# Patient Record
Sex: MALE | Race: BLACK OR AFRICAN AMERICAN | NOT HISPANIC OR LATINO | Employment: OTHER | URBAN - METROPOLITAN AREA
[De-identification: names, ages, dates, MRNs, and addresses within clinical notes are randomized per-mention and may not be internally consistent; named-entity substitution may affect disease eponyms.]

---

## 2021-12-08 ENCOUNTER — OFFICE VISIT (OUTPATIENT)
Dept: OPHTHALMOLOGY | Facility: CLINIC | Age: 51
End: 2021-12-08
Payer: MEDICARE

## 2021-12-08 DIAGNOSIS — H54.3 LOW VISION, BOTH EYES: ICD-10-CM

## 2021-12-08 DIAGNOSIS — E11.3553 STABLE PROLIFERATIVE DIABETIC RETINOPATHY OF BOTH EYES ASSOCIATED WITH TYPE 2 DIABETES MELLITUS: ICD-10-CM

## 2021-12-08 DIAGNOSIS — H35.371 EPIRETINAL MEMBRANE (ERM) OF RIGHT EYE: ICD-10-CM

## 2021-12-08 DIAGNOSIS — Z86.73 HISTORY OF STROKE IN ADULTHOOD: Primary | ICD-10-CM

## 2021-12-08 DIAGNOSIS — M35.01 KERATOCONJUNCTIVITIS SICCA: ICD-10-CM

## 2021-12-08 DIAGNOSIS — Z96.1 PSEUDOPHAKIA OF BOTH EYES: ICD-10-CM

## 2021-12-08 PROBLEM — E87.5 HYPERKALEMIA: Status: ACTIVE | Noted: 2019-07-27

## 2021-12-08 PROBLEM — L97.419 DIABETIC ULCER OF RIGHT MIDFOOT ASSOCIATED WITH TYPE 2 DIABETES MELLITUS: Status: ACTIVE | Noted: 2019-07-27

## 2021-12-08 PROBLEM — D75.839 THROMBOCYTOSIS: Status: ACTIVE | Noted: 2019-07-31

## 2021-12-08 PROBLEM — E11.621 DIABETIC ULCER OF RIGHT MIDFOOT ASSOCIATED WITH TYPE 2 DIABETES MELLITUS: Status: ACTIVE | Noted: 2019-07-27

## 2021-12-08 PROBLEM — Z89.429 HISTORY OF AMPUTATION OF LESSER TOE: Status: ACTIVE | Noted: 2019-07-30

## 2021-12-08 PROBLEM — E11.9 TYPE 2 DIABETES MELLITUS: Status: ACTIVE | Noted: 2019-07-27

## 2021-12-08 PROBLEM — N18.30 CKD (CHRONIC KIDNEY DISEASE) STAGE 3, GFR 30-59 ML/MIN: Status: ACTIVE | Noted: 2019-07-27

## 2021-12-08 PROBLEM — I10 ESSENTIAL HYPERTENSION: Status: ACTIVE | Noted: 2019-07-27

## 2021-12-08 PROBLEM — D64.9 NORMOCYTIC ANEMIA: Status: ACTIVE | Noted: 2019-07-27

## 2021-12-08 PROCEDURE — 92004 PR EYE EXAM, NEW PATIENT,COMPREHESV: ICD-10-PCS | Mod: S$PBB,,, | Performed by: STUDENT IN AN ORGANIZED HEALTH CARE EDUCATION/TRAINING PROGRAM

## 2021-12-08 PROCEDURE — 92134 POSTERIOR SEGMENT OCT RETINA (OCULAR COHERENCE TOMOGRAPHY)-BOTH EYES: ICD-10-PCS | Mod: 26,S$PBB,, | Performed by: STUDENT IN AN ORGANIZED HEALTH CARE EDUCATION/TRAINING PROGRAM

## 2021-12-08 PROCEDURE — 92004 COMPRE OPH EXAM NEW PT 1/>: CPT | Mod: S$PBB,,, | Performed by: STUDENT IN AN ORGANIZED HEALTH CARE EDUCATION/TRAINING PROGRAM

## 2021-12-08 PROCEDURE — 99203 OFFICE O/P NEW LOW 30 MIN: CPT | Mod: PBBFAC | Performed by: STUDENT IN AN ORGANIZED HEALTH CARE EDUCATION/TRAINING PROGRAM

## 2021-12-08 PROCEDURE — 92134 CPTRZ OPH DX IMG PST SGM RTA: CPT | Mod: PBBFAC | Performed by: STUDENT IN AN ORGANIZED HEALTH CARE EDUCATION/TRAINING PROGRAM

## 2021-12-08 PROCEDURE — 99999 PR PBB SHADOW E&M-NEW PATIENT-LVL III: CPT | Mod: PBBFAC,,, | Performed by: STUDENT IN AN ORGANIZED HEALTH CARE EDUCATION/TRAINING PROGRAM

## 2021-12-08 PROCEDURE — 99999 PR PBB SHADOW E&M-NEW PATIENT-LVL III: ICD-10-PCS | Mod: PBBFAC,,, | Performed by: STUDENT IN AN ORGANIZED HEALTH CARE EDUCATION/TRAINING PROGRAM

## 2021-12-08 RX ORDER — DULAGLUTIDE 1.5 MG/.5ML
INJECTION, SOLUTION SUBCUTANEOUS
COMMUNITY
Start: 2021-11-01

## 2021-12-08 RX ORDER — INSULIN DEGLUDEC 200 U/ML
25 INJECTION, SOLUTION SUBCUTANEOUS DAILY
COMMUNITY
Start: 2021-11-03

## 2021-12-08 RX ORDER — GABAPENTIN 300 MG/1
300 CAPSULE ORAL 3 TIMES DAILY
COMMUNITY
Start: 2021-08-25

## 2021-12-08 RX ORDER — TAMSULOSIN HYDROCHLORIDE 0.4 MG/1
1 CAPSULE ORAL DAILY
COMMUNITY
Start: 2021-11-22

## 2021-12-08 RX ORDER — CEPHALEXIN 500 MG/1
500 CAPSULE ORAL 4 TIMES DAILY
Status: ON HOLD | COMMUNITY
Start: 2021-10-07 | End: 2024-01-01

## 2021-12-08 RX ORDER — NALOXEGOL OXALATE 25 MG/1
TABLET, FILM COATED ORAL
COMMUNITY
Start: 2021-11-22

## 2021-12-08 RX ORDER — PROMETHAZINE HYDROCHLORIDE 12.5 MG/1
12.5 TABLET ORAL 3 TIMES DAILY PRN
COMMUNITY
Start: 2021-10-06

## 2021-12-08 RX ORDER — MORPHINE SULFATE 30 MG/1
TABLET ORAL
Status: ON HOLD | COMMUNITY
Start: 2021-11-30 | End: 2024-01-01 | Stop reason: CLARIF

## 2021-12-08 RX ORDER — DULOXETIN HYDROCHLORIDE 60 MG/1
60 CAPSULE, DELAYED RELEASE ORAL DAILY
Status: ON HOLD | COMMUNITY
Start: 2021-11-22 | End: 2024-01-01 | Stop reason: CLARIF

## 2021-12-08 RX ORDER — TRAZODONE HYDROCHLORIDE 150 MG/1
150-300 TABLET ORAL NIGHTLY PRN
COMMUNITY
Start: 2021-12-07

## 2021-12-08 RX ORDER — POTASSIUM CHLORIDE 750 MG/1
10 TABLET, EXTENDED RELEASE ORAL DAILY
COMMUNITY
Start: 2021-11-22

## 2021-12-08 RX ORDER — CLOPIDOGREL BISULFATE 75 MG/1
75 TABLET ORAL DAILY
COMMUNITY
Start: 2021-11-22

## 2021-12-08 RX ORDER — ASPIRIN 325 MG
50000 TABLET, DELAYED RELEASE (ENTERIC COATED) ORAL
COMMUNITY
Start: 2021-11-08

## 2021-12-08 RX ORDER — HYDRALAZINE HYDROCHLORIDE 50 MG/1
100 TABLET, FILM COATED ORAL 3 TIMES DAILY
COMMUNITY
Start: 2021-12-07

## 2021-12-08 RX ORDER — ATORVASTATIN CALCIUM 40 MG/1
40 TABLET, FILM COATED ORAL NIGHTLY
COMMUNITY
Start: 2021-11-22

## 2021-12-08 RX ORDER — GLIPIZIDE 5 MG/1
5 TABLET ORAL
COMMUNITY

## 2021-12-08 RX ORDER — CARVEDILOL 25 MG/1
25 TABLET ORAL 2 TIMES DAILY
COMMUNITY
Start: 2021-11-22

## 2021-12-08 RX ORDER — AMLODIPINE BESYLATE 10 MG/1
10 TABLET ORAL DAILY
COMMUNITY
Start: 2021-11-30

## 2021-12-08 RX ORDER — FOLIC ACID 1 MG/1
1000 TABLET ORAL DAILY
COMMUNITY
Start: 2021-10-25

## 2021-12-08 RX ORDER — FUROSEMIDE 80 MG/1
80 TABLET ORAL 2 TIMES DAILY
COMMUNITY
Start: 2021-11-22

## 2021-12-08 RX ORDER — PEN NEEDLE, DIABETIC 29 G X1/2"
NEEDLE, DISPOSABLE MISCELLANEOUS
COMMUNITY
Start: 2021-11-08

## 2022-02-15 ENCOUNTER — TELEPHONE (OUTPATIENT)
Dept: OPHTHALMOLOGY | Facility: CLINIC | Age: 52
End: 2022-02-15
Payer: MEDICARE

## 2022-02-15 NOTE — TELEPHONE ENCOUNTER
----- Message from Ernesto Boyd sent at 2/15/2022 11:30 AM CST -----  Contact: self  Moody Silverio would like a call back at [phone], in regards to why his appointment was scheduled. He states that he was told nothing can be done for his eye sight.

## 2024-01-01 ENCOUNTER — HOSPITAL ENCOUNTER (INPATIENT)
Facility: HOSPITAL | Age: 54
LOS: 10 days | DRG: 291 | End: 2024-01-20
Attending: HOSPITALIST | Admitting: HOSPITALIST
Payer: COMMERCIAL

## 2024-01-01 VITALS
BODY MASS INDEX: 34.83 KG/M2 | OXYGEN SATURATION: 94 % | HEART RATE: 93 BPM | HEIGHT: 73 IN | RESPIRATION RATE: 28 BRPM | WEIGHT: 262.81 LBS | TEMPERATURE: 102 F | DIASTOLIC BLOOD PRESSURE: 84 MMHG | SYSTOLIC BLOOD PRESSURE: 190 MMHG

## 2024-01-01 DIAGNOSIS — N18.9 ANEMIA IN CHRONIC KIDNEY DISEASE, UNSPECIFIED CKD STAGE: Primary | ICD-10-CM

## 2024-01-01 DIAGNOSIS — N39.0 URINARY TRACT INFECTION WITHOUT HEMATURIA, SITE UNSPECIFIED: ICD-10-CM

## 2024-01-01 DIAGNOSIS — N17.9 ACUTE KIDNEY INJURY SUPERIMPOSED ON CKD: ICD-10-CM

## 2024-01-01 DIAGNOSIS — N18.6 ESRD (END STAGE RENAL DISEASE): ICD-10-CM

## 2024-01-01 DIAGNOSIS — N17.9 ACUTE ON CHRONIC RENAL FAILURE: ICD-10-CM

## 2024-01-01 DIAGNOSIS — D63.1 ANEMIA IN CHRONIC KIDNEY DISEASE, UNSPECIFIED CKD STAGE: Primary | ICD-10-CM

## 2024-01-01 DIAGNOSIS — I50.31 ACUTE DIASTOLIC CONGESTIVE HEART FAILURE: ICD-10-CM

## 2024-01-01 DIAGNOSIS — J81.1 PULMONARY EDEMA: ICD-10-CM

## 2024-01-01 DIAGNOSIS — N18.9 ACUTE KIDNEY INJURY SUPERIMPOSED ON CKD: ICD-10-CM

## 2024-01-01 DIAGNOSIS — N18.9 ACUTE ON CHRONIC RENAL FAILURE: ICD-10-CM

## 2024-01-01 DIAGNOSIS — R07.9 CHEST PAIN: ICD-10-CM

## 2024-01-01 LAB
ABO + RH BLD: NORMAL
ABO + RH BLD: NORMAL
ADENOVIRUS: NOT DETECTED
ALBUMIN SERPL BCP-MCNC: 1.7 G/DL (ref 3.5–5.2)
ALBUMIN SERPL BCP-MCNC: 1.8 G/DL (ref 3.5–5.2)
ALBUMIN SERPL BCP-MCNC: 1.8 G/DL (ref 3.5–5.2)
ALBUMIN SERPL BCP-MCNC: 1.9 G/DL (ref 3.5–5.2)
ALBUMIN SERPL BCP-MCNC: 2.1 G/DL (ref 3.5–5.2)
ALBUMIN SERPL BCP-MCNC: 2.1 G/DL (ref 3.5–5.2)
ALBUMIN SERPL BCP-MCNC: 2.3 G/DL (ref 3.5–5.2)
ALBUMIN SERPL BCP-MCNC: 2.5 G/DL (ref 3.5–5.2)
ALLENS TEST: ABNORMAL
ALLENS TEST: ABNORMAL
ALP SERPL-CCNC: 66 U/L (ref 55–135)
ALT SERPL W/O P-5'-P-CCNC: 14 U/L (ref 10–44)
ANION GAP SERPL CALC-SCNC: 10 MMOL/L (ref 8–16)
ANION GAP SERPL CALC-SCNC: 10 MMOL/L (ref 8–16)
ANION GAP SERPL CALC-SCNC: 14 MMOL/L (ref 8–16)
ANION GAP SERPL CALC-SCNC: 14 MMOL/L (ref 8–16)
ANION GAP SERPL CALC-SCNC: 16 MMOL/L (ref 8–16)
ANION GAP SERPL CALC-SCNC: 17 MMOL/L (ref 8–16)
ANION GAP SERPL CALC-SCNC: 17 MMOL/L (ref 8–16)
ANION GAP SERPL CALC-SCNC: 21 MMOL/L (ref 8–16)
AORTIC ROOT ANNULUS: 3.69 CM
ASCENDING AORTA: 3.15 CM
AST SERPL-CCNC: 13 U/L (ref 10–40)
AV INDEX (PROSTH): 0.78
AV MEAN GRADIENT: 3 MMHG
AV PEAK GRADIENT: 4 MMHG
AV VALVE AREA BY VELOCITY RATIO: 3.77 CM²
AV VALVE AREA: 3.43 CM²
AV VELOCITY RATIO: 0.86
BACTERIA #/AREA URNS HPF: ABNORMAL /HPF
BACTERIA BLD CULT: NORMAL
BACTERIA SPEC AEROBE CULT: NORMAL
BACTERIA UR CULT: NO GROWTH
BASOPHILS # BLD AUTO: 0.01 K/UL (ref 0–0.2)
BASOPHILS # BLD AUTO: 0.02 K/UL (ref 0–0.2)
BASOPHILS # BLD AUTO: 0.03 K/UL (ref 0–0.2)
BASOPHILS # BLD AUTO: 0.04 K/UL (ref 0–0.2)
BASOPHILS # BLD AUTO: 0.04 K/UL (ref 0–0.2)
BASOPHILS NFR BLD: 0.1 % (ref 0–1.9)
BASOPHILS NFR BLD: 0.2 % (ref 0–1.9)
BASOPHILS NFR BLD: 0.3 % (ref 0–1.9)
BASOPHILS NFR BLD: 0.3 % (ref 0–1.9)
BILIRUB SERPL-MCNC: 0.5 MG/DL (ref 0.1–1)
BILIRUB UR QL STRIP: NEGATIVE
BLD GP AB SCN CELLS X3 SERPL QL: NORMAL
BLD GP AB SCN CELLS X3 SERPL QL: NORMAL
BLD PROD TYP BPU: NORMAL
BLD PROD TYP BPU: NORMAL
BLOOD UNIT EXPIRATION DATE: NORMAL
BLOOD UNIT EXPIRATION DATE: NORMAL
BLOOD UNIT TYPE CODE: 7300
BLOOD UNIT TYPE CODE: 7300
BLOOD UNIT TYPE: NORMAL
BLOOD UNIT TYPE: NORMAL
BNP SERPL-MCNC: 166 PG/ML (ref 0–99)
BNP SERPL-MCNC: 240 PG/ML (ref 0–99)
BORDETELLA PARAPERTUSSIS (IS1001): NOT DETECTED
BORDETELLA PERTUSSIS (PTXP): NOT DETECTED
BSA FOR ECHO PROCEDURE: 2.55 M2
BUN SERPL-MCNC: 107 MG/DL (ref 6–20)
BUN SERPL-MCNC: 110 MG/DL (ref 6–20)
BUN SERPL-MCNC: 114 MG/DL (ref 6–20)
BUN SERPL-MCNC: 131 MG/DL (ref 6–20)
BUN SERPL-MCNC: 153 MG/DL (ref 6–20)
BUN SERPL-MCNC: 80 MG/DL (ref 6–20)
BUN SERPL-MCNC: 89 MG/DL (ref 6–20)
BUN SERPL-MCNC: 92 MG/DL (ref 6–20)
BUN SERPL-MCNC: 96 MG/DL (ref 6–20)
BUN SERPL-MCNC: 97 MG/DL (ref 6–20)
BUN SERPL-MCNC: 97 MG/DL (ref 6–20)
CALCIUM SERPL-MCNC: 6.7 MG/DL (ref 8.7–10.5)
CALCIUM SERPL-MCNC: 6.9 MG/DL (ref 8.7–10.5)
CALCIUM SERPL-MCNC: 7.1 MG/DL (ref 8.7–10.5)
CALCIUM SERPL-MCNC: 7.1 MG/DL (ref 8.7–10.5)
CALCIUM SERPL-MCNC: 7.2 MG/DL (ref 8.7–10.5)
CALCIUM SERPL-MCNC: 7.3 MG/DL (ref 8.7–10.5)
CALCIUM SERPL-MCNC: 7.6 MG/DL (ref 8.7–10.5)
CALCIUM SERPL-MCNC: 7.6 MG/DL (ref 8.7–10.5)
CALCIUM SERPL-MCNC: 7.7 MG/DL (ref 8.7–10.5)
CALCIUM SERPL-MCNC: 7.8 MG/DL (ref 8.7–10.5)
CALCIUM SERPL-MCNC: 8 MG/DL (ref 8.7–10.5)
CHLAMYDIA PNEUMONIAE: NOT DETECTED
CHLORIDE SERPL-SCNC: 100 MMOL/L (ref 95–110)
CHLORIDE SERPL-SCNC: 104 MMOL/L (ref 95–110)
CHLORIDE SERPL-SCNC: 105 MMOL/L (ref 95–110)
CHLORIDE SERPL-SCNC: 106 MMOL/L (ref 95–110)
CHLORIDE SERPL-SCNC: 95 MMOL/L (ref 95–110)
CHLORIDE SERPL-SCNC: 96 MMOL/L (ref 95–110)
CHLORIDE SERPL-SCNC: 98 MMOL/L (ref 95–110)
CLARITY UR: ABNORMAL
CO2 SERPL-SCNC: 14 MMOL/L (ref 23–29)
CO2 SERPL-SCNC: 14 MMOL/L (ref 23–29)
CO2 SERPL-SCNC: 15 MMOL/L (ref 23–29)
CO2 SERPL-SCNC: 15 MMOL/L (ref 23–29)
CO2 SERPL-SCNC: 16 MMOL/L (ref 23–29)
CO2 SERPL-SCNC: 17 MMOL/L (ref 23–29)
CO2 SERPL-SCNC: 17 MMOL/L (ref 23–29)
CO2 SERPL-SCNC: 18 MMOL/L (ref 23–29)
CO2 SERPL-SCNC: 21 MMOL/L (ref 23–29)
CO2 SERPL-SCNC: 22 MMOL/L (ref 23–29)
CO2 SERPL-SCNC: 28 MMOL/L (ref 23–29)
CODING SYSTEM: NORMAL
CODING SYSTEM: NORMAL
COLOR UR: YELLOW
CORONAVIRUS 229E, COMMON COLD VIRUS: NOT DETECTED
CORONAVIRUS HKU1, COMMON COLD VIRUS: NOT DETECTED
CORONAVIRUS NL63, COMMON COLD VIRUS: NOT DETECTED
CORONAVIRUS OC43, COMMON COLD VIRUS: NOT DETECTED
CREAT SERPL-MCNC: 10.6 MG/DL (ref 0.5–1.4)
CREAT SERPL-MCNC: 4.7 MG/DL (ref 0.5–1.4)
CREAT SERPL-MCNC: 4.8 MG/DL (ref 0.5–1.4)
CREAT SERPL-MCNC: 5.6 MG/DL (ref 0.5–1.4)
CREAT SERPL-MCNC: 6 MG/DL (ref 0.5–1.4)
CREAT SERPL-MCNC: 6.7 MG/DL (ref 0.5–1.4)
CREAT SERPL-MCNC: 8 MG/DL (ref 0.5–1.4)
CREAT SERPL-MCNC: 8.6 MG/DL (ref 0.5–1.4)
CROSSMATCH INTERPRETATION: NORMAL
CROSSMATCH INTERPRETATION: NORMAL
CV ECHO LV RWT: 0.44 CM
DELSYS: ABNORMAL
DELSYS: ABNORMAL
DIFFERENTIAL METHOD BLD: ABNORMAL
DISPENSE STATUS: NORMAL
DISPENSE STATUS: NORMAL
DOP CALC AO PEAK VEL: 1.02 M/S
DOP CALC AO VTI: 25.4 CM
DOP CALC LVOT AREA: 4.4 CM2
DOP CALC LVOT DIAMETER: 2.36 CM
DOP CALC LVOT PEAK VEL: 0.88 M/S
DOP CALC LVOT STROKE VOLUME: 87.01 CM3
DOP CALC RVOT PEAK VEL: 0.73 M/S
DOP CALC RVOT VTI: 15.5 CM
DOP CALCLVOT PEAK VEL VTI: 19.9 CM
E WAVE DECELERATION TIME: 175.34 MSEC
E/A RATIO: 1.26
E/E' RATIO: 13 M/S
ECHO LV POSTERIOR WALL: 1.09 CM (ref 0.6–1.1)
EOSINOPHIL # BLD AUTO: 0 K/UL (ref 0–0.5)
EOSINOPHIL # BLD AUTO: 0 K/UL (ref 0–0.5)
EOSINOPHIL # BLD AUTO: 0.1 K/UL (ref 0–0.5)
EOSINOPHIL # BLD AUTO: 0.1 K/UL (ref 0–0.5)
EOSINOPHIL # BLD AUTO: 0.2 K/UL (ref 0–0.5)
EOSINOPHIL # BLD AUTO: 0.3 K/UL (ref 0–0.5)
EOSINOPHIL # BLD AUTO: 0.4 K/UL (ref 0–0.5)
EOSINOPHIL NFR BLD: 0 % (ref 0–8)
EOSINOPHIL NFR BLD: 0 % (ref 0–8)
EOSINOPHIL NFR BLD: 0.4 % (ref 0–8)
EOSINOPHIL NFR BLD: 0.5 % (ref 0–8)
EOSINOPHIL NFR BLD: 1.4 % (ref 0–8)
EOSINOPHIL NFR BLD: 1.7 % (ref 0–8)
EOSINOPHIL NFR BLD: 1.8 % (ref 0–8)
EOSINOPHIL NFR BLD: 1.9 % (ref 0–8)
EOSINOPHIL NFR BLD: 2 % (ref 0–8)
EOSINOPHIL NFR BLD: 2.6 % (ref 0–8)
EOSINOPHIL NFR BLD: 3.1 % (ref 0–8)
EP: 6
ERYTHROCYTE [DISTWIDTH] IN BLOOD BY AUTOMATED COUNT: 13.8 % (ref 11.5–14.5)
ERYTHROCYTE [DISTWIDTH] IN BLOOD BY AUTOMATED COUNT: 13.9 % (ref 11.5–14.5)
ERYTHROCYTE [DISTWIDTH] IN BLOOD BY AUTOMATED COUNT: 14 % (ref 11.5–14.5)
ERYTHROCYTE [DISTWIDTH] IN BLOOD BY AUTOMATED COUNT: 14.1 % (ref 11.5–14.5)
ERYTHROCYTE [DISTWIDTH] IN BLOOD BY AUTOMATED COUNT: 14.1 % (ref 11.5–14.5)
ERYTHROCYTE [DISTWIDTH] IN BLOOD BY AUTOMATED COUNT: 14.2 % (ref 11.5–14.5)
ERYTHROCYTE [DISTWIDTH] IN BLOOD BY AUTOMATED COUNT: 14.2 % (ref 11.5–14.5)
ERYTHROCYTE [DISTWIDTH] IN BLOOD BY AUTOMATED COUNT: 14.4 % (ref 11.5–14.5)
ERYTHROCYTE [DISTWIDTH] IN BLOOD BY AUTOMATED COUNT: 14.6 % (ref 11.5–14.5)
ERYTHROCYTE [SEDIMENTATION RATE] IN BLOOD BY WESTERGREN METHOD: 18 MM/H
EST. GFR  (NO RACE VARIABLE): 10 ML/MIN/1.73 M^2
EST. GFR  (NO RACE VARIABLE): 11 ML/MIN/1.73 M^2
EST. GFR  (NO RACE VARIABLE): 14 ML/MIN/1.73 M^2
EST. GFR  (NO RACE VARIABLE): 5 ML/MIN/1.73 M^2
EST. GFR  (NO RACE VARIABLE): 7 ML/MIN/1.73 M^2
EST. GFR  (NO RACE VARIABLE): 7 ML/MIN/1.73 M^2
EST. GFR  (NO RACE VARIABLE): 9 ML/MIN/1.73 M^2
ESTIMATED AVG GLUCOSE: 131 MG/DL (ref 68–131)
FERRITIN SERPL-MCNC: 256 NG/ML (ref 20–300)
FIO2: 100
FIO2: 60
FLOW: 30
FLUBV RNA NPH QL NAA+NON-PROBE: NOT DETECTED
FRACTIONAL SHORTENING: 33 % (ref 28–44)
GLUCOSE SERPL-MCNC: 131 MG/DL (ref 70–110)
GLUCOSE SERPL-MCNC: 141 MG/DL (ref 70–110)
GLUCOSE SERPL-MCNC: 155 MG/DL (ref 70–110)
GLUCOSE SERPL-MCNC: 158 MG/DL (ref 70–110)
GLUCOSE SERPL-MCNC: 159 MG/DL (ref 70–110)
GLUCOSE SERPL-MCNC: 162 MG/DL (ref 70–110)
GLUCOSE SERPL-MCNC: 163 MG/DL (ref 70–110)
GLUCOSE SERPL-MCNC: 189 MG/DL (ref 70–110)
GLUCOSE SERPL-MCNC: 213 MG/DL (ref 70–110)
GLUCOSE SERPL-MCNC: 222 MG/DL (ref 70–110)
GLUCOSE SERPL-MCNC: 389 MG/DL (ref 70–110)
GLUCOSE UR QL STRIP: ABNORMAL
GRAM STN SPEC: NORMAL
GRAM STN SPEC: NORMAL
HAV IGM SERPL QL IA: NORMAL
HBA1C MFR BLD: 6.2 % (ref 4–5.6)
HBV CORE AB SERPL QL IA: NORMAL
HBV CORE IGM SERPL QL IA: NORMAL
HBV SURFACE AB SER-ACNC: 5.05 MIU/ML
HBV SURFACE AB SER-ACNC: NORMAL M[IU]/ML
HBV SURFACE AG SERPL QL IA: NORMAL
HBV SURFACE AG SERPL QL IA: NORMAL
HCO3 UR-SCNC: 20 MMOL/L (ref 24–28)
HCO3 UR-SCNC: 22.7 MMOL/L (ref 24–28)
HCT VFR BLD AUTO: 19.7 % (ref 40–54)
HCT VFR BLD AUTO: 21.5 % (ref 40–54)
HCT VFR BLD AUTO: 22 % (ref 40–54)
HCT VFR BLD AUTO: 23.3 % (ref 40–54)
HCT VFR BLD AUTO: 23.4 % (ref 40–54)
HCT VFR BLD AUTO: 23.7 % (ref 40–54)
HCT VFR BLD AUTO: 24.2 % (ref 40–54)
HCT VFR BLD AUTO: 24.5 % (ref 40–54)
HCT VFR BLD AUTO: 24.6 % (ref 40–54)
HCT VFR BLD AUTO: 25.4 % (ref 40–54)
HCT VFR BLD AUTO: 27.2 % (ref 40–54)
HCV AB SERPL QL IA: NORMAL
HGB BLD-MCNC: 6.5 G/DL (ref 14–18)
HGB BLD-MCNC: 7.2 G/DL (ref 14–18)
HGB BLD-MCNC: 7.3 G/DL (ref 14–18)
HGB BLD-MCNC: 7.4 G/DL (ref 14–18)
HGB BLD-MCNC: 7.5 G/DL (ref 14–18)
HGB BLD-MCNC: 7.6 G/DL (ref 14–18)
HGB BLD-MCNC: 7.7 G/DL (ref 14–18)
HGB BLD-MCNC: 8.1 G/DL (ref 14–18)
HGB BLD-MCNC: 8.2 G/DL (ref 14–18)
HGB BLD-MCNC: 8.2 G/DL (ref 14–18)
HGB BLD-MCNC: 8.8 G/DL (ref 14–18)
HGB UR QL STRIP: ABNORMAL
HPIV1 RNA NPH QL NAA+NON-PROBE: NOT DETECTED
HPIV2 RNA NPH QL NAA+NON-PROBE: NOT DETECTED
HPIV3 RNA NPH QL NAA+NON-PROBE: NOT DETECTED
HPIV4 RNA NPH QL NAA+NON-PROBE: NOT DETECTED
HUMAN METAPNEUMOVIRUS: NOT DETECTED
HYALINE CASTS #/AREA URNS LPF: 57 /LPF
IMM GRANULOCYTES # BLD AUTO: 0.05 K/UL (ref 0–0.04)
IMM GRANULOCYTES # BLD AUTO: 0.07 K/UL (ref 0–0.04)
IMM GRANULOCYTES # BLD AUTO: 0.09 K/UL (ref 0–0.04)
IMM GRANULOCYTES # BLD AUTO: 0.11 K/UL (ref 0–0.04)
IMM GRANULOCYTES # BLD AUTO: 0.15 K/UL (ref 0–0.04)
IMM GRANULOCYTES # BLD AUTO: 0.15 K/UL (ref 0–0.04)
IMM GRANULOCYTES # BLD AUTO: 0.16 K/UL (ref 0–0.04)
IMM GRANULOCYTES # BLD AUTO: 0.21 K/UL (ref 0–0.04)
IMM GRANULOCYTES # BLD AUTO: 0.21 K/UL (ref 0–0.04)
IMM GRANULOCYTES # BLD AUTO: 0.22 K/UL (ref 0–0.04)
IMM GRANULOCYTES # BLD AUTO: 0.29 K/UL (ref 0–0.04)
IMM GRANULOCYTES NFR BLD AUTO: 0.6 % (ref 0–0.5)
IMM GRANULOCYTES NFR BLD AUTO: 0.7 % (ref 0–0.5)
IMM GRANULOCYTES NFR BLD AUTO: 0.9 % (ref 0–0.5)
IMM GRANULOCYTES NFR BLD AUTO: 1 % (ref 0–0.5)
IMM GRANULOCYTES NFR BLD AUTO: 1.1 % (ref 0–0.5)
IMM GRANULOCYTES NFR BLD AUTO: 1.1 % (ref 0–0.5)
IMM GRANULOCYTES NFR BLD AUTO: 1.3 % (ref 0–0.5)
IMM GRANULOCYTES NFR BLD AUTO: 1.4 % (ref 0–0.5)
IMM GRANULOCYTES NFR BLD AUTO: 1.5 % (ref 0–0.5)
IMM GRANULOCYTES NFR BLD AUTO: 1.5 % (ref 0–0.5)
IMM GRANULOCYTES NFR BLD AUTO: 1.6 % (ref 0–0.5)
INFLUENZA A (SUBTYPES H1,H1-2009,H3): NOT DETECTED
INTERVENTRICULAR SEPTUM: 1.24 CM (ref 0.6–1.1)
IP: 12
IRON SERPL-MCNC: 13 UG/DL (ref 45–160)
IVRT: 91.34 MSEC
KETONES UR QL STRIP: NEGATIVE
LA MAJOR: 4.98 CM
LA MINOR: 5.04 CM
LA WIDTH: 3.9 CM
LACTATE SERPL-SCNC: 0.6 MMOL/L (ref 0.5–2.2)
LACTATE SERPL-SCNC: 0.6 MMOL/L (ref 0.5–2.2)
LEFT ATRIUM SIZE: 3.1 CM
LEFT ATRIUM VOLUME INDEX: 20.8 ML/M2
LEFT ATRIUM VOLUME: 51.48 CM3
LEFT INTERNAL DIMENSION IN SYSTOLE: 3.32 CM (ref 2.1–4)
LEFT VENTRICLE DIASTOLIC VOLUME INDEX: 47 ML/M2
LEFT VENTRICLE DIASTOLIC VOLUME: 116.57 ML
LEFT VENTRICLE MASS INDEX: 90 G/M2
LEFT VENTRICLE SYSTOLIC VOLUME INDEX: 18.1 ML/M2
LEFT VENTRICLE SYSTOLIC VOLUME: 44.85 ML
LEFT VENTRICULAR INTERNAL DIMENSION IN DIASTOLE: 4.97 CM (ref 3.5–6)
LEFT VENTRICULAR MASS: 222.12 G
LEUKOCYTE ESTERASE UR QL STRIP: ABNORMAL
LV LATERAL E/E' RATIO: 13 M/S
LV SEPTAL E/E' RATIO: 13 M/S
LVOT MG: 1.95 MMHG
LVOT MV: 0.67 CM/S
LYMPHOCYTES # BLD AUTO: 0.5 K/UL (ref 1–4.8)
LYMPHOCYTES # BLD AUTO: 0.7 K/UL (ref 1–4.8)
LYMPHOCYTES # BLD AUTO: 0.7 K/UL (ref 1–4.8)
LYMPHOCYTES # BLD AUTO: 0.8 K/UL (ref 1–4.8)
LYMPHOCYTES # BLD AUTO: 0.8 K/UL (ref 1–4.8)
LYMPHOCYTES # BLD AUTO: 0.9 K/UL (ref 1–4.8)
LYMPHOCYTES # BLD AUTO: 1 K/UL (ref 1–4.8)
LYMPHOCYTES NFR BLD: 10 % (ref 18–48)
LYMPHOCYTES NFR BLD: 11.7 % (ref 18–48)
LYMPHOCYTES NFR BLD: 2.5 % (ref 18–48)
LYMPHOCYTES NFR BLD: 4.7 % (ref 18–48)
LYMPHOCYTES NFR BLD: 5 % (ref 18–48)
LYMPHOCYTES NFR BLD: 5.8 % (ref 18–48)
LYMPHOCYTES NFR BLD: 7.4 % (ref 18–48)
LYMPHOCYTES NFR BLD: 7.5 % (ref 18–48)
LYMPHOCYTES NFR BLD: 7.9 % (ref 18–48)
LYMPHOCYTES NFR BLD: 8.1 % (ref 18–48)
LYMPHOCYTES NFR BLD: 8.1 % (ref 18–48)
MAGNESIUM SERPL-MCNC: 2.5 MG/DL (ref 1.6–2.6)
MCH RBC QN AUTO: 27.8 PG (ref 27–31)
MCH RBC QN AUTO: 28 PG (ref 27–31)
MCH RBC QN AUTO: 28.3 PG (ref 27–31)
MCH RBC QN AUTO: 28.4 PG (ref 27–31)
MCH RBC QN AUTO: 28.4 PG (ref 27–31)
MCH RBC QN AUTO: 28.8 PG (ref 27–31)
MCH RBC QN AUTO: 28.8 PG (ref 27–31)
MCH RBC QN AUTO: 28.9 PG (ref 27–31)
MCH RBC QN AUTO: 28.9 PG (ref 27–31)
MCH RBC QN AUTO: 29.2 PG (ref 27–31)
MCH RBC QN AUTO: 29.4 PG (ref 27–31)
MCHC RBC AUTO-ENTMCNC: 31.4 G/DL (ref 32–36)
MCHC RBC AUTO-ENTMCNC: 31.6 G/DL (ref 32–36)
MCHC RBC AUTO-ENTMCNC: 32.1 G/DL (ref 32–36)
MCHC RBC AUTO-ENTMCNC: 32.2 G/DL (ref 32–36)
MCHC RBC AUTO-ENTMCNC: 32.3 G/DL (ref 32–36)
MCHC RBC AUTO-ENTMCNC: 32.4 G/DL (ref 32–36)
MCHC RBC AUTO-ENTMCNC: 33 G/DL (ref 32–36)
MCHC RBC AUTO-ENTMCNC: 33.2 G/DL (ref 32–36)
MCHC RBC AUTO-ENTMCNC: 33.3 G/DL (ref 32–36)
MCHC RBC AUTO-ENTMCNC: 33.5 G/DL (ref 32–36)
MCHC RBC AUTO-ENTMCNC: 33.5 G/DL (ref 32–36)
MCV RBC AUTO: 86 FL (ref 82–98)
MCV RBC AUTO: 87 FL (ref 82–98)
MCV RBC AUTO: 87 FL (ref 82–98)
MCV RBC AUTO: 88 FL (ref 82–98)
MCV RBC AUTO: 89 FL (ref 82–98)
MCV RBC AUTO: 90 FL (ref 82–98)
MICROSCOPIC COMMENT: ABNORMAL
MODE: ABNORMAL
MODE: ABNORMAL
MONOCYTES # BLD AUTO: 0.7 K/UL (ref 0.3–1)
MONOCYTES # BLD AUTO: 0.8 K/UL (ref 0.3–1)
MONOCYTES # BLD AUTO: 0.9 K/UL (ref 0.3–1)
MONOCYTES # BLD AUTO: 1 K/UL (ref 0.3–1)
MONOCYTES # BLD AUTO: 1.1 K/UL (ref 0.3–1)
MONOCYTES # BLD AUTO: 1.1 K/UL (ref 0.3–1)
MONOCYTES NFR BLD: 4.7 % (ref 4–15)
MONOCYTES NFR BLD: 5.3 % (ref 4–15)
MONOCYTES NFR BLD: 6.9 % (ref 4–15)
MONOCYTES NFR BLD: 6.9 % (ref 4–15)
MONOCYTES NFR BLD: 7.2 % (ref 4–15)
MONOCYTES NFR BLD: 7.4 % (ref 4–15)
MONOCYTES NFR BLD: 7.6 % (ref 4–15)
MONOCYTES NFR BLD: 7.8 % (ref 4–15)
MONOCYTES NFR BLD: 8 % (ref 4–15)
MONOCYTES NFR BLD: 8.3 % (ref 4–15)
MONOCYTES NFR BLD: 9.5 % (ref 4–15)
MV PEAK A VEL: 0.93 M/S
MV PEAK E VEL: 1.17 M/S
MYCOPLASMA PNEUMONIAE: NOT DETECTED
NEUTROPHILS # BLD AUTO: 10.2 K/UL (ref 1.8–7.7)
NEUTROPHILS # BLD AUTO: 10.5 K/UL (ref 1.8–7.7)
NEUTROPHILS # BLD AUTO: 10.6 K/UL (ref 1.8–7.7)
NEUTROPHILS # BLD AUTO: 12 K/UL (ref 1.8–7.7)
NEUTROPHILS # BLD AUTO: 12.1 K/UL (ref 1.8–7.7)
NEUTROPHILS # BLD AUTO: 13.5 K/UL (ref 1.8–7.7)
NEUTROPHILS # BLD AUTO: 17.1 K/UL (ref 1.8–7.7)
NEUTROPHILS # BLD AUTO: 6.6 K/UL (ref 1.8–7.7)
NEUTROPHILS # BLD AUTO: 6.7 K/UL (ref 1.8–7.7)
NEUTROPHILS # BLD AUTO: 7.9 K/UL (ref 1.8–7.7)
NEUTROPHILS # BLD AUTO: 9.4 K/UL (ref 1.8–7.7)
NEUTROPHILS NFR BLD: 75.9 % (ref 38–73)
NEUTROPHILS NFR BLD: 80.8 % (ref 38–73)
NEUTROPHILS NFR BLD: 81.2 % (ref 38–73)
NEUTROPHILS NFR BLD: 81.2 % (ref 38–73)
NEUTROPHILS NFR BLD: 81.4 % (ref 38–73)
NEUTROPHILS NFR BLD: 82.1 % (ref 38–73)
NEUTROPHILS NFR BLD: 82.7 % (ref 38–73)
NEUTROPHILS NFR BLD: 82.9 % (ref 38–73)
NEUTROPHILS NFR BLD: 85.1 % (ref 38–73)
NEUTROPHILS NFR BLD: 87.8 % (ref 38–73)
NEUTROPHILS NFR BLD: 90.6 % (ref 38–73)
NITRITE UR QL STRIP: NEGATIVE
NRBC BLD-RTO: 0 /100 WBC
NUM UNITS TRANS PACKED RBC: NORMAL
NUM UNITS TRANS PACKED RBC: NORMAL
PCO2 BLDA: 33.6 MMHG (ref 35–45)
PCO2 BLDA: 41.5 MMHG (ref 35–45)
PH SMN: 7.35 [PH] (ref 7.35–7.45)
PH SMN: 7.38 [PH] (ref 7.35–7.45)
PH UR STRIP: 5 [PH] (ref 5–8)
PHOSPHATE SERPL-MCNC: 4.6 MG/DL (ref 2.7–4.5)
PHOSPHATE SERPL-MCNC: 4.7 MG/DL (ref 2.7–4.5)
PHOSPHATE SERPL-MCNC: 5 MG/DL (ref 2.7–4.5)
PHOSPHATE SERPL-MCNC: 5.4 MG/DL (ref 2.7–4.5)
PHOSPHATE SERPL-MCNC: 6 MG/DL (ref 2.7–4.5)
PHOSPHATE SERPL-MCNC: 6.1 MG/DL (ref 2.7–4.5)
PHOSPHATE SERPL-MCNC: 6.7 MG/DL (ref 2.7–4.5)
PHOSPHATE SERPL-MCNC: 6.8 MG/DL (ref 2.7–4.5)
PHOSPHATE SERPL-MCNC: 8.4 MG/DL (ref 2.7–4.5)
PISA TR MAX VEL: 2.76 M/S
PLATELET # BLD AUTO: 339 K/UL (ref 150–450)
PLATELET # BLD AUTO: 343 K/UL (ref 150–450)
PLATELET # BLD AUTO: 350 K/UL (ref 150–450)
PLATELET # BLD AUTO: 356 K/UL (ref 150–450)
PLATELET # BLD AUTO: 358 K/UL (ref 150–450)
PLATELET # BLD AUTO: 360 K/UL (ref 150–450)
PLATELET # BLD AUTO: 360 K/UL (ref 150–450)
PLATELET # BLD AUTO: 363 K/UL (ref 150–450)
PLATELET # BLD AUTO: 364 K/UL (ref 150–450)
PLATELET # BLD AUTO: 375 K/UL (ref 150–450)
PLATELET # BLD AUTO: 381 K/UL (ref 150–450)
PMV BLD AUTO: 10 FL (ref 9.2–12.9)
PMV BLD AUTO: 9.6 FL (ref 9.2–12.9)
PMV BLD AUTO: 9.7 FL (ref 9.2–12.9)
PMV BLD AUTO: 9.8 FL (ref 9.2–12.9)
PMV BLD AUTO: 9.8 FL (ref 9.2–12.9)
PMV BLD AUTO: 9.9 FL (ref 9.2–12.9)
PO2 BLDA: 154 MMHG (ref 80–100)
PO2 BLDA: 60 MMHG (ref 80–100)
POC BE: -3 MMOL/L
POC BE: -5 MMOL/L
POC SATURATED O2: 91 % (ref 95–100)
POC SATURATED O2: 99 % (ref 95–100)
POCT GLUCOSE: 111 MG/DL (ref 70–110)
POCT GLUCOSE: 135 MG/DL (ref 70–110)
POCT GLUCOSE: 140 MG/DL (ref 70–110)
POCT GLUCOSE: 141 MG/DL (ref 70–110)
POCT GLUCOSE: 144 MG/DL (ref 70–110)
POCT GLUCOSE: 146 MG/DL (ref 70–110)
POCT GLUCOSE: 156 MG/DL (ref 70–110)
POCT GLUCOSE: 157 MG/DL (ref 70–110)
POCT GLUCOSE: 161 MG/DL (ref 70–110)
POCT GLUCOSE: 169 MG/DL (ref 70–110)
POCT GLUCOSE: 170 MG/DL (ref 70–110)
POCT GLUCOSE: 172 MG/DL (ref 70–110)
POCT GLUCOSE: 175 MG/DL (ref 70–110)
POCT GLUCOSE: 175 MG/DL (ref 70–110)
POCT GLUCOSE: 176 MG/DL (ref 70–110)
POCT GLUCOSE: 177 MG/DL (ref 70–110)
POCT GLUCOSE: 178 MG/DL (ref 70–110)
POCT GLUCOSE: 181 MG/DL (ref 70–110)
POCT GLUCOSE: 183 MG/DL (ref 70–110)
POCT GLUCOSE: 184 MG/DL (ref 70–110)
POCT GLUCOSE: 188 MG/DL (ref 70–110)
POCT GLUCOSE: 198 MG/DL (ref 70–110)
POCT GLUCOSE: 199 MG/DL (ref 70–110)
POCT GLUCOSE: 202 MG/DL (ref 70–110)
POCT GLUCOSE: 205 MG/DL (ref 70–110)
POCT GLUCOSE: 210 MG/DL (ref 70–110)
POCT GLUCOSE: 211 MG/DL (ref 70–110)
POCT GLUCOSE: 212 MG/DL (ref 70–110)
POCT GLUCOSE: 213 MG/DL (ref 70–110)
POCT GLUCOSE: 213 MG/DL (ref 70–110)
POCT GLUCOSE: 230 MG/DL (ref 70–110)
POCT GLUCOSE: 255 MG/DL (ref 70–110)
POCT GLUCOSE: 274 MG/DL (ref 70–110)
POCT GLUCOSE: 371 MG/DL (ref 70–110)
POCT GLUCOSE: 405 MG/DL (ref 70–110)
POTASSIUM SERPL-SCNC: 3.6 MMOL/L (ref 3.5–5.1)
POTASSIUM SERPL-SCNC: 3.6 MMOL/L (ref 3.5–5.1)
POTASSIUM SERPL-SCNC: 3.7 MMOL/L (ref 3.5–5.1)
POTASSIUM SERPL-SCNC: 3.8 MMOL/L (ref 3.5–5.1)
POTASSIUM SERPL-SCNC: 3.9 MMOL/L (ref 3.5–5.1)
POTASSIUM SERPL-SCNC: 4.1 MMOL/L (ref 3.5–5.1)
POTASSIUM SERPL-SCNC: 4.1 MMOL/L (ref 3.5–5.1)
POTASSIUM SERPL-SCNC: 4.2 MMOL/L (ref 3.5–5.1)
POTASSIUM SERPL-SCNC: 4.5 MMOL/L (ref 3.5–5.1)
PROCALCITONIN SERPL IA-MCNC: 0.87 NG/ML
PROCALCITONIN SERPL IA-MCNC: 1.44 NG/ML
PROT SERPL-MCNC: 7.5 G/DL (ref 6–8.4)
PROT UR QL STRIP: ABNORMAL
PV MEAN GRADIENT: 1 MMHG
PV PEAK GRADIENT: 3 MMHG
PV PEAK VELOCITY: 0.89 M/S
RA PRESSURE ESTIMATED: 3 MMHG
RBC # BLD AUTO: 2.26 M/UL (ref 4.6–6.2)
RBC # BLD AUTO: 2.45 M/UL (ref 4.6–6.2)
RBC # BLD AUTO: 2.53 M/UL (ref 4.6–6.2)
RBC # BLD AUTO: 2.64 M/UL (ref 4.6–6.2)
RBC # BLD AUTO: 2.66 M/UL (ref 4.6–6.2)
RBC # BLD AUTO: 2.68 M/UL (ref 4.6–6.2)
RBC # BLD AUTO: 2.75 M/UL (ref 4.6–6.2)
RBC # BLD AUTO: 2.81 M/UL (ref 4.6–6.2)
RBC # BLD AUTO: 2.81 M/UL (ref 4.6–6.2)
RBC # BLD AUTO: 2.9 M/UL (ref 4.6–6.2)
RBC # BLD AUTO: 3.04 M/UL (ref 4.6–6.2)
RBC #/AREA URNS HPF: 36 /HPF (ref 0–4)
RESPIRATORY INFECTION PANEL SOURCE: NORMAL
RSV RNA NPH QL NAA+NON-PROBE: NOT DETECTED
RV TB RVSP: 6 MMHG
RV+EV RNA NPH QL NAA+NON-PROBE: NOT DETECTED
SAMPLE: ABNORMAL
SAMPLE: ABNORMAL
SARS-COV-2 RNA RESP QL NAA+PROBE: NOT DETECTED
SATURATED IRON: 7 % (ref 20–50)
SITE: ABNORMAL
SITE: ABNORMAL
SODIUM SERPL-SCNC: 129 MMOL/L (ref 136–145)
SODIUM SERPL-SCNC: 131 MMOL/L (ref 136–145)
SODIUM SERPL-SCNC: 132 MMOL/L (ref 136–145)
SODIUM SERPL-SCNC: 132 MMOL/L (ref 136–145)
SODIUM SERPL-SCNC: 133 MMOL/L (ref 136–145)
SODIUM SERPL-SCNC: 134 MMOL/L (ref 136–145)
SODIUM SERPL-SCNC: 134 MMOL/L (ref 136–145)
SODIUM SERPL-SCNC: 135 MMOL/L (ref 136–145)
SODIUM SERPL-SCNC: 135 MMOL/L (ref 136–145)
SODIUM SERPL-SCNC: 136 MMOL/L (ref 136–145)
SODIUM SERPL-SCNC: 137 MMOL/L (ref 136–145)
SP GR UR STRIP: 1.02 (ref 1–1.03)
SPECIMEN OUTDATE: NORMAL
SPECIMEN OUTDATE: NORMAL
SPONT RATE: 22
SQUAMOUS #/AREA URNS HPF: 1 /HPF
STJ: 2.97 CM
TDI LATERAL: 0.09 M/S
TDI SEPTAL: 0.09 M/S
TDI: 0.09 M/S
TOTAL IRON BINDING CAPACITY: 182 UG/DL (ref 250–450)
TR MAX PG: 30 MMHG
TRANSFERRIN SERPL-MCNC: 123 MG/DL (ref 200–375)
TV REST PULMONARY ARTERY PRESSURE: 33 MMHG
URN SPEC COLLECT METH UR: ABNORMAL
UROBILINOGEN UR STRIP-ACNC: NEGATIVE EU/DL
WBC # BLD AUTO: 11.42 K/UL (ref 3.9–12.7)
WBC # BLD AUTO: 12.54 K/UL (ref 3.9–12.7)
WBC # BLD AUTO: 12.93 K/UL (ref 3.9–12.7)
WBC # BLD AUTO: 13.07 K/UL (ref 3.9–12.7)
WBC # BLD AUTO: 14.14 K/UL (ref 3.9–12.7)
WBC # BLD AUTO: 14.59 K/UL (ref 3.9–12.7)
WBC # BLD AUTO: 15.41 K/UL (ref 3.9–12.7)
WBC # BLD AUTO: 18.84 K/UL (ref 3.9–12.7)
WBC # BLD AUTO: 8 K/UL (ref 3.9–12.7)
WBC # BLD AUTO: 8.81 K/UL (ref 3.9–12.7)
WBC # BLD AUTO: 9.68 K/UL (ref 3.9–12.7)
WBC #/AREA URNS HPF: 16 /HPF (ref 0–5)
WBC CLUMPS URNS QL MICRO: ABNORMAL
Z-SCORE OF LEFT VENTRICULAR DIMENSION IN END DIASTOLE: -9.79
Z-SCORE OF LEFT VENTRICULAR DIMENSION IN END SYSTOLE: -6.81

## 2024-01-01 PROCEDURE — 25000003 PHARM REV CODE 250: Performed by: INTERNAL MEDICINE

## 2024-01-01 PROCEDURE — 94761 N-INVAS EAR/PLS OXIMETRY MLT: CPT

## 2024-01-01 PROCEDURE — 94799 UNLISTED PULMONARY SVC/PX: CPT

## 2024-01-01 PROCEDURE — 97535 SELF CARE MNGMENT TRAINING: CPT

## 2024-01-01 PROCEDURE — 63600175 PHARM REV CODE 636 W HCPCS: Performed by: FAMILY MEDICINE

## 2024-01-01 PROCEDURE — 63600175 PHARM REV CODE 636 W HCPCS: Performed by: INTERNAL MEDICINE

## 2024-01-01 PROCEDURE — 25000003 PHARM REV CODE 250: Performed by: HOSPITALIST

## 2024-01-01 PROCEDURE — 25000242 PHARM REV CODE 250 ALT 637 W/ HCPCS: Performed by: FAMILY MEDICINE

## 2024-01-01 PROCEDURE — 83735 ASSAY OF MAGNESIUM: CPT | Performed by: INTERNAL MEDICINE

## 2024-01-01 PROCEDURE — 36415 COLL VENOUS BLD VENIPUNCTURE: CPT | Mod: XB | Performed by: FAMILY MEDICINE

## 2024-01-01 PROCEDURE — 02HV33Z INSERTION OF INFUSION DEVICE INTO SUPERIOR VENA CAVA, PERCUTANEOUS APPROACH: ICD-10-PCS | Performed by: RADIOLOGY

## 2024-01-01 PROCEDURE — 0JH63XZ INSERTION OF TUNNELED VASCULAR ACCESS DEVICE INTO CHEST SUBCUTANEOUS TISSUE AND FASCIA, PERCUTANEOUS APPROACH: ICD-10-PCS | Performed by: RADIOLOGY

## 2024-01-01 PROCEDURE — 94660 CPAP INITIATION&MGMT: CPT

## 2024-01-01 PROCEDURE — 99900035 HC TECH TIME PER 15 MIN (STAT)

## 2024-01-01 PROCEDURE — 99233 SBSQ HOSP IP/OBS HIGH 50: CPT | Mod: ,,, | Performed by: INTERNAL MEDICINE

## 2024-01-01 PROCEDURE — 85025 COMPLETE CBC W/AUTO DIFF WBC: CPT | Performed by: FAMILY MEDICINE

## 2024-01-01 PROCEDURE — 63600175 PHARM REV CODE 636 W HCPCS: Performed by: HOSPITALIST

## 2024-01-01 PROCEDURE — 27100171 HC OXYGEN HIGH FLOW UP TO 24 HOURS

## 2024-01-01 PROCEDURE — 84145 PROCALCITONIN (PCT): CPT | Performed by: NURSE PRACTITIONER

## 2024-01-01 PROCEDURE — 25500020 PHARM REV CODE 255: Performed by: HOSPITALIST

## 2024-01-01 PROCEDURE — 5A09357 ASSISTANCE WITH RESPIRATORY VENTILATION, LESS THAN 24 CONSECUTIVE HOURS, CONTINUOUS POSITIVE AIRWAY PRESSURE: ICD-10-PCS | Performed by: FAMILY MEDICINE

## 2024-01-01 PROCEDURE — 86920 COMPATIBILITY TEST SPIN: CPT | Performed by: FAMILY MEDICINE

## 2024-01-01 PROCEDURE — 27000923 HC TRIALYSIS CATHETER, ANY SIZE

## 2024-01-01 PROCEDURE — 86850 RBC ANTIBODY SCREEN: CPT | Performed by: FAMILY MEDICINE

## 2024-01-01 PROCEDURE — 25000003 PHARM REV CODE 250: Performed by: FAMILY MEDICINE

## 2024-01-01 PROCEDURE — 36415 COLL VENOUS BLD VENIPUNCTURE: CPT | Mod: XB | Performed by: HOSPITALIST

## 2024-01-01 PROCEDURE — 87086 URINE CULTURE/COLONY COUNT: CPT | Performed by: HOSPITALIST

## 2024-01-01 PROCEDURE — 80069 RENAL FUNCTION PANEL: CPT | Performed by: FAMILY MEDICINE

## 2024-01-01 PROCEDURE — 81000 URINALYSIS NONAUTO W/SCOPE: CPT | Performed by: HOSPITALIST

## 2024-01-01 PROCEDURE — 87340 HEPATITIS B SURFACE AG IA: CPT | Performed by: INTERNAL MEDICINE

## 2024-01-01 PROCEDURE — 94640 AIRWAY INHALATION TREATMENT: CPT

## 2024-01-01 PROCEDURE — 27201247 HC HEMODIALYSIS, SET-UP & CANCEL

## 2024-01-01 PROCEDURE — 84145 PROCALCITONIN (PCT): CPT | Performed by: HOSPITALIST

## 2024-01-01 PROCEDURE — P9016 RBC LEUKOCYTES REDUCED: HCPCS | Performed by: FAMILY MEDICINE

## 2024-01-01 PROCEDURE — 83880 ASSAY OF NATRIURETIC PEPTIDE: CPT | Performed by: HOSPITALIST

## 2024-01-01 PROCEDURE — 20000000 HC ICU ROOM

## 2024-01-01 PROCEDURE — 97530 THERAPEUTIC ACTIVITIES: CPT

## 2024-01-01 PROCEDURE — 36415 COLL VENOUS BLD VENIPUNCTURE: CPT | Performed by: FAMILY MEDICINE

## 2024-01-01 PROCEDURE — 83540 ASSAY OF IRON: CPT | Performed by: FAMILY MEDICINE

## 2024-01-01 PROCEDURE — 36415 COLL VENOUS BLD VENIPUNCTURE: CPT | Performed by: HOSPITALIST

## 2024-01-01 PROCEDURE — 21400001 HC TELEMETRY ROOM

## 2024-01-01 PROCEDURE — 27000249 HC VAPOTHERM CIRCUIT

## 2024-01-01 PROCEDURE — 27000190 HC CPAP FULL FACE MASK W/VALVE

## 2024-01-01 PROCEDURE — 36415 COLL VENOUS BLD VENIPUNCTURE: CPT | Mod: XB | Performed by: NURSE PRACTITIONER

## 2024-01-01 PROCEDURE — 30233N1 TRANSFUSION OF NONAUTOLOGOUS RED BLOOD CELLS INTO PERIPHERAL VEIN, PERCUTANEOUS APPROACH: ICD-10-PCS | Performed by: FAMILY MEDICINE

## 2024-01-01 PROCEDURE — 63600175 PHARM REV CODE 636 W HCPCS: Performed by: RADIOLOGY

## 2024-01-01 PROCEDURE — 86704 HEP B CORE ANTIBODY TOTAL: CPT | Performed by: INTERNAL MEDICINE

## 2024-01-01 PROCEDURE — 25000003 PHARM REV CODE 250: Performed by: RADIOLOGY

## 2024-01-01 PROCEDURE — 36415 COLL VENOUS BLD VENIPUNCTURE: CPT | Mod: XB | Performed by: INTERNAL MEDICINE

## 2024-01-01 PROCEDURE — 86850 RBC ANTIBODY SCREEN: CPT | Performed by: HOSPITALIST

## 2024-01-01 PROCEDURE — 80069 RENAL FUNCTION PANEL: CPT | Performed by: INTERNAL MEDICINE

## 2024-01-01 PROCEDURE — 97162 PT EVAL MOD COMPLEX 30 MIN: CPT

## 2024-01-01 PROCEDURE — 5A0935A ASSISTANCE WITH RESPIRATORY VENTILATION, LESS THAN 24 CONSECUTIVE HOURS, HIGH NASAL FLOW/VELOCITY: ICD-10-PCS | Performed by: HOSPITALIST

## 2024-01-01 PROCEDURE — A9698 NON-RAD CONTRAST MATERIALNOC: HCPCS | Performed by: HOSPITALIST

## 2024-01-01 PROCEDURE — 82728 ASSAY OF FERRITIN: CPT | Performed by: FAMILY MEDICINE

## 2024-01-01 PROCEDURE — 80048 BASIC METABOLIC PNL TOTAL CA: CPT | Performed by: HOSPITALIST

## 2024-01-01 PROCEDURE — 87798 DETECT AGENT NOS DNA AMP: CPT | Performed by: FAMILY MEDICINE

## 2024-01-01 PROCEDURE — 85025 COMPLETE CBC W/AUTO DIFF WBC: CPT | Performed by: HOSPITALIST

## 2024-01-01 PROCEDURE — 87205 SMEAR GRAM STAIN: CPT | Performed by: FAMILY MEDICINE

## 2024-01-01 PROCEDURE — 25000003 PHARM REV CODE 250: Performed by: NURSE PRACTITIONER

## 2024-01-01 PROCEDURE — 27000221 HC OXYGEN, UP TO 24 HOURS

## 2024-01-01 PROCEDURE — 36430 TRANSFUSION BLD/BLD COMPNT: CPT

## 2024-01-01 PROCEDURE — 36415 COLL VENOUS BLD VENIPUNCTURE: CPT | Performed by: NURSE PRACTITIONER

## 2024-01-01 PROCEDURE — 80074 ACUTE HEPATITIS PANEL: CPT | Performed by: FAMILY MEDICINE

## 2024-01-01 PROCEDURE — 5A1D70Z PERFORMANCE OF URINARY FILTRATION, INTERMITTENT, LESS THAN 6 HOURS PER DAY: ICD-10-PCS | Performed by: FAMILY MEDICINE

## 2024-01-01 PROCEDURE — 86706 HEP B SURFACE ANTIBODY: CPT | Performed by: FAMILY MEDICINE

## 2024-01-01 PROCEDURE — 99223 1ST HOSP IP/OBS HIGH 75: CPT | Mod: 25,,, | Performed by: INTERNAL MEDICINE

## 2024-01-01 PROCEDURE — 87040 BLOOD CULTURE FOR BACTERIA: CPT | Performed by: HOSPITALIST

## 2024-01-01 PROCEDURE — 63600175 PHARM REV CODE 636 W HCPCS: Mod: JZ,JG | Performed by: NURSE PRACTITIONER

## 2024-01-01 PROCEDURE — 63600175 PHARM REV CODE 636 W HCPCS: Mod: JZ,JG | Performed by: INTERNAL MEDICINE

## 2024-01-01 PROCEDURE — 86706 HEP B SURFACE ANTIBODY: CPT | Performed by: INTERNAL MEDICINE

## 2024-01-01 PROCEDURE — 80100014 HC HEMODIALYSIS 1:1

## 2024-01-01 PROCEDURE — 87040 BLOOD CULTURE FOR BACTERIA: CPT | Performed by: NURSE PRACTITIONER

## 2024-01-01 PROCEDURE — 99223 1ST HOSP IP/OBS HIGH 75: CPT | Mod: ,,, | Performed by: INTERNAL MEDICINE

## 2024-01-01 PROCEDURE — 80053 COMPREHEN METABOLIC PANEL: CPT | Performed by: HOSPITALIST

## 2024-01-01 PROCEDURE — 97166 OT EVAL MOD COMPLEX 45 MIN: CPT

## 2024-01-01 PROCEDURE — 90935 HEMODIALYSIS ONE EVALUATION: CPT

## 2024-01-01 PROCEDURE — 83880 ASSAY OF NATRIURETIC PEPTIDE: CPT | Performed by: INTERNAL MEDICINE

## 2024-01-01 PROCEDURE — 83036 HEMOGLOBIN GLYCOSYLATED A1C: CPT | Performed by: HOSPITALIST

## 2024-01-01 PROCEDURE — 83605 ASSAY OF LACTIC ACID: CPT | Performed by: HOSPITALIST

## 2024-01-01 PROCEDURE — 51702 INSERT TEMP BLADDER CATH: CPT

## 2024-01-01 RX ORDER — MORPHINE SULFATE 4 MG/ML
4 INJECTION, SOLUTION INTRAMUSCULAR; INTRAVENOUS
Status: DISCONTINUED | OUTPATIENT
Start: 2024-01-01 | End: 2024-01-01 | Stop reason: HOSPADM

## 2024-01-01 RX ORDER — INSULIN ASPART 100 [IU]/ML
0-10 INJECTION, SOLUTION INTRAVENOUS; SUBCUTANEOUS EVERY 6 HOURS PRN
Status: DISCONTINUED | OUTPATIENT
Start: 2024-01-01 | End: 2024-01-01 | Stop reason: HOSPADM

## 2024-01-01 RX ORDER — POLYETHYLENE GLYCOL 3350 17 G/17G
17 POWDER, FOR SOLUTION ORAL 2 TIMES DAILY PRN
Status: DISCONTINUED | OUTPATIENT
Start: 2024-01-01 | End: 2024-01-01 | Stop reason: HOSPADM

## 2024-01-01 RX ORDER — METHYLPREDNISOLONE SOD SUCC 125 MG
125 VIAL (EA) INJECTION ONCE
Status: DISCONTINUED | OUTPATIENT
Start: 2024-01-01 | End: 2024-01-01

## 2024-01-01 RX ORDER — FERROUS SULFATE 325(65) MG
325 TABLET, DELAYED RELEASE (ENTERIC COATED) ORAL DAILY
COMMUNITY

## 2024-01-01 RX ORDER — MUPIROCIN 20 MG/G
OINTMENT TOPICAL 2 TIMES DAILY
Status: COMPLETED | OUTPATIENT
Start: 2024-01-01 | End: 2024-01-01

## 2024-01-01 RX ORDER — AMLODIPINE BESYLATE 10 MG/1
10 TABLET ORAL DAILY
Status: DISCONTINUED | OUTPATIENT
Start: 2024-01-01 | End: 2024-01-01

## 2024-01-01 RX ORDER — FAMOTIDINE 10 MG/ML
40 INJECTION INTRAVENOUS ONCE
Status: DISCONTINUED | OUTPATIENT
Start: 2024-01-01 | End: 2024-01-01

## 2024-01-01 RX ORDER — HYDROCODONE BITARTRATE AND ACETAMINOPHEN 500; 5 MG/1; MG/1
TABLET ORAL
Status: DISCONTINUED | OUTPATIENT
Start: 2024-01-01 | End: 2024-01-01 | Stop reason: HOSPADM

## 2024-01-01 RX ORDER — DULOXETIN HYDROCHLORIDE 30 MG/1
60 CAPSULE, DELAYED RELEASE ORAL DAILY
Status: DISCONTINUED | OUTPATIENT
Start: 2024-01-01 | End: 2024-01-01

## 2024-01-01 RX ORDER — HYDRALAZINE HYDROCHLORIDE 50 MG/1
50 TABLET, FILM COATED ORAL 3 TIMES DAILY
Status: DISCONTINUED | OUTPATIENT
Start: 2024-01-01 | End: 2024-01-01

## 2024-01-01 RX ORDER — CARVEDILOL 12.5 MG/1
25 TABLET ORAL 2 TIMES DAILY WITH MEALS
Status: DISCONTINUED | OUTPATIENT
Start: 2024-01-01 | End: 2024-01-01

## 2024-01-01 RX ORDER — MIDAZOLAM HYDROCHLORIDE 1 MG/ML
2 INJECTION INTRAMUSCULAR; INTRAVENOUS
Status: DISCONTINUED | OUTPATIENT
Start: 2024-01-21 | End: 2024-01-01 | Stop reason: HOSPADM

## 2024-01-01 RX ORDER — TRAZODONE HYDROCHLORIDE 50 MG/1
100 TABLET ORAL NIGHTLY PRN
Status: DISCONTINUED | OUTPATIENT
Start: 2024-01-01 | End: 2024-01-01 | Stop reason: HOSPADM

## 2024-01-01 RX ORDER — BUMETANIDE 0.25 MG/ML
2 INJECTION INTRAMUSCULAR; INTRAVENOUS 2 TIMES DAILY
Status: DISCONTINUED | OUTPATIENT
Start: 2024-01-01 | End: 2024-01-01

## 2024-01-01 RX ORDER — MORPHINE SULFATE 2 MG/ML
2 INJECTION, SOLUTION INTRAMUSCULAR; INTRAVENOUS EVERY 6 HOURS PRN
Status: DISCONTINUED | OUTPATIENT
Start: 2024-01-01 | End: 2024-01-01

## 2024-01-01 RX ORDER — SODIUM BICARBONATE 650 MG/1
650 TABLET ORAL 3 TIMES DAILY
Status: DISCONTINUED | OUTPATIENT
Start: 2024-01-01 | End: 2024-01-01

## 2024-01-01 RX ORDER — HYDRALAZINE HYDROCHLORIDE 50 MG/1
100 TABLET, FILM COATED ORAL 3 TIMES DAILY
Status: DISCONTINUED | OUTPATIENT
Start: 2024-01-01 | End: 2024-01-01

## 2024-01-01 RX ORDER — ACETAMINOPHEN 325 MG/1
650 TABLET ORAL EVERY 6 HOURS PRN
Status: DISCONTINUED | OUTPATIENT
Start: 2024-01-01 | End: 2024-01-01 | Stop reason: HOSPADM

## 2024-01-01 RX ORDER — CLONIDINE HYDROCHLORIDE 0.1 MG/1
0.1 TABLET ORAL 2 TIMES DAILY
Status: DISCONTINUED | OUTPATIENT
Start: 2024-01-01 | End: 2024-01-01

## 2024-01-01 RX ORDER — MANNITOL 250 MG/ML
25 INJECTION, SOLUTION INTRAVENOUS ONCE
Status: DISCONTINUED | OUTPATIENT
Start: 2024-01-01 | End: 2024-01-01

## 2024-01-01 RX ORDER — HEPARIN SODIUM 1000 [USP'U]/ML
1000 INJECTION, SOLUTION INTRAVENOUS; SUBCUTANEOUS
Status: CANCELLED | OUTPATIENT
Start: 2024-01-01 | End: 2024-01-01

## 2024-01-01 RX ORDER — METOLAZONE 5 MG/1
5 TABLET ORAL DAILY
Status: DISCONTINUED | OUTPATIENT
Start: 2024-01-01 | End: 2024-01-01

## 2024-01-01 RX ORDER — PANTOPRAZOLE SODIUM 40 MG/1
40 TABLET, DELAYED RELEASE ORAL DAILY
COMMUNITY

## 2024-01-01 RX ORDER — LIDOCAINE HYDROCHLORIDE 10 MG/ML
INJECTION INFILTRATION; PERINEURAL CODE/TRAUMA/SEDATION MEDICATION
Status: COMPLETED | OUTPATIENT
Start: 2024-01-01 | End: 2024-01-01

## 2024-01-01 RX ORDER — FUROSEMIDE 10 MG/ML
100 INJECTION INTRAMUSCULAR; INTRAVENOUS ONCE
Status: COMPLETED | OUTPATIENT
Start: 2024-01-01 | End: 2024-01-01

## 2024-01-01 RX ORDER — SEVELAMER CARBONATE 800 MG/1
1600 TABLET, FILM COATED ORAL
Status: DISCONTINUED | OUTPATIENT
Start: 2024-01-01 | End: 2024-01-01

## 2024-01-01 RX ORDER — HYDRALAZINE HYDROCHLORIDE 20 MG/ML
10 INJECTION INTRAMUSCULAR; INTRAVENOUS EVERY 6 HOURS PRN
Status: DISCONTINUED | OUTPATIENT
Start: 2024-01-01 | End: 2024-01-01 | Stop reason: HOSPADM

## 2024-01-01 RX ORDER — IBUPROFEN 200 MG
16 TABLET ORAL
Status: DISCONTINUED | OUTPATIENT
Start: 2024-01-01 | End: 2024-01-01 | Stop reason: HOSPADM

## 2024-01-01 RX ORDER — LEVOFLOXACIN 5 MG/ML
500 INJECTION, SOLUTION INTRAVENOUS
Status: DISCONTINUED | OUTPATIENT
Start: 2024-01-01 | End: 2024-01-01 | Stop reason: DRUGHIGH

## 2024-01-01 RX ORDER — ATORVASTATIN CALCIUM 40 MG/1
40 TABLET, FILM COATED ORAL NIGHTLY
Status: DISCONTINUED | OUTPATIENT
Start: 2024-01-01 | End: 2024-01-01

## 2024-01-01 RX ORDER — LEVOFLOXACIN 5 MG/ML
500 INJECTION, SOLUTION INTRAVENOUS
Status: COMPLETED | OUTPATIENT
Start: 2024-01-01 | End: 2024-01-01

## 2024-01-01 RX ORDER — DOXYCYCLINE HYCLATE 100 MG
100 TABLET ORAL EVERY 12 HOURS
Status: DISCONTINUED | OUTPATIENT
Start: 2024-01-01 | End: 2024-01-01

## 2024-01-01 RX ORDER — SODIUM CHLORIDE 0.9 % (FLUSH) 0.9 %
10 SYRINGE (ML) INJECTION EVERY 12 HOURS PRN
Status: DISCONTINUED | OUTPATIENT
Start: 2024-01-01 | End: 2024-01-01 | Stop reason: HOSPADM

## 2024-01-01 RX ORDER — BUMETANIDE 1 MG/1
2 TABLET ORAL EVERY 12 HOURS
Status: DISCONTINUED | OUTPATIENT
Start: 2024-01-01 | End: 2024-01-01

## 2024-01-01 RX ORDER — MORPHINE SULFATE 2 MG/ML
2 INJECTION, SOLUTION INTRAMUSCULAR; INTRAVENOUS ONCE
Status: COMPLETED | OUTPATIENT
Start: 2024-01-01 | End: 2024-01-01

## 2024-01-01 RX ORDER — IBUPROFEN 200 MG
24 TABLET ORAL
Status: DISCONTINUED | OUTPATIENT
Start: 2024-01-01 | End: 2024-01-01

## 2024-01-01 RX ORDER — BUMETANIDE 2 MG/1
2 TABLET ORAL 2 TIMES DAILY
COMMUNITY

## 2024-01-01 RX ORDER — BUMETANIDE 0.25 MG/ML
2 INJECTION INTRAMUSCULAR; INTRAVENOUS EVERY 8 HOURS
Status: DISCONTINUED | OUTPATIENT
Start: 2024-01-01 | End: 2024-01-01

## 2024-01-01 RX ORDER — ENOXAPARIN SODIUM 100 MG/ML
30 INJECTION SUBCUTANEOUS EVERY 24 HOURS
Status: DISCONTINUED | OUTPATIENT
Start: 2024-01-01 | End: 2024-01-01

## 2024-01-01 RX ORDER — FLUTICASONE PROPIONATE 50 MCG
2 SPRAY, SUSPENSION (ML) NASAL DAILY
Status: DISCONTINUED | OUTPATIENT
Start: 2024-01-01 | End: 2024-01-01

## 2024-01-01 RX ORDER — METOLAZONE 5 MG/1
5 TABLET ORAL ONCE
Status: COMPLETED | OUTPATIENT
Start: 2024-01-01 | End: 2024-01-01

## 2024-01-01 RX ORDER — GLUCAGON 1 MG
1 KIT INJECTION
Status: DISCONTINUED | OUTPATIENT
Start: 2024-01-01 | End: 2024-01-01

## 2024-01-01 RX ORDER — HEPARIN SODIUM 1000 [USP'U]/ML
INJECTION, SOLUTION INTRAVENOUS; SUBCUTANEOUS CODE/TRAUMA/SEDATION MEDICATION
Status: COMPLETED | OUTPATIENT
Start: 2024-01-01 | End: 2024-01-01

## 2024-01-01 RX ORDER — DIPHENHYDRAMINE HYDROCHLORIDE 50 MG/ML
INJECTION INTRAMUSCULAR; INTRAVENOUS CODE/TRAUMA/SEDATION MEDICATION
Status: COMPLETED | OUTPATIENT
Start: 2024-01-01 | End: 2024-01-01

## 2024-01-01 RX ORDER — IPRATROPIUM BROMIDE AND ALBUTEROL SULFATE 2.5; .5 MG/3ML; MG/3ML
3 SOLUTION RESPIRATORY (INHALATION) EVERY 6 HOURS PRN
Status: DISCONTINUED | OUTPATIENT
Start: 2024-01-01 | End: 2024-01-01 | Stop reason: HOSPADM

## 2024-01-01 RX ORDER — ISOSORBIDE MONONITRATE 60 MG/1
60 TABLET, EXTENDED RELEASE ORAL DAILY
Status: DISCONTINUED | OUTPATIENT
Start: 2024-01-01 | End: 2024-01-01

## 2024-01-01 RX ORDER — TAMSULOSIN HYDROCHLORIDE 0.4 MG/1
1 CAPSULE ORAL DAILY
Status: DISCONTINUED | OUTPATIENT
Start: 2024-01-01 | End: 2024-01-01

## 2024-01-01 RX ORDER — NALOXONE HCL 0.4 MG/ML
0.02 VIAL (ML) INJECTION
Status: DISCONTINUED | OUTPATIENT
Start: 2024-01-01 | End: 2024-01-01 | Stop reason: HOSPADM

## 2024-01-01 RX ORDER — SERTRALINE HYDROCHLORIDE 100 MG/1
100 TABLET, FILM COATED ORAL DAILY
COMMUNITY

## 2024-01-01 RX ORDER — GABAPENTIN 300 MG/1
300 CAPSULE ORAL 2 TIMES DAILY
Status: DISCONTINUED | OUTPATIENT
Start: 2024-01-01 | End: 2024-01-01

## 2024-01-01 RX ORDER — IBUPROFEN 200 MG
16 TABLET ORAL
Status: DISCONTINUED | OUTPATIENT
Start: 2024-01-01 | End: 2024-01-01

## 2024-01-01 RX ORDER — GABAPENTIN 300 MG/1
300 CAPSULE ORAL 3 TIMES DAILY
Status: DISCONTINUED | OUTPATIENT
Start: 2024-01-01 | End: 2024-01-01

## 2024-01-01 RX ORDER — INSULIN ASPART 100 [IU]/ML
0-10 INJECTION, SOLUTION INTRAVENOUS; SUBCUTANEOUS EVERY 6 HOURS
Status: DISCONTINUED | OUTPATIENT
Start: 2024-01-01 | End: 2024-01-01

## 2024-01-01 RX ORDER — LEVOFLOXACIN 5 MG/ML
750 INJECTION, SOLUTION INTRAVENOUS
Status: DISCONTINUED | OUTPATIENT
Start: 2024-01-01 | End: 2024-01-01

## 2024-01-01 RX ORDER — LANOLIN ALCOHOL/MO/W.PET/CERES
1 CREAM (GRAM) TOPICAL DAILY
Status: DISCONTINUED | OUTPATIENT
Start: 2024-01-01 | End: 2024-01-01

## 2024-01-01 RX ORDER — IBUPROFEN 200 MG
24 TABLET ORAL
Status: DISCONTINUED | OUTPATIENT
Start: 2024-01-01 | End: 2024-01-01 | Stop reason: HOSPADM

## 2024-01-01 RX ORDER — DIPHENHYDRAMINE HYDROCHLORIDE 50 MG/ML
50 INJECTION INTRAMUSCULAR; INTRAVENOUS ONCE
Status: DISCONTINUED | OUTPATIENT
Start: 2024-01-01 | End: 2024-01-01

## 2024-01-01 RX ORDER — HEPARIN SODIUM 1000 [USP'U]/ML
3600 INJECTION, SOLUTION INTRAVENOUS; SUBCUTANEOUS
Status: DISCONTINUED | OUTPATIENT
Start: 2024-01-01 | End: 2024-01-01 | Stop reason: HOSPADM

## 2024-01-01 RX ORDER — INSULIN ASPART 100 [IU]/ML
0-10 INJECTION, SOLUTION INTRAVENOUS; SUBCUTANEOUS
Status: DISCONTINUED | OUTPATIENT
Start: 2024-01-01 | End: 2024-01-01

## 2024-01-01 RX ORDER — LANOLIN ALCOHOL/MO/W.PET/CERES
400 CREAM (GRAM) TOPICAL 2 TIMES DAILY
COMMUNITY

## 2024-01-01 RX ORDER — GLUCAGON 1 MG
1 KIT INJECTION
Status: DISCONTINUED | OUTPATIENT
Start: 2024-01-01 | End: 2024-01-01 | Stop reason: HOSPADM

## 2024-01-01 RX ORDER — GABAPENTIN 300 MG/1
300 CAPSULE ORAL NIGHTLY
Status: DISCONTINUED | OUTPATIENT
Start: 2024-01-01 | End: 2024-01-01

## 2024-01-01 RX ORDER — CLOPIDOGREL BISULFATE 75 MG/1
75 TABLET ORAL DAILY
Status: DISCONTINUED | OUTPATIENT
Start: 2024-01-01 | End: 2024-01-01

## 2024-01-01 RX ORDER — LACTULOSE 10 G/15ML
30 SOLUTION ORAL EVERY 4 HOURS
Status: DISPENSED | OUTPATIENT
Start: 2024-01-01 | End: 2024-01-01

## 2024-01-01 RX ADMIN — SODIUM BICARBONATE 650 MG: 650 TABLET ORAL at 09:01

## 2024-01-01 RX ADMIN — ATORVASTATIN CALCIUM 40 MG: 40 TABLET, FILM COATED ORAL at 09:01

## 2024-01-01 RX ADMIN — HYDRALAZINE HYDROCHLORIDE 100 MG: 50 TABLET ORAL at 09:01

## 2024-01-01 RX ADMIN — GABAPENTIN 300 MG: 300 CAPSULE ORAL at 09:01

## 2024-01-01 RX ADMIN — DULOXETINE HYDROCHLORIDE 60 MG: 30 CAPSULE, DELAYED RELEASE ORAL at 08:01

## 2024-01-01 RX ADMIN — AMLODIPINE BESYLATE 10 MG: 10 TABLET ORAL at 08:01

## 2024-01-01 RX ADMIN — INSULIN DETEMIR 14 UNITS: 100 INJECTION, SOLUTION SUBCUTANEOUS at 09:01

## 2024-01-01 RX ADMIN — GABAPENTIN 300 MG: 300 CAPSULE ORAL at 08:01

## 2024-01-01 RX ADMIN — TAMSULOSIN HYDROCHLORIDE 0.4 MG: 0.4 CAPSULE ORAL at 09:01

## 2024-01-01 RX ADMIN — SEVELAMER CARBONATE 1600 MG: 800 TABLET, FILM COATED ORAL at 09:01

## 2024-01-01 RX ADMIN — ISOSORBIDE MONONITRATE 60 MG: 60 TABLET, EXTENDED RELEASE ORAL at 09:01

## 2024-01-01 RX ADMIN — ENOXAPARIN SODIUM 30 MG: 30 INJECTION SUBCUTANEOUS at 05:01

## 2024-01-01 RX ADMIN — TRAZODONE HYDROCHLORIDE 100 MG: 100 TABLET ORAL at 08:01

## 2024-01-01 RX ADMIN — HYDRALAZINE HYDROCHLORIDE 50 MG: 50 TABLET ORAL at 03:01

## 2024-01-01 RX ADMIN — INSULIN ASPART 4 UNITS: 100 INJECTION, SOLUTION INTRAVENOUS; SUBCUTANEOUS at 04:01

## 2024-01-01 RX ADMIN — CARVEDILOL 25 MG: 12.5 TABLET, FILM COATED ORAL at 06:01

## 2024-01-01 RX ADMIN — HYDRALAZINE HYDROCHLORIDE 100 MG: 50 TABLET ORAL at 08:01

## 2024-01-01 RX ADMIN — LEVOFLOXACIN 500 MG: 500 INJECTION, SOLUTION INTRAVENOUS at 09:01

## 2024-01-01 RX ADMIN — AMLODIPINE BESYLATE 10 MG: 10 TABLET ORAL at 09:01

## 2024-01-01 RX ADMIN — BUMETANIDE 2 MG: 0.25 INJECTION INTRAMUSCULAR; INTRAVENOUS at 08:01

## 2024-01-01 RX ADMIN — CARVEDILOL 25 MG: 12.5 TABLET, FILM COATED ORAL at 05:01

## 2024-01-01 RX ADMIN — MUPIROCIN: 20 OINTMENT TOPICAL at 09:01

## 2024-01-01 RX ADMIN — HYDRALAZINE HYDROCHLORIDE 100 MG: 50 TABLET ORAL at 03:01

## 2024-01-01 RX ADMIN — TAMSULOSIN HYDROCHLORIDE 0.4 MG: 0.4 CAPSULE ORAL at 08:01

## 2024-01-01 RX ADMIN — INSULIN ASPART 1 UNITS: 100 INJECTION, SOLUTION INTRAVENOUS; SUBCUTANEOUS at 09:01

## 2024-01-01 RX ADMIN — MUPIROCIN: 20 OINTMENT TOPICAL at 08:01

## 2024-01-01 RX ADMIN — BUMETANIDE 2 MG: 0.25 INJECTION INTRAMUSCULAR; INTRAVENOUS at 04:01

## 2024-01-01 RX ADMIN — CARVEDILOL 25 MG: 12.5 TABLET, FILM COATED ORAL at 04:01

## 2024-01-01 RX ADMIN — HYDRALAZINE HYDROCHLORIDE 100 MG: 50 TABLET ORAL at 11:01

## 2024-01-01 RX ADMIN — ATORVASTATIN CALCIUM 40 MG: 40 TABLET, FILM COATED ORAL at 08:01

## 2024-01-01 RX ADMIN — BUMETANIDE 2 MG: 0.25 INJECTION INTRAMUSCULAR; INTRAVENOUS at 09:01

## 2024-01-01 RX ADMIN — SODIUM BICARBONATE 650 MG: 650 TABLET ORAL at 03:01

## 2024-01-01 RX ADMIN — CLOPIDOGREL BISULFATE 75 MG: 75 TABLET ORAL at 09:01

## 2024-01-01 RX ADMIN — BUMETANIDE 2 MG: 1 TABLET ORAL at 08:01

## 2024-01-01 RX ADMIN — HYDRALAZINE HYDROCHLORIDE 100 MG: 50 TABLET ORAL at 04:01

## 2024-01-01 RX ADMIN — FERROUS SULFATE TAB 325 MG (65 MG ELEMENTAL FE) 1 EACH: 325 (65 FE) TAB at 09:01

## 2024-01-01 RX ADMIN — SODIUM BICARBONATE 650 MG: 650 TABLET ORAL at 10:01

## 2024-01-01 RX ADMIN — FLUTICASONE PROPIONATE 100 MCG: 50 SPRAY, METERED NASAL at 09:01

## 2024-01-01 RX ADMIN — METOLAZONE 5 MG: 5 TABLET ORAL at 09:01

## 2024-01-01 RX ADMIN — ENOXAPARIN SODIUM 30 MG: 30 INJECTION SUBCUTANEOUS at 04:01

## 2024-01-01 RX ADMIN — BUMETANIDE 2 MG: 0.25 INJECTION INTRAMUSCULAR; INTRAVENOUS at 03:01

## 2024-01-01 RX ADMIN — SODIUM BICARBONATE 650 MG: 650 TABLET ORAL at 08:01

## 2024-01-01 RX ADMIN — BUMETANIDE 2 MG: 0.25 INJECTION INTRAMUSCULAR; INTRAVENOUS at 11:01

## 2024-01-01 RX ADMIN — GABAPENTIN 300 MG: 300 CAPSULE ORAL at 03:01

## 2024-01-01 RX ADMIN — INSULIN DETEMIR 14 UNITS: 100 INJECTION, SOLUTION SUBCUTANEOUS at 08:01

## 2024-01-01 RX ADMIN — HYDRALAZINE HYDROCHLORIDE 100 MG: 50 TABLET ORAL at 10:01

## 2024-01-01 RX ADMIN — IRON SUCROSE 200 MG: 20 INJECTION, SOLUTION INTRAVENOUS at 05:01

## 2024-01-01 RX ADMIN — LACTULOSE 30 G: 20 SOLUTION ORAL at 09:01

## 2024-01-01 RX ADMIN — ISOSORBIDE MONONITRATE 60 MG: 60 TABLET, EXTENDED RELEASE ORAL at 08:01

## 2024-01-01 RX ADMIN — HYDRALAZINE HYDROCHLORIDE 50 MG: 50 TABLET ORAL at 09:01

## 2024-01-01 RX ADMIN — CLOPIDOGREL BISULFATE 75 MG: 75 TABLET ORAL at 08:01

## 2024-01-01 RX ADMIN — DOXYCYCLINE HYCLATE 100 MG: 100 TABLET, COATED ORAL at 09:01

## 2024-01-01 RX ADMIN — BUMETANIDE 2 MG: 0.25 INJECTION INTRAMUSCULAR; INTRAVENOUS at 10:01

## 2024-01-01 RX ADMIN — DULOXETINE HYDROCHLORIDE 60 MG: 30 CAPSULE, DELAYED RELEASE ORAL at 09:01

## 2024-01-01 RX ADMIN — TRAZODONE HYDROCHLORIDE 100 MG: 100 TABLET ORAL at 09:01

## 2024-01-01 RX ADMIN — INSULIN ASPART 2 UNITS: 100 INJECTION, SOLUTION INTRAVENOUS; SUBCUTANEOUS at 12:01

## 2024-01-01 RX ADMIN — SODIUM BICARBONATE 650 MG: 650 TABLET ORAL at 04:01

## 2024-01-01 RX ADMIN — BUMETANIDE 2 MG: 1 TABLET ORAL at 09:01

## 2024-01-01 RX ADMIN — LEVOFLOXACIN 750 MG: 750 INJECTION, SOLUTION INTRAVENOUS at 08:01

## 2024-01-01 RX ADMIN — INSULIN ASPART 10 UNITS: 100 INJECTION, SOLUTION INTRAVENOUS; SUBCUTANEOUS at 05:01

## 2024-01-01 RX ADMIN — INSULIN ASPART 4 UNITS: 100 INJECTION, SOLUTION INTRAVENOUS; SUBCUTANEOUS at 03:01

## 2024-01-01 RX ADMIN — CARVEDILOL 25 MG: 12.5 TABLET, FILM COATED ORAL at 08:01

## 2024-01-01 RX ADMIN — EPOETIN ALFA 6300 UNITS: 10000 SOLUTION INTRAVENOUS; SUBCUTANEOUS at 09:01

## 2024-01-01 RX ADMIN — CARVEDILOL 25 MG: 12.5 TABLET, FILM COATED ORAL at 09:01

## 2024-01-01 RX ADMIN — CLONIDINE HYDROCHLORIDE 0.1 MG: 0.1 TABLET ORAL at 09:01

## 2024-01-01 RX ADMIN — HYDRALAZINE HYDROCHLORIDE 75 MG: 50 TABLET ORAL at 10:01

## 2024-01-01 RX ADMIN — IRON SUCROSE 200 MG: 20 INJECTION, SOLUTION INTRAVENOUS at 08:01

## 2024-01-01 RX ADMIN — TRAZODONE HYDROCHLORIDE 100 MG: 100 TABLET ORAL at 10:01

## 2024-01-01 RX ADMIN — IRON SUCROSE 200 MG: 20 INJECTION, SOLUTION INTRAVENOUS at 09:01

## 2024-01-01 RX ADMIN — GABAPENTIN 300 MG: 300 CAPSULE ORAL at 10:01

## 2024-01-01 RX ADMIN — SEVELAMER CARBONATE 1600 MG: 800 TABLET, FILM COATED ORAL at 05:01

## 2024-01-01 RX ADMIN — ACETAMINOPHEN 650 MG: 325 TABLET ORAL at 07:01

## 2024-01-01 RX ADMIN — MORPHINE SULFATE 2 MG: 2 INJECTION, SOLUTION INTRAMUSCULAR; INTRAVENOUS at 07:01

## 2024-01-01 RX ADMIN — HEPARIN SODIUM 2900 UNITS: 1000 INJECTION, SOLUTION INTRAVENOUS; SUBCUTANEOUS at 12:01

## 2024-01-01 RX ADMIN — FLUTICASONE PROPIONATE 100 MCG: 50 SPRAY, METERED NASAL at 08:01

## 2024-01-01 RX ADMIN — CARVEDILOL 25 MG: 12.5 TABLET, FILM COATED ORAL at 07:01

## 2024-01-01 RX ADMIN — SEVELAMER CARBONATE 1600 MG: 800 TABLET, FILM COATED ORAL at 07:01

## 2024-01-01 RX ADMIN — FUROSEMIDE 100 MG: 10 INJECTION, SOLUTION INTRAMUSCULAR; INTRAVENOUS at 11:01

## 2024-01-01 RX ADMIN — MORPHINE SULFATE 2 MG: 2 INJECTION, SOLUTION INTRAMUSCULAR; INTRAVENOUS at 10:01

## 2024-01-01 RX ADMIN — METOLAZONE 5 MG: 5 TABLET ORAL at 08:01

## 2024-01-01 RX ADMIN — INSULIN ASPART 3 UNITS: 100 INJECTION, SOLUTION INTRAVENOUS; SUBCUTANEOUS at 10:01

## 2024-01-01 RX ADMIN — DOXYCYCLINE HYCLATE 100 MG: 100 TABLET, COATED ORAL at 10:01

## 2024-01-01 RX ADMIN — METOLAZONE 5 MG: 5 TABLET ORAL at 06:01

## 2024-01-01 RX ADMIN — IRON SUCROSE 200 MG: 20 INJECTION, SOLUTION INTRAVENOUS at 03:01

## 2024-01-01 RX ADMIN — INSULIN ASPART 6 UNITS: 100 INJECTION, SOLUTION INTRAVENOUS; SUBCUTANEOUS at 12:01

## 2024-01-01 RX ADMIN — ATORVASTATIN CALCIUM 40 MG: 40 TABLET, FILM COATED ORAL at 10:01

## 2024-01-01 RX ADMIN — INSULIN DETEMIR 10 UNITS: 100 INJECTION, SOLUTION SUBCUTANEOUS at 05:01

## 2024-01-01 RX ADMIN — INSULIN ASPART 2 UNITS: 100 INJECTION, SOLUTION INTRAVENOUS; SUBCUTANEOUS at 09:01

## 2024-01-01 RX ADMIN — FLUTICASONE PROPIONATE 100 MCG: 50 SPRAY, METERED NASAL at 10:01

## 2024-01-01 RX ADMIN — INSULIN ASPART 5 UNITS: 100 INJECTION, SOLUTION INTRAVENOUS; SUBCUTANEOUS at 09:01

## 2024-01-01 RX ADMIN — INSULIN DETEMIR 10 UNITS: 100 INJECTION, SOLUTION SUBCUTANEOUS at 09:01

## 2024-01-01 RX ADMIN — LEVOFLOXACIN 750 MG: 750 INJECTION, SOLUTION INTRAVENOUS at 09:01

## 2024-01-01 RX ADMIN — CLONIDINE HYDROCHLORIDE 0.1 MG: 0.1 TABLET ORAL at 03:01

## 2024-01-01 RX ADMIN — INSULIN ASPART 0 UNITS: 100 INJECTION, SOLUTION INTRAVENOUS; SUBCUTANEOUS at 12:01

## 2024-01-01 RX ADMIN — INSULIN ASPART 2 UNITS: 100 INJECTION, SOLUTION INTRAVENOUS; SUBCUTANEOUS at 06:01

## 2024-01-01 RX ADMIN — IPRATROPIUM BROMIDE AND ALBUTEROL SULFATE 3 ML: .5; 3 SOLUTION RESPIRATORY (INHALATION) at 09:01

## 2024-01-01 RX ADMIN — INSULIN ASPART 4 UNITS: 100 INJECTION, SOLUTION INTRAVENOUS; SUBCUTANEOUS at 05:01

## 2024-01-01 RX ADMIN — CLONIDINE HYDROCHLORIDE 0.1 MG: 0.1 TABLET ORAL at 08:01

## 2024-01-01 RX ADMIN — HYDRALAZINE HYDROCHLORIDE 10 MG: 20 INJECTION, SOLUTION INTRAMUSCULAR; INTRAVENOUS at 04:01

## 2024-01-01 RX ADMIN — BUMETANIDE 2 MG: 0.25 INJECTION INTRAMUSCULAR; INTRAVENOUS at 05:01

## 2024-01-01 RX ADMIN — LIDOCAINE HYDROCHLORIDE 5 ML: 10 INJECTION, SOLUTION INFILTRATION; PERINEURAL at 12:01

## 2024-01-01 RX ADMIN — MUPIROCIN: 20 OINTMENT TOPICAL at 10:01

## 2024-01-01 RX ADMIN — INSULIN ASPART 2 UNITS: 100 INJECTION, SOLUTION INTRAVENOUS; SUBCUTANEOUS at 04:01

## 2024-01-01 RX ADMIN — DIPHENHYDRAMINE HYDROCHLORIDE 25 MG: 50 INJECTION INTRAMUSCULAR; INTRAVENOUS at 12:01

## 2024-01-01 RX ADMIN — HYDRALAZINE HYDROCHLORIDE 50 MG: 50 TABLET ORAL at 08:01

## 2024-01-01 RX ADMIN — ATORVASTATIN CALCIUM 40 MG: 40 TABLET, FILM COATED ORAL at 11:01

## 2024-01-01 RX ADMIN — EPOETIN ALFA 7000 UNITS: 10000 SOLUTION INTRAVENOUS; SUBCUTANEOUS at 05:01

## 2024-01-01 RX ADMIN — POLYETHYLENE GLYCOL 3350 17 G: 17 POWDER, FOR SOLUTION ORAL at 08:01

## 2024-01-01 RX ADMIN — METOLAZONE 5 MG: 5 TABLET ORAL at 03:01

## 2024-01-01 RX ADMIN — MORPHINE SULFATE 4 MG: 4 INJECTION INTRAVENOUS at 09:01

## 2024-01-01 RX ADMIN — POLYETHYLENE GLYCOL 3350 17 G: 17 POWDER, FOR SOLUTION ORAL at 11:01

## 2024-01-01 RX ADMIN — HYDRALAZINE HYDROCHLORIDE 100 MG: 50 TABLET ORAL at 05:01

## 2024-01-01 RX ADMIN — IOHEXOL 1000 ML: 9 SOLUTION ORAL at 07:01

## 2024-01-01 RX ADMIN — INSULIN DETEMIR 10 UNITS: 100 INJECTION, SOLUTION SUBCUTANEOUS at 08:01

## 2024-01-01 RX ADMIN — INSULIN ASPART 2 UNITS: 100 INJECTION, SOLUTION INTRAVENOUS; SUBCUTANEOUS at 01:01

## 2024-01-01 RX ADMIN — HEPARIN SODIUM 3600 UNITS: 1000 INJECTION, SOLUTION INTRAVENOUS; SUBCUTANEOUS at 06:01

## 2024-01-01 RX ADMIN — DOXYCYCLINE HYCLATE 100 MG: 100 TABLET, COATED ORAL at 08:01

## 2024-01-09 NOTE — PROVIDER TRANSFER
" (Physician in Lead of Transfers)   Outside Transfer Acceptance Note / Regional Referral Center    Referring facility: [facility]   Referring provider: CHERRI BHATTI  Accepting facility: [facility]  Accepting provider: KAYLYN BLANKENSHIP CLINTON J.  Reason for transfer: Higher Level of Care Higher level of care  Transfer diagnosis: acute renal failure  Transfer specialty requested: Nephrology  Transfer specialty notified: Yes  Transfer level: 2  Isolation status: No active isolations   Admission class or status: IP- Inpatient      Narrative     Patient is a 53 y.o. male who has a past medical history of Diabetes mellitus, Hypertension, and Stroke presented with shortness of breath secondary to volume overload and CHF. Chest xray with pulmonary edema. Labs show BUN/Cr 73/4.7. Facility seeking transfer for nephrology consult. Stable for transfer.     Objective     Vitals:      Recent Labs: see EPIC  CBC:  No results for input(s): "WBC", "HGB", "HCT", "PLT" in the last 24 hours.  CMP:  No results for input(s): "GLUCOSE", "CALCIUM", "ALBUMIN", "PROT", "NA", "K", "CO2", "CL", "BUN", "CREATININE", "ALKPHOS", "ALT", "AST", "BILITOT" in the last 24 hours.  No results for input(s): "LACTATE" in the last 72 hours.  No results for input(s): "CPK", "CPKMB", "TROPONINI", "MB" in the last 168 hours.  BNP  No results for input(s): "BNP", "BNPTRIAGEBLO" in the last 168 hours.  ABG  No results for input(s): "PH", "PO2", "PCO2", "HCO3", "BE" in the last 168 hours.   No results found for: "INR", "PROTIME"    Recent imaging:   Posterior Segment OCT Retina-Both eyes  Right Eye  Quality was good. Findings include epiretinal membrane.     Left Eye  Quality was good.     Notes  OU- Extensive scarring       Airway:     Vent settings:         IV access:    Allergies: Review of patient's allergies indicates:  No Known Allergies   NPO: No    Anticoagulation:   Anticoagulants       None         "     Instructions      [facility]  Admit to Hospital Medicine  Upon patient arrival to floor, please contact Hospital Medicine on call.

## 2024-01-10 PROBLEM — N13.8 BENIGN PROSTATIC HYPERPLASIA WITH URINARY OBSTRUCTION: Status: ACTIVE | Noted: 2024-01-01

## 2024-01-10 PROBLEM — I50.9 CHF (CONGESTIVE HEART FAILURE): Status: ACTIVE | Noted: 2024-01-01

## 2024-01-10 PROBLEM — N17.9 ACUTE KIDNEY INJURY SUPERIMPOSED ON CKD: Status: ACTIVE | Noted: 2024-01-01

## 2024-01-10 PROBLEM — N40.1 BENIGN PROSTATIC HYPERPLASIA WITH URINARY OBSTRUCTION: Status: ACTIVE | Noted: 2024-01-01

## 2024-01-10 PROBLEM — N18.9 ACUTE KIDNEY INJURY SUPERIMPOSED ON CKD: Status: ACTIVE | Noted: 2024-01-01

## 2024-01-10 NOTE — SUBJECTIVE & OBJECTIVE
Past Medical History:   Diagnosis Date    Diabetes mellitus     Hypertension     Stroke 2017/        Past Surgical History:   Procedure Laterality Date    LEG SURGERY      TOE AMPUTATION         Review of patient's allergies indicates:   Allergen Reactions    Ceftriaxone Itching       No current facility-administered medications on file prior to encounter.     Current Outpatient Medications on File Prior to Encounter   Medication Sig    amLODIPine (NORVASC) 10 MG tablet Take 10 mg by mouth once daily.    atorvastatin (LIPITOR) 40 MG tablet Take 40 mg by mouth nightly.    carvediloL (COREG) 25 MG tablet Take 25 mg by mouth 2 (two) times daily.    cephALEXin (KEFLEX) 500 MG capsule Take 500 mg by mouth 4 (four) times daily.    cholecalciferol, vitamin D3, 1,250 mcg (50,000 unit) capsule Take 50,000 Units by mouth every 7 days.    clopidogreL (PLAVIX) 75 mg tablet Take 75 mg by mouth once daily.    DULoxetine (CYMBALTA) 60 MG capsule Take 60 mg by mouth once daily.    folic acid (FOLVITE) 1 MG tablet Take 1,000 mcg by mouth once daily.    furosemide (LASIX) 80 MG tablet Take 80 mg by mouth 2 (two) times daily.    gabapentin (NEURONTIN) 300 MG capsule Take 300 mg by mouth 3 (three) times daily.    glipiZIDE (GLUCOTROL) 5 MG tablet Take 5 mg by mouth.    hydrALAZINE (APRESOLINE) 50 MG tablet Take 50 mg by mouth 3 (three) times daily.    morphine (MSIR) 30 MG tablet TAKE ONE TABLET 2-3 TIMES A DAY AS NEEDED FOR PAIN    MOVANTIK 25 mg tablet TAKE ONE TABLET BY MOUTH EVERY MORNING ON EMPTY STOMACH FOR CONSTIPATION    potassium chloride (KLOR-CON) 10 MEQ TbSR Take 10 mEq by mouth once daily.    promethazine (PHENERGAN) 12.5 MG Tab Take 12.5 mg by mouth 3 (three) times daily as needed.    tamsulosin (FLOMAX) 0.4 mg Cap Take 1 capsule by mouth once daily.    traZODone (DESYREL) 150 MG tablet Take 150-300 mg by mouth nightly as needed.    TRESIBA FLEXTOUCH U-200 200 unit/mL (3 mL) insulin pen SMARTSI Unit(s) SUB-Q Daily  "   TRULICITY 1.5 mg/0.5 mL pen injector SMARTSI Pre-Filled Pen Syringe SUB-Q Once a Week    ULTICARE PEN NEEDLE 31 gauge x 3/16" Ndle USE 3 TO 4 TIMES A DAY     Family History       Problem Relation (Age of Onset)    Cancer Mother    Diabetes Brother    Hypertension Mother, Father          Tobacco Use    Smoking status: Never    Smokeless tobacco: Never   Substance and Sexual Activity    Alcohol use: Not Currently    Drug use: Not on file    Sexual activity: Not on file     Review of Systems   All other systems reviewed and are negative.    Objective:     Vital Signs (Most Recent):    Vital Signs (24h Range):  Temp:  [98.2 °F (36.8 °C)] 98.2 °F (36.8 °C)  Pulse:  [85-92] 92  Resp:  [18-22] 18  SpO2:  [95 %-96 %] 95 %  BP: (174-180)/(73-75) 180/75        There is no height or weight on file to calculate BMI.     Physical Exam  Vitals and nursing note reviewed.   Constitutional:       General: He is not in acute distress.     Appearance: Normal appearance. He is normal weight. He is ill-appearing.      Comments: Wearing 2 L NC   HENT:      Head: Normocephalic and atraumatic.      Nose: Nose normal.      Mouth/Throat:      Pharynx: Oropharynx is clear.   Eyes:      Extraocular Movements: Extraocular movements intact.      Pupils: Pupils are equal, round, and reactive to light.   Cardiovascular:      Rate and Rhythm: Tachycardia present.      Pulses: Normal pulses.      Heart sounds: Normal heart sounds.   Pulmonary:      Effort: Pulmonary effort is normal. No respiratory distress.      Breath sounds: Rales present. No wheezing or rhonchi.      Comments: Decreased BS bilaterally  Abdominal:      General: Abdomen is flat. Bowel sounds are normal. There is distension.      Palpations: Abdomen is soft.      Tenderness: There is no abdominal tenderness. There is no guarding.   Genitourinary:     Comments: Valera in place  Musculoskeletal:      Comments: Right forearm AV shunt   Neurological:      General: No focal deficit " present.      Mental Status: He is alert and oriented to person, place, and time.   Psychiatric:         Mood and Affect: Mood normal.         Behavior: Behavior normal.              CRANIAL NERVES     CN III, IV, VI   Pupils are equal, round, and reactive to light.       Significant Labs: All pertinent labs within the past 24 hours have been reviewed.  Recent Lab Results         01/10/24  1626        POCT Glucose 405               Significant Imaging: I have reviewed all pertinent imaging results/findings within the past 24 hours.    X-Ray Chest AP Portable    (Results Pending)   X-Ray Abdomen AP 1 View    (Results Pending)

## 2024-01-10 NOTE — HPI
"Moody Silverio is 52 y/o male with PMHx IDDM, HTN, CKD, Hx of CVA with right hemiparesis, anemia of chronic disease, BPH, chronic back pain, anxiety who presented to [facility] on 1-8-24 with c/o SOB due to volume overload/CHF exacerbation and pneumonia and also acute on CKD. History obtained from records sent from outside hospital, patient, and family at bedside. Patient remained at outside hospital ER till transfer to [facility] today (1-10-24). Patient arrived to [facility] with Valera in place and on supplemental O2. Patient with right forearm AV shunt. Patient reports continued SOB, constipation, abdominal distention, fatigue.    Outside hospital ER labs/imaging reviewed:  WBC 11  BUN/Cr 77/4.8 (1/8/24); Cr 4.55    H/H 8.6/27.4  CXR with bilateral upper and lower lobe patchy hazy pulmonary opacities either edema or pneumonia (1/9/24)  CT Chest "bilateral pneumonia with an underlying component of volume overload." (1/8/24)    ER course reviewed: patient was initially placed on BiPAP, transitioned to NC (2-3 L), and also required Valera cathter placement due to urinary outlet obstruction. He was treated with pneumonia and CHF exacerbation, received ceftriaxone, azithromycin, doxycycline, DuoNebs, Bumex IV, Lasix IV. Also noted he developed reaction to ceftriaxone, listed as allergy. [facility] seeking transfer for nephrology consult, accepted as inpatient to [facility] on 1-8-24.  "

## 2024-01-11 NOTE — PROGRESS NOTES
Department of Anesthesiology  Preprocedure Note       Name:  Emily Romo   Age:  69 y.o.  :  1953                                          MRN:  [id number]         Date:  2021      Surgeon: Yanira Echavarria):  Troy Bran MD    Procedure: 25 GAUGE PARS PLANA VITRECTOMY WITH MEMBRANE PEEL AND INTERNAL LIMITING MEMBRANE PEEL/ FLUID AIR EXCHANGE - RIGHT EYE (Right )    Medications prior to admission:   Prior to Admission medications    Medication Sig Start Date End Date Taking? Authorizing Provider   hydrochlorothiazide (MICROZIDE) 12.5 MG capsule Take 12.5 mg by mouth every morning    Historical Provider, MD   atorvastatin (LIPITOR) 10 MG tablet Take 10 mg by mouth daily    Historical Provider, MD   Multiple Vitamins-Minerals (MULTIVITAMIN WOMEN 50+) TABS Take 1 tablet by mouth daily    Historical Provider, MD       Current medications:    Current Outpatient Medications   Medication Sig Dispense Refill    hydrochlorothiazide (MICROZIDE) 12.5 MG capsule Take 12.5 mg by mouth every morning      atorvastatin (LIPITOR) 10 MG tablet Take 10 mg by mouth daily      Multiple Vitamins-Minerals (MULTIVITAMIN WOMEN 50+) TABS Take 1 tablet by mouth daily       No current facility-administered medications for this visit.        Allergies:  No Known Allergies    Problem List:    Patient Active Problem List   Diagnosis Code    Epiretinal membrane, left eye H35.372       Past Medical History:        Diagnosis Date    Hyperlipidemia     Hypertension        Past Surgical History:        Procedure Laterality Date    CATARACT REMOVAL WITH IMPLANT Left 2018    Dr. Collado See    COLONOSCOPY      2018    EYE SURGERY Left     detached retina    EYE SURGERY Left     cataract    VITRECTOMY Left 2019    25 GAUGE PARS PLANA VITRECTOMY WITH MEMBRANE PEEL AND INTERNAL LIMITING MEMBRANE PEEL; FLUID AIR EXCHANGE AND SUBTENONS KENALOG performed by Troy Bran MD at [address] [place] [address]       Social History: Reviewed CT findings  Will continue doxycycline  Received 1 dose at outside hospital  Social History     Tobacco Use    Smoking status: Never Smoker    Smokeless tobacco: Never Used   Substance Use Topics    Alcohol use: Yes     Comment: rare                                 Counseling given: Not Answered      Vital Signs (Current): There were no vitals filed for this visit. BP Readings from Last 3 Encounters:   05/20/19 (!) 155/72   05/20/19 (!) 175/80   08/31/18 119/81       NPO Status:                                                                                 BMI:   Wt Readings from Last 3 Encounters:   05/20/19 135 lb (61.2 kg)   08/17/18 135 lb (61.2 kg)   08/17/18 135 lb (61.2 kg)     There is no height or weight on file to calculate BMI.    CBC: No results found for: WBC, RBC, HGB, HCT, MCV, RDW, PLT    CMP: No results found for: NA, K, CL, CO2, BUN, CREATININE, GFRAA, AGRATIO, LABGLOM, GLUCOSE, PROT, CALCIUM, BILITOT, ALKPHOS, AST, ALT    POC Tests: No results for input(s): POCGLU, POCNA, POCK, POCCL, POCBUN, POCHEMO, POCHCT in the last 72 hours. Coags: No results found for: PROTIME, INR, APTT    HCG (If Applicable): No results found for: PREGTESTUR, PREGSERUM, HCG, HCGQUANT     ABGs: No results found for: PHART, PO2ART, UJQ6NUS, ECX5KWS, BEART, D8MNYDUK     Type & Screen (If Applicable):  No results found for: LABABO, [address]    Anesthesia Evaluation  Patient summary reviewed no history of anesthetic complications:   Airway: Mallampati: II  TM distance: <3 FB   Neck ROM: limited  Mouth opening: > = 3 FB Dental:          Pulmonary:Negative Pulmonary ROS breath sounds clear to auscultation      (-) COPD, asthma, shortness of breath, sleep apnea and not a current smoker          Patient did not smoke on day of surgery.                  Cardiovascular:    (+) hypertension:, hyperlipidemia    (-) pacemaker, past MI, CAD, CABG/stent, dysrhythmias,  angina and  CHF    ECG reviewed  Rhythm: regular  Rate: normal           Beta Blocker:  Not on Beta Blocker         Neuro/Psych:   Negative Neuro/Psych ROS     (-) seizures, TIA and CVA           GI/Hepatic/Renal:        (-) GERD, liver disease and no renal disease       Endo/Other:    (+) no malignancy/cancer. (-) diabetes mellitus, hypothyroidism, hyperthyroidism, no malignancy/cancer               Abdominal:             Vascular: negative vascular ROS. Other Findings:               Anesthesia Plan      MAC     ASA 2     (Received 2mg midazolam + 100mcg fentanyl for left eye)  Induction: intravenous. Anesthetic plan and risks discussed with patient. Plan discussed with CRNA. This pre-anesthesia assessment may be used as a history and physical.    DOS STAFF ADDENDUM:    Pt seen and examined, chart reviewed (including anesthesia, drug and allergy history). No interval changes to history and physical examination. Anesthetic plan, risks, benefits, alternatives, and personnel involved discussed with patient. Patient verbalized an understanding and agrees to proceed.       Leonard Mcardle, MD  September 19, 2021  9:18 PM

## 2024-01-11 NOTE — ASSESSMENT & PLAN NOTE
Patient with acute kidney injury/acute renal failure likely due to post-obstructive d/t bladder outlet obstruction  ELMIRA is currently worsening. Baseline creatinine unknown - Labs reviewed- Renal function/electrolytes with Estimated Creatinine Clearance: 24.7 mL/min (A) (based on SCr of 4.7 mg/dL (H)). according to latest data. Monitor urine output and serial BMP and adjust therapy as needed. Avoid nephrotoxins and renally dose meds for GFR listed above.    Hold further IV diuretics for now  Consult Nephrology  Strict I/O's, avoid nephrotoxins  May require HD, has right AVF since Sept '23.    1/11:   Nephrology on case  Okay to resume diuresing   Continue to monitor renal functions

## 2024-01-11 NOTE — ASSESSMENT & PLAN NOTE
Valera in place on arrival, placed at [facility]  CT C/A/P pending  Consider Urology consult pending CT results  Strict I/O's

## 2024-01-11 NOTE — SUBJECTIVE & OBJECTIVE
Past Medical History:   Diagnosis Date    Diabetes mellitus     Hypertension     Stroke 2017/        Past Surgical History:   Procedure Laterality Date    LEG SURGERY      TOE AMPUTATION         Review of patient's allergies indicates:   Allergen Reactions    Ceftriaxone Itching       No current facility-administered medications on file prior to encounter.     Current Outpatient Medications on File Prior to Encounter   Medication Sig    amLODIPine (NORVASC) 10 MG tablet Take 10 mg by mouth once daily.    atorvastatin (LIPITOR) 40 MG tablet Take 40 mg by mouth nightly.    bumetanide (BUMEX) 2 MG tablet Take 2 mg by mouth 2 (two) times a day.    carvediloL (COREG) 25 MG tablet Take 25 mg by mouth 2 (two) times daily.    cholecalciferol, vitamin D3, 1,250 mcg (50,000 unit) capsule Take 50,000 Units by mouth every 7 days.    clopidogreL (PLAVIX) 75 mg tablet Take 75 mg by mouth once daily.    gabapentin (NEURONTIN) 300 MG capsule Take 300 mg by mouth 3 (three) times daily.    hydrALAZINE (APRESOLINE) 50 MG tablet Take 100 mg by mouth 3 (three) times daily.    magnesium oxide (MAG-OX) 400 mg (241.3 mg magnesium) tablet Take 400 mg by mouth 2 (two) times daily.    sertraline (ZOLOFT) 100 MG tablet Take 100 mg by mouth once daily.    tamsulosin (FLOMAX) 0.4 mg Cap Take 1 capsule by mouth once daily.    traZODone (DESYREL) 150 MG tablet Take 150-300 mg by mouth nightly as needed.    TRULICITY 1.5 mg/0.5 mL pen injector SMARTSI Pre-Filled Pen Syringe SUB-Q Once a Week    cephALEXin (KEFLEX) 500 MG capsule Take 500 mg by mouth 4 (four) times daily.    ferrous sulfate 325 (65 FE) MG EC tablet Take 325 mg by mouth once daily.    folic acid (FOLVITE) 1 MG tablet Take 1,000 mcg by mouth once daily.    furosemide (LASIX) 80 MG tablet Take 80 mg by mouth 2 (two) times daily.    glipiZIDE (GLUCOTROL) 5 MG tablet Take 5 mg by mouth.    MOVANTIK 25 mg tablet TAKE ONE TABLET BY MOUTH EVERY MORNING ON EMPTY STOMACH FOR  "CONSTIPATION    pantoprazole (PROTONIX) 40 MG tablet Take 40 mg by mouth once daily.    potassium chloride (KLOR-CON) 10 MEQ TbSR Take 10 mEq by mouth once daily.    promethazine (PHENERGAN) 12.5 MG Tab Take 12.5 mg by mouth 3 (three) times daily as needed.    TRESIBA FLEXTOUCH U-200 200 unit/mL (3 mL) insulin pen Inject 25 Units into the skin once daily.    ULTICARE PEN NEEDLE 31 gauge x 3/16" Ndle USE 3 TO 4 TIMES A DAY     Family History       Problem Relation (Age of Onset)    Cancer Mother    Diabetes Brother    Hypertension Mother, Father          Tobacco Use    Smoking status: Never    Smokeless tobacco: Never   Substance and Sexual Activity    Alcohol use: Not Currently    Drug use: Not on file    Sexual activity: Not on file     Review of Systems   Constitutional: Positive for malaise/fatigue.   HENT: Negative.     Eyes: Negative.    Cardiovascular:  Positive for dyspnea on exertion and leg swelling.   Respiratory:  Positive for cough and shortness of breath.    Endocrine: Negative.    Hematologic/Lymphatic: Negative.    Musculoskeletal: Negative.    Gastrointestinal:  Positive for bloating.   Genitourinary: Negative.    Neurological: Negative.    Psychiatric/Behavioral: Negative.     Allergic/Immunologic: Negative.      Objective:     Vital Signs (Most Recent):  Temp: 98.3 °F (36.8 °C) (01/11/24 0759)  Pulse: 78 (01/11/24 0759)  Resp: 18 (01/11/24 0759)  BP: (!) 176/79 (01/11/24 0759)  SpO2: (!) 92 % (01/11/24 0759) Vital Signs (24h Range):  Temp:  [97.2 °F (36.2 °C)-98.5 °F (36.9 °C)] 98.3 °F (36.8 °C)  Pulse:  [76-92] 78  Resp:  [15-18] 18  SpO2:  [88 %-97 %] 92 %  BP: (172-185)/(75-86) 176/79     Weight: 126.3 kg (278 lb 7.1 oz)  Body mass index is 36.74 kg/m².    SpO2: (!) 92 %       No intake or output data in the 24 hours ending 01/11/24 0857    Lines/Drains/Airways       Peripheral Intravenous Line  Duration                  Hemodialysis AV Fistula Right forearm -- days                     Physical " "Exam  Vitals and nursing note reviewed.   Constitutional:       General: He is not in acute distress.     Appearance: Normal appearance. He is well-developed. He is ill-appearing. He is not diaphoretic.      Comments: +conversational dyspnea     HENT:      Head: Normocephalic and atraumatic.   Eyes:      General:         Right eye: No discharge.         Left eye: No discharge.      Pupils: Pupils are equal, round, and reactive to light.   Cardiovascular:      Rate and Rhythm: Regular rhythm. Bradycardia present.      Heart sounds: Normal heart sounds, S1 normal and S2 normal. No murmur heard.  Pulmonary:      Effort: Pulmonary effort is normal. No respiratory distress.      Breath sounds: Normal breath sounds.      Comments: Diminished BS at bases  Abdominal:      General: There is distension.   Musculoskeletal:      Right lower leg: Edema present.      Left lower leg: Edema present.   Skin:     General: Skin is warm and dry.      Findings: No erythema.   Neurological:      Mental Status: He is alert and oriented to person, place, and time.   Psychiatric:         Mood and Affect: Mood normal.         Behavior: Behavior normal.         Thought Content: Thought content normal.          Significant Labs: CMP   Recent Labs   Lab 01/10/24  1631 01/11/24  0551   * 135*   K 3.9 3.9    105   CO2 14* 14*   * 222*   BUN 92* 97*   CREATININE 4.8* 4.7*   CALCIUM 7.6* 7.6*   PROT 7.5  --    ALBUMIN 2.5*  --    BILITOT 0.5  --    ALKPHOS 66  --    AST 13  --    ALT 14  --    ANIONGAP 16 16   , CBC   Recent Labs   Lab 01/10/24  1631 01/11/24  0551   WBC 8.00 9.68   HGB 8.8* 8.2*   HCT 27.2* 25.4*    375   , Troponin No results for input(s): "TROPONINI" in the last 48 hours., and All pertinent lab results from the last 24 hours have been reviewed.    Significant Imaging: Echocardiogram: Transthoracic echo (TTE) complete (Cupid Only): No results found for this or any previous visit., EKG: Reviewed, and " X-Ray: CXR: X-Ray Chest 1 View (CXR): No results found for this visit on 01/10/24. and X-Ray Chest PA and Lateral (CXR): No results found for this visit on 01/10/24.

## 2024-01-11 NOTE — ASSESSMENT & PLAN NOTE
Patient's FSGs are uncontrolled due to hyperglycemia on current medication regimen.  Last A1c reviewed-   Lab Results   Component Value Date    HGBA1C 7.4 (H) 06/24/2023     Most recent fingerstick glucose reviewed-   Recent Labs   Lab 01/10/24  1626   POCTGLUCOSE 405*     Current correctional scale  Medium  Maintain anti-hyperglycemic dose as follows-   Antihyperglycemics (From admission, onward)      Start     Stop Route Frequency Ordered    01/10/24 1730  insulin detemir U-100 (Levemir) pen 10 Units         -- SubQ Daily 01/10/24 1629    01/10/24 1728  insulin aspart U-100 pen 0-10 Units         -- SubQ Before meals & nightly PRN 01/10/24 1629          Hold Oral hypoglycemics while patient is in the hospital.

## 2024-01-11 NOTE — ASSESSMENT & PLAN NOTE
Patient is identified as having  unknown, TTE pending  heart failure that is Acute. CHF is currently uncontrolled due to Rales/crackles on pulmonary exam and Pulmonary edema/pleural effusion on CXR. Latest ECHO performed and demonstrates- No results found for this or any previous visit.  .  Monitor strict Is&Os and daily weights.    Place on fluid restriction of 1.5 L.   Last BNP reviewed- and noted below   Recent Labs   Lab 01/10/24  1631   *     TTE pending  Consult Cardiology

## 2024-01-11 NOTE — ASSESSMENT & PLAN NOTE
Patient's anemia is currently controlled. Has not received any PRBCs to date. Etiology likely d/t chronic disease due to Chronic Kidney Disease/ESRD  Current CBC reviewed-   Lab Results   Component Value Date    HGB 8.8 (L) 01/10/2024    HCT 27.2 (L) 01/10/2024     Monitor serial CBC and transfuse if patient becomes hemodynamically unstable, symptomatic or H/H drops below 7/21.

## 2024-01-11 NOTE — ASSESSMENT & PLAN NOTE
Chronic, controlled. Latest blood pressure and vitals reviewed-     Temp:  [98.2 °F (36.8 °C)]   Pulse:  [85-92]   Resp:  [18-22]   BP: (174-180)/(73-75)   SpO2:  [95 %-96 %] .   Home meds for hypertension were reviewed and noted below.   Hypertension Medications               amLODIPine (NORVASC) 10 MG tablet Take 10 mg by mouth once daily.    carvediloL (COREG) 25 MG tablet Take 25 mg by mouth 2 (two) times daily.    furosemide (LASIX) 80 MG tablet Take 80 mg by mouth 2 (two) times daily.    hydrALAZINE (APRESOLINE) 50 MG tablet Take 50 mg by mouth 3 (three) times daily.            While in the hospital, will manage blood pressure as follows; Continue home antihypertensive regimen    Will utilize p.r.n. blood pressure medication only if patient's blood pressure greater than 180/110 and he develops symptoms such as worsening chest pain or shortness of breath.

## 2024-01-11 NOTE — ASSESSMENT & PLAN NOTE
-Presents with decompensated CHF  -BNP improving but CT of chest/abdomen/pelvis still with small effusions and bilateral opacities (infectious vs non-infectious inflammation) and still appears overloaded  -Await nephrology rec's  -Continue BB, hydralazine as tolerated  -Strict I's/O's  -TTE pending

## 2024-01-11 NOTE — PLAN OF CARE
[facility] - Telemetry (Hospital)  Initial Discharge Assessment       Primary Care Provider: Jersey Nolan MD    Admission Diagnosis: Acute on chronic renal failure [N17.9, N18.9]    Admission Date: 1/10/2024  Expected Discharge Date:     Transition of Care Barriers: None    Payor: [employer] / Plan: [employer] MEDICARE HMO / Product Type: Medicare Advantage /     Extended Emergency Contact Information  Primary Emergency Contact: marisela sauer  Mobile Phone: [phone]  Relation: Sister  Preferred language: English   needed? No    Discharge Plan A: Home Health  Discharge Plan B: Home with [facility]      Medical Pharmacy [address] [address]  Phone: [phone] Fax: [phone]      Initial Assessment (most recent)       Adult Discharge Assessment - 01/11/24 1148          Discharge Assessment    Assessment Type Discharge Planning Assessment     Confirmed/corrected address, phone number and insurance Yes     Confirmed Demographics Correct on Facesheet     Source of Information family     When was your last doctors appointment? 11/02/23   Fabricio Ramirez MD - Nephrology    Communicated ALONDRA with patient/caregiver Date not available/Unable to determine     Reason For Admission Acute kidney injury superimposed on CKD     People in Home alone     Facility Arrived From: [facility]     Do you expect to return to your current living situation? Yes     Do you have help at home or someone to help you manage your care at home? --   Patient's daughter to arrange care at home.    Prior to hospitilization cognitive status: Alert/Oriented     Current cognitive status: Unable to Assess     Walking or Climbing Stairs Difficulty yes     Walking or Climbing Stairs ambulation difficulty, requires equipment     Mobility Management cane, rolling walker, wheelchair     Dressing/Bathing Difficulty yes     Dressing/Bathing bathing difficulty, requires  equipment     Dressing/Bathing Management shower chair     Home Accessibility stairs to enter home     Number of Stairs, Main Entrance five     Surface of Stairs, Main Entrance concrete     Stair Railings, Main Entrance railing on right side (ascending)     Equipment Currently Used at Home rollator;wheelchair;cane, straight;bedside commode;shower chair     Readmission within 30 days? No     Patient currently being followed by outpatient case management? No     Do you currently have service(s) that help you manage your care at home? Yes     Name and Contact number of agency [facility]     Is the pt/caregiver preference to resume services with current agency Yes     Do you take prescription medications? Yes     Do you have prescription coverage? Yes     Coverage [employer] Medicare     Do you have any problems affording any of your prescribed medications? No     Is the patient taking medications as prescribed? yes     Who is going to help you get home at discharge? Patient's daughter to arrange care upon DC.     How do you get to doctors appointments? family or friend will provide;health plan transportation     Are you on dialysis? No     Do you take coumadin? No     Discharge Plan A Home Health     Discharge Plan B Home with family     DME Needed Upon Discharge  none     Discharge Plan discussed with: Sibling;Adult children     Name(s) and Number(s) Reba Tafoya - sister and Peggy Weeksbanks - daughter     Transition of Care Barriers None                   Anticipated DC dispo: Resume [facility]   Prior Level of Function: independent with ADLs, bathing aid 2-3x a week  People in home: alone    Comments:  RICKY spoke to patient's sister, Reba, over the phone to introduce role and discuss discharge planning. Patient's sister states Patient's daughters will arrange for help at home and can provide transport at time of discharge. Confirmed demographics, insurance, and emergency contacts. Patient has  cane, rollator, wheelchair, BSC, and shower chair at home for use. Patient is current with [employer] and has a bathing aid 2-3x a week coming to help.Patient's family is transportation to doctors appointments or uses Lyft provided by insurance. SW updated Patient's whiteboard with contact information and anticipated DC plan. CM discharge needs depends on hospital progress. SW will continue following to assist with other needs.

## 2024-01-11 NOTE — ASSESSMENT & PLAN NOTE
-Continue Coreg as BP permits  -Continue hydralazine, amlodipine  -Monitor BP trend  -Mgmt as per primary team

## 2024-01-11 NOTE — PLAN OF CARE
Problem: Adult Inpatient Plan of Care  Goal: Plan of Care Review  Outcome: Ongoing, Progressing  Goal: Patient-Specific Goal (Individualized)  Outcome: Ongoing, Progressing  Goal: Absence of Hospital-Acquired Illness or Injury  Outcome: Ongoing, Progressing  Goal: Optimal Comfort and Wellbeing  Outcome: Ongoing, Progressing  Goal: Readiness for Transition of Care  Outcome: Ongoing, Progressing     Problem: Diabetes Comorbidity  Goal: Blood Glucose Level Within Targeted Range  Outcome: Ongoing, Progressing     Problem: Fluid and Electrolyte Imbalance (Acute Kidney Injury/Impairment)  Goal: Fluid and Electrolyte Balance  Outcome: Ongoing, Progressing     Problem: Oral Intake Inadequate (Acute Kidney Injury/Impairment)  Goal: Optimal Nutrition Intake  Outcome: Ongoing, Progressing     Problem: Renal Function Impairment (Acute Kidney Injury/Impairment)  Goal: Effective Renal Function  Outcome: Ongoing, Progressing     Problem: Skin Injury Risk Increased  Goal: Skin Health and Integrity  Outcome: Ongoing, Progressing     Problem: Impaired Wound Healing  Goal: Optimal Wound Healing  Outcome: Ongoing, Progressing     Pt oriented x4.  VSS.  Pt remained afebrile throughout this shift.   All meds administered per order.   Pt remained free of falls this shift.   Plan of care reviewed. Patient verbalizes understanding.   Pt moving/turning independently.   Patient normal sinus on  the telemetry monitor.   Bed low, side rails up x 2, wheels locked, call light in reach. Family bedside.  Pt on 12 liters high flow nasal canula.   Patient instructed to call for assistance.

## 2024-01-11 NOTE — HOSPITAL COURSE
1/11:  Nephrology and cardiology on case.  Recommend Bumex 2 mg q.8h.  Will continue p.o. doxycycline.  Wean O2 as tolerated.  Echo showed normal EF.  Likely diastolic heart failure exacerbation.  1/12:  Patient requiring high-flow Vapotherm today, will repeat stat chest x-ray.  Continue with diuresis.  Anemia noted, likely to CKD.  Will check iron studies.  Consider EPO and iron replacement.  1/13:  Patient currently on 30 L 60% FiO2.  Continue with diuresis.  Start IV Levaquin for pneumonia.  Anemia again noted, will transfuse 1 unit packed RBCs.  Likely secondary to CKD and iron deficiency.  Start iron replacement therapy.  EPO given.  1/14:  Patient on 30 L 85% FiO2.  Continue diuresis.  Continue Levaquin.  H&H improved status post 1 unit packed RBC.  Continue iron replacement therapy.  1/15: weaned down to 30L 60% fio2. Cont levaquin for pna. H/h trending down, no active signs of bleeding noted. Cont to monitor. Cont on iv bumex and metolazone for diuresis. Pt/OT eval.   1/16:.  Patient required BiPAP overnight however only wore it for a few hours.  Currently on 36 L 90% Vapotherm.  Pulmonology on case.  Continue diuresis.  Nephrology on case.  Repeat chest x-ray today.  Continue Levaquin.  PT/OT recommending SNF placement.  1/17:.  Patient continues to be volume overloaded.  Creatinine trending up.  Nephrology to start dialysis.  Patient with right AV fistula in forearm.  Continue empiric treatment for pneumonia.  1/18:  Dialysis attempted yesterday however right AV fistula did not function properly.  Likely requires more time for maturation.  Family discussion held as patient voiced not wanting to initiate dialysis and just wanting to go home.  Discussion was held with myself, nephrologist, patient, and 2 daughters.  Discussed that patient may likely improve with trial of dialysis and also with the expected course will be should he decide to forego dialysis.  Hospice was also discussed.  Patient agreeable to  initiate dialysis.  IR consulted for Vas-Cath placement.  1/19: Patient with respiratory distress this a.m. maxed out on Vapotherm.  He was placed on BiPAP.  Nephrology on case who plans to dialyze again today.  Extra 100 mg of Lasix given.

## 2024-01-11 NOTE — SUBJECTIVE & OBJECTIVE
Interval History:  Patient denies any issues overnight.  States that his shortness of breath appears to be improving.  Does not normally wear oxygen at home.    Review of Systems   Constitutional:  Negative for fatigue and fever.   HENT:  Negative for sinus pressure.    Eyes:  Negative for visual disturbance.   Respiratory:  Positive for shortness of breath.    Cardiovascular:  Positive for leg swelling. Negative for chest pain.   Gastrointestinal:  Negative for nausea and vomiting.   Genitourinary:  Negative for difficulty urinating.   Musculoskeletal:  Negative for back pain.   Skin:  Negative for rash.   Neurological:  Negative for headaches.   Psychiatric/Behavioral:  Negative for confusion.      Objective:     Vital Signs (Most Recent):  Temp: 98.3 °F (36.8 °C) (01/11/24 0759)  Pulse: 75 (01/11/24 1159)  Resp: 18 (01/11/24 0759)  BP: (!) 176/79 (01/11/24 0935)  SpO2: (!) 92 % (01/11/24 0759) Vital Signs (24h Range):  Temp:  [97.2 °F (36.2 °C)-98.5 °F (36.9 °C)] 98.3 °F (36.8 °C)  Pulse:  [50-91] 75  Resp:  [15-18] 18  SpO2:  [88 %-97 %] 92 %  BP: (172-185)/(79-86) 176/79     Weight: 126.1 kg (278 lb)  Body mass index is 36.68 kg/m².    Intake/Output Summary (Last 24 hours) at 1/11/2024 1241  Last data filed at 1/11/2024 0820  Gross per 24 hour   Intake --   Output 300 ml   Net -300 ml         Physical Exam  Constitutional:       General: He is not in acute distress.     Appearance: He is well-developed. He is obese. He is not diaphoretic.   HENT:      Head: Normocephalic and atraumatic.   Eyes:      Pupils: Pupils are equal, round, and reactive to light.   Cardiovascular:      Rate and Rhythm: Normal rate and regular rhythm.      Heart sounds: Normal heart sounds. No murmur heard.     No friction rub. No gallop.   Pulmonary:      Effort: Pulmonary effort is normal. No respiratory distress.      Breath sounds: No stridor. Rales present. No wheezing.   Abdominal:      General: Bowel sounds are normal. There is no  distension.      Palpations: Abdomen is soft. There is no mass.      Tenderness: There is no abdominal tenderness. There is no guarding.   Musculoskeletal:      Right lower leg: Edema present.      Left lower leg: Edema present.   Skin:     General: Skin is warm.      Findings: No erythema.   Neurological:      Mental Status: He is alert and oriented to person, place, and time.             Significant Labs: All pertinent labs within the past 24 hours have been reviewed.    Significant Imaging: I have reviewed all pertinent imaging results/findings within the past 24 hours.

## 2024-01-11 NOTE — PROGRESS NOTES
"[facility]  [facility]    Patient Name: Moody Silverio  MRN: [id number]  Patient Class: IP- Inpatient   Admission Date: 1/10/2024  Length of Stay: 1 days  Attending Physician: Abhijeet Kennedy MD  Primary Care Provider: Jersey Nolan MD        Subjective:     Principal Problem:Acute kidney injury superimposed on CKD        HPI:  Moody Silverio is 52 y/o male with PMHx IDDM, HTN, CKD, Hx of CVA with right hemiparesis, anemia of chronic disease, BPH, chronic back pain, anxiety who presented to [facility] on 1-8-24 with c/o SOB due to volume overload/CHF exacerbation and pneumonia and also acute on CKD. History obtained from records sent from outside hospital, patient, and family at bedside. Patient remained at outside hospital ER till transfer to [facility] today (1-10-24). Patient arrived to [facility] with Valera in place and on supplemental O2. Patient with right forearm AV shunt. Patient reports continued SOB, constipation, abdominal distention, fatigue.    Outside hospital ER labs/imaging reviewed:  WBC 11  BUN/Cr 77/4.8 (1/8/24); Cr 4.55    H/H 8.6/27.4  CXR with bilateral upper and lower lobe patchy hazy pulmonary opacities either edema or pneumonia (1/9/24)  CT Chest "bilateral pneumonia with an underlying component of volume overload." (1/8/24)    ER course reviewed: patient was initially placed on BiPAP, transitioned to NC (2-3 L), and also required Valera cathter placement due to urinary outlet obstruction. He was treated with pneumonia and CHF exacerbation, received ceftriaxone, azithromycin, doxycycline, DuoNebs, Bumex IV, Lasix IV. Also noted he developed reaction to ceftriaxone, listed as allergy. [facility] as [facility] to [facility] on 1-8-24.    Overview/Hospital Course:  1/11:  Nephrology and cardiology on case.  Recommend Bumex 2 mg q.8h.  Will continue p.o. doxycycline.  " Wean O2 as tolerated.  Echo showed normal EF.  Likely diastolic heart failure exacerbation.    Interval History:  Patient denies any issues overnight.  States that his shortness of breath appears to be improving.  Does not normally wear oxygen at home.    Review of Systems   Constitutional:  Negative for fatigue and fever.   HENT:  Negative for sinus pressure.    Eyes:  Negative for visual disturbance.   Respiratory:  Positive for shortness of breath.    Cardiovascular:  Positive for leg swelling. Negative for chest pain.   Gastrointestinal:  Negative for nausea and vomiting.   Genitourinary:  Negative for difficulty urinating.   Musculoskeletal:  Negative for back pain.   Skin:  Negative for rash.   Neurological:  Negative for headaches.   Psychiatric/Behavioral:  Negative for confusion.      Objective:     Vital Signs (Most Recent):  Temp: 98.3 °F (36.8 °C) (01/11/24 0759)  Pulse: 75 (01/11/24 1159)  Resp: 18 (01/11/24 0759)  BP: (!) 176/79 (01/11/24 0935)  SpO2: (!) 92 % (01/11/24 0759) Vital Signs (24h Range):  Temp:  [97.2 °F (36.2 °C)-98.5 °F (36.9 °C)] 98.3 °F (36.8 °C)  Pulse:  [50-91] 75  Resp:  [15-18] 18  SpO2:  [88 %-97 %] 92 %  BP: (172-185)/(79-86) 176/79     Weight: 126.1 kg (278 lb)  Body mass index is 36.68 kg/m².    Intake/Output Summary (Last 24 hours) at 1/11/2024 1241  Last data filed at 1/11/2024 0820  Gross per 24 hour   Intake --   Output 300 ml   Net -300 ml         Physical Exam  Constitutional:       General: He is not in acute distress.     Appearance: He is well-developed. He is obese. He is not diaphoretic.   HENT:      Head: Normocephalic and atraumatic.   Eyes:      Pupils: Pupils are equal, round, and reactive to light.   Cardiovascular:      Rate and Rhythm: Normal rate and regular rhythm.      Heart sounds: Normal heart sounds. No murmur heard.     No friction rub. No gallop.   Pulmonary:      Effort: Pulmonary effort is normal. No respiratory distress.      Breath sounds: No  stridor. Rales present. No wheezing.   Abdominal:      General: Bowel sounds are normal. There is no distension.      Palpations: Abdomen is soft. There is no mass.      Tenderness: There is no abdominal tenderness. There is no guarding.   Musculoskeletal:      Right lower leg: Edema present.      Left lower leg: Edema present.   Skin:     General: Skin is warm.      Findings: No erythema.   Neurological:      Mental Status: He is alert and oriented to person, place, and time.             Significant Labs: All pertinent labs within the past 24 hours have been reviewed.    Significant Imaging: I have reviewed all pertinent imaging results/findings within the past 24 hours.    Assessment/Plan:      * Acute kidney injury superimposed on CKD  Patient with acute kidney injury/acute renal failure likely due to post-obstructive d/t bladder outlet obstruction  ELMIRA is currently worsening. Baseline creatinine unknown - Labs reviewed- Renal function/electrolytes with Estimated Creatinine Clearance: 24.7 mL/min (A) (based on SCr of 4.7 mg/dL (H)). according to latest data. Monitor urine output and serial BMP and adjust therapy as needed. Avoid nephrotoxins and renally dose meds for GFR listed above.    Hold further IV diuretics for now  Consult Nephrology  Strict I/O's, avoid nephrotoxins  May require HD, has right AVF since Sept '23.    1/11:   Nephrology on case  Okay to resume diuresing   Continue to monitor renal functions    CHF (congestive heart failure)  Patient is identified as having  unknown, TTE pending  heart failure that is Acute. CHF is currently uncontrolled due to Rales/crackles on pulmonary exam and Pulmonary edema/pleural effusion on CXR. Latest ECHO performed and demonstrates- No results found for this or any previous visit.  .  Monitor strict Is&Os and daily weights.    Place on fluid restriction of 1.5 L.   Last BNP reviewed- and noted below   Recent Labs   Lab 01/10/24  1631   *       TTE  pending  Consult Cardiology    1/11:   Cardiology on case   Echo reviewed   Normal EF   Likely diastolic heart failure exacerbation  Patient still appears overloaded   Will continue Bumex 2 mg q.8h    Benign prostatic hyperplasia with urinary obstruction  Valera in place on arrival, placed at [facility]  CT C/A/P pending  Consider Urology consult pending CT results  Strict I/O's      Type 2 diabetes mellitus  Patient's FSGs are uncontrolled due to hyperglycemia on current medication regimen.  Last A1c reviewed-   Lab Results   Component Value Date    HGBA1C 7.4 (H) 06/24/2023     Most recent fingerstick glucose reviewed-   Recent Labs   Lab 01/10/24  1626   POCTGLUCOSE 405*     Current correctional scale  Medium  Maintain anti-hyperglycemic dose as follows-   Antihyperglycemics (From admission, onward)      Start     Stop Route Frequency Ordered    01/10/24 1730  insulin detemir U-100 (Levemir) pen 10 Units         -- SubQ Daily 01/10/24 1629    01/10/24 1728  insulin aspart U-100 pen 0-10 Units         -- SubQ Before meals & nightly PRN 01/10/24 1629          Hold Oral hypoglycemics while patient is in the hospital.    Normocytic anemia  Patient's anemia is currently controlled. Has not received any PRBCs to date. Etiology likely d/t chronic disease due to Chronic Kidney Disease/ESRD  Current CBC reviewed-   Lab Results   Component Value Date    HGB 8.8 (L) 01/10/2024    HCT 27.2 (L) 01/10/2024     Monitor serial CBC and transfuse if patient becomes hemodynamically unstable, symptomatic or H/H drops below 7/21.    Essential hypertension  Chronic, controlled. Latest blood pressure and vitals reviewed-     Temp:  [98.2 °F (36.8 °C)]   Pulse:  [85-92]   Resp:  [18-22]   BP: (174-180)/(73-75)   SpO2:  [95 %-96 %] .   Home meds for hypertension were reviewed and noted below.   Hypertension Medications               amLODIPine (NORVASC) 10 MG tablet Take 10 mg by mouth once daily.    carvediloL (COREG) 25 MG tablet  Take 25 mg by mouth 2 (two) times daily.    furosemide (LASIX) 80 MG tablet Take 80 mg by mouth 2 (two) times daily.    hydrALAZINE (APRESOLINE) 50 MG tablet Take 50 mg by mouth 3 (three) times daily.            While in the hospital, will manage blood pressure as follows; Continue home antihypertensive regimen    Will utilize p.r.n. blood pressure medication only if patient's blood pressure greater than 180/110 and he develops symptoms such as worsening chest pain or shortness of breath.      VTE Risk Mitigation (From admission, onward)           Ordered     IP VTE LOW RISK PATIENT  Once         01/10/24 1557     Place sequential compression device  Until discontinued         01/10/24 1557                    Discharge Planning   ALONDRA:      Code Status: Full Code   Is the patient medically ready for discharge?:     Reason for patient still in hospital (select all that apply): Patient trending condition  Discharge Plan A: Home Health                  Abhijeet Kennedy MD  Department of Hospital Medicine   [facility] - Telemetry ([facility])

## 2024-01-11 NOTE — HPI
Mr. Silverio is a 53 year old male patient whose current medical conditions include IDDM, HTN, CKD, prior CVA with residual right-sided hemiparesis, anemia of chronic disease, and anxiety who initially presented to [facility] on 1/8/24 due to SOB/CHF exacerbation, PNA, and CKD. Initial workup revealed BNP of 419, creatinine of 4.55, and anemia (H/H 8.6/27.4). Patient initially required bipap placement but improved after being treated with IV Lasix. He was also treated with abx and nebulizers. Transfer requested for nephrology consultation. Cardiology consulted to assist with management. Patient seen and examined today, resting in bed. Feels ok. Still SOB, on supplemental O2. Denies any current CP symptoms  Chart reviewed. Pro-meredith +. BNP improved to 240. Creatinine 4.7. Repeat CT of chest/abd/pelvis showed small bilateral effusions and multifocal pulmonary opacities possibly related to infection or non-infectious inflammation. TTE pending. Nephrology consulted, await rec's.
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
## 2024-01-11 NOTE — ASSESSMENT & PLAN NOTE
Patient is identified as having  unknown, TTE pending  heart failure that is Acute. CHF is currently uncontrolled due to Rales/crackles on pulmonary exam and Pulmonary edema/pleural effusion on CXR. Latest ECHO performed and demonstrates- No results found for this or any previous visit.  .  Monitor strict Is&Os and daily weights.    Place on fluid restriction of 1.5 L.   Last BNP reviewed- and noted below   Recent Labs   Lab 01/10/24  1631   *       TTE pending  Consult Cardiology    1/11:   Cardiology on case   Echo reviewed   Normal EF   Likely diastolic heart failure exacerbation  Patient still appears overloaded   Will continue Bumex 2 mg q.8h

## 2024-01-11 NOTE — SIGNIFICANT EVENT
Paged about an hypoxic episode, requiring escalation of O2 to 15L HFNC. By the time I arrived at bedside, patient had been put on NR mask. Saturating 97%. Sleeping comfortably. TANA possibly contributing. Stable at the time of this documentation.     Will continue to monitor. Defer further management to day-time provider.     Darrian Talley MD  [facility] Medicine

## 2024-01-11 NOTE — H&P
"  [facility] Medicine  History & Physical    Patient Name: Moody Silverio  MRN: [id number]  Patient Class: IP- Inpatient  Admission Date: 1/10/2024  Attending Physician: Brody Carrion MD   Primary Care Provider: [facility] Primary Doctor         Patient information was obtained from patient, past medical records, and ER records.     Subjective:     Principal Problem:Acute kidney injury superimposed on CKD    Chief Complaint: No chief complaint on file.       HPI: Moody Silverio is 52 y/o male with PMHx IDDM, HTN, CKD, Hx of CVA with right hemiparesis, anemia of chronic disease, BPH, chronic back pain, anxiety who presented to [facility] on 1-8-24 with c/o SOB due to volume overload/CHF exacerbation and pneumonia and also acute on CKD. History obtained from records sent from outside hospital, patient, and family at bedside. Patient remained at outside hospital ER till transfer to [facility] today (1-10-24). Patient arrived to [facility] with Valera in place and on supplemental O2. Patient with right forearm AV shunt. Patient reports continued SOB, constipation, abdominal distention, fatigue.    Outside hospital ER labs/imaging reviewed:  WBC 11  BUN/Cr 77/4.8 (1/8/24); Cr 4.55    H/H 8.6/27.4  CXR with bilateral upper and lower lobe patchy hazy pulmonary opacities either edema or pneumonia (1/9/24)  CT Chest "bilateral pneumonia with an underlying component of volume overload." (1/8/24)    ER course reviewed: patient was initially placed on BiPAP, transitioned to NC (2-3 L), and also required Valera cathter placement due to urinary outlet obstruction. He was treated with pneumonia and CHF exacerbation, received ceftriaxone, azithromycin, doxycycline, DuoNebs, Bumex IV, Lasix IV. Also noted he developed reaction to ceftriaxone, listed as allergy. [facility] seeking transfer for nephrology consult, accepted as inpatient to [facility] on 1-8-24.    Past " Medical History:   Diagnosis Date    Diabetes mellitus     Hypertension     Stroke /        Past Surgical History:   Procedure Laterality Date    LEG SURGERY      TOE AMPUTATION         Review of patient's allergies indicates:   Allergen Reactions    Ceftriaxone Itching       No current facility-administered medications on file prior to encounter.     Current Outpatient Medications on File Prior to Encounter   Medication Sig    amLODIPine (NORVASC) 10 MG tablet Take 10 mg by mouth once daily.    atorvastatin (LIPITOR) 40 MG tablet Take 40 mg by mouth nightly.    carvediloL (COREG) 25 MG tablet Take 25 mg by mouth 2 (two) times daily.    cephALEXin (KEFLEX) 500 MG capsule Take 500 mg by mouth 4 (four) times daily.    cholecalciferol, vitamin D3, 1,250 mcg (50,000 unit) capsule Take 50,000 Units by mouth every 7 days.    clopidogreL (PLAVIX) 75 mg tablet Take 75 mg by mouth once daily.    DULoxetine (CYMBALTA) 60 MG capsule Take 60 mg by mouth once daily.    folic acid (FOLVITE) 1 MG tablet Take 1,000 mcg by mouth once daily.    furosemide (LASIX) 80 MG tablet Take 80 mg by mouth 2 (two) times daily.    gabapentin (NEURONTIN) 300 MG capsule Take 300 mg by mouth 3 (three) times daily.    glipiZIDE (GLUCOTROL) 5 MG tablet Take 5 mg by mouth.    hydrALAZINE (APRESOLINE) 50 MG tablet Take 50 mg by mouth 3 (three) times daily.    morphine (MSIR) 30 MG tablet TAKE ONE TABLET 2-3 TIMES A DAY AS NEEDED FOR PAIN    MOVANTIK 25 mg tablet TAKE ONE TABLET BY MOUTH EVERY MORNING ON EMPTY STOMACH FOR CONSTIPATION    potassium chloride (KLOR-CON) 10 MEQ TbSR Take 10 mEq by mouth once daily.    promethazine (PHENERGAN) 12.5 MG Tab Take 12.5 mg by mouth 3 (three) times daily as needed.    tamsulosin (FLOMAX) 0.4 mg Cap Take 1 capsule by mouth once daily.    traZODone (DESYREL) 150 MG tablet Take 150-300 mg by mouth nightly as needed.    TRESIBA FLEXTOUCH U-200 200 unit/mL (3 mL) insulin pen SMARTSI Unit(s) SUB-Q Daily     "TRULICITY 1.5 mg/0.5 mL pen injector SMARTSI Pre-Filled Pen Syringe SUB-Q Once a Week    ULTICARE PEN NEEDLE 31 gauge x 3/16" Ndle USE 3 TO 4 TIMES A DAY     Family History       Problem Relation (Age of Onset)    Cancer Mother    Diabetes Brother    Hypertension Mother, Father          Tobacco Use    Smoking status: Never    Smokeless tobacco: Never   Substance and Sexual Activity    Alcohol use: Not Currently    Drug use: Not on file    Sexual activity: Not on file     Review of Systems   All other systems reviewed and are negative.    Objective:     Vital Signs (Most Recent):    Vital Signs (24h Range):  Temp:  [98.2 °F (36.8 °C)] 98.2 °F (36.8 °C)  Pulse:  [85-92] 92  Resp:  [18-22] 18  SpO2:  [95 %-96 %] 95 %  BP: (174-180)/(73-75) 180/75        There is no height or weight on file to calculate BMI.     Physical Exam  Vitals and nursing note reviewed.   Constitutional:       General: He is not in acute distress.     Appearance: Normal appearance. He is normal weight. He is ill-appearing.      Comments: Wearing 2 L NC   HENT:      Head: Normocephalic and atraumatic.      Nose: Nose normal.      Mouth/Throat:      Pharynx: Oropharynx is clear.   Eyes:      Extraocular Movements: Extraocular movements intact.      Pupils: Pupils are equal, round, and reactive to light.   Cardiovascular:      Rate and Rhythm: Tachycardia present.      Pulses: Normal pulses.      Heart sounds: Normal heart sounds.   Pulmonary:      Effort: Pulmonary effort is normal. No respiratory distress.      Breath sounds: Rales present. No wheezing or rhonchi.      Comments: Decreased BS bilaterally  Abdominal:      General: Abdomen is flat. Bowel sounds are normal. There is distension.      Palpations: Abdomen is soft.      Tenderness: There is no abdominal tenderness. There is no guarding.   Genitourinary:     Comments: Valera in place  Musculoskeletal:      Comments: Right forearm AV shunt   Neurological:      General: No focal deficit " present.      Mental Status: He is alert and oriented to person, place, and time.   Psychiatric:         Mood and Affect: Mood normal.         Behavior: Behavior normal.              CRANIAL NERVES     CN III, IV, VI   Pupils are equal, round, and reactive to light.       Significant Labs: All pertinent labs within the past 24 hours have been reviewed.  Recent Lab Results         01/10/24  1626        POCT Glucose 405               Significant Imaging: I have reviewed all pertinent imaging results/findings within the past 24 hours.    X-Ray Chest AP Portable    (Results Pending)   X-Ray Abdomen AP 1 View    (Results Pending)       Assessment/Plan:     * Acute kidney injury superimposed on CKD  Patient with acute kidney injury/acute renal failure likely due to post-obstructive d/t bladder outlet obstruction  ELMIRA is currently worsening. Baseline creatinine unknown - Labs reviewed- Renal function/electrolytes with CrCl cannot be calculated (Unknown ideal weight.). according to latest data. Monitor urine output and serial BMP and adjust therapy as needed. Avoid nephrotoxins and renally dose meds for GFR listed above.    Hold further IV diuretics for now  Consult Nephrology  Strict I/O's, avoid nephrotoxins  May require HD, has right AVF since Sept '23.    CHF (congestive heart failure)  Patient is identified as having  unknown, TTE pending  heart failure that is Acute. CHF is currently uncontrolled due to Rales/crackles on pulmonary exam and Pulmonary edema/pleural effusion on CXR. Latest ECHO performed and demonstrates- No results found for this or any previous visit.  .  Monitor strict Is&Os and daily weights.    Place on fluid restriction of 1.5 L.   Last BNP reviewed- and noted below   Recent Labs   Lab 01/10/24  1631   *     TTE pending  Consult Cardiology    Benign prostatic hyperplasia with urinary obstruction  Valera in place on arrival, placed at [facility]  CT C/A/P pending  Consider Urology consult  pending CT results  Strict I/O's      Essential hypertension  Chronic, controlled. Latest blood pressure and vitals reviewed-     Temp:  [98.2 °F (36.8 °C)]   Pulse:  [85-92]   Resp:  [18-22]   BP: (174-180)/(73-75)   SpO2:  [95 %-96 %] .   Home meds for hypertension were reviewed and noted below.   Hypertension Medications               amLODIPine (NORVASC) 10 MG tablet Take 10 mg by mouth once daily.    carvediloL (COREG) 25 MG tablet Take 25 mg by mouth 2 (two) times daily.    furosemide (LASIX) 80 MG tablet Take 80 mg by mouth 2 (two) times daily.    hydrALAZINE (APRESOLINE) 50 MG tablet Take 50 mg by mouth 3 (three) times daily.            While in the hospital, will manage blood pressure as follows; Continue home antihypertensive regimen    Will utilize p.r.n. blood pressure medication only if patient's blood pressure greater than 180/110 and he develops symptoms such as worsening chest pain or shortness of breath.    Normocytic anemia  Patient's anemia is currently controlled. Has not received any PRBCs to date. Etiology likely d/t chronic disease due to Chronic Kidney Disease/ESRD  Current CBC reviewed-   Lab Results   Component Value Date    HGB 8.8 (L) 01/10/2024    HCT 27.2 (L) 01/10/2024     Monitor serial CBC and transfuse if patient becomes hemodynamically unstable, symptomatic or H/H drops below 7/21.    Type 2 diabetes mellitus  Patient's FSGs are uncontrolled due to hyperglycemia on current medication regimen.  Last A1c reviewed-   Lab Results   Component Value Date    HGBA1C 7.4 (H) 06/24/2023     Most recent fingerstick glucose reviewed-   Recent Labs   Lab 01/10/24  1626   POCTGLUCOSE 405*     Current correctional scale  Medium  Maintain anti-hyperglycemic dose as follows-   Antihyperglycemics (From admission, onward)      Start     Stop Route Frequency Ordered    01/10/24 1730  insulin detemir U-100 (Levemir) pen 10 Units         -- SubQ Daily 01/10/24 1629    01/10/24 1728  insulin aspart U-100  pen 0-10 Units         -- SubQ Before meals & nightly PRN 01/10/24 1629          Hold Oral hypoglycemics while patient is in the hospital.      VTE Risk Mitigation (From admission, onward)           Ordered     IP VTE LOW RISK PATIENT  Once         01/10/24 1557     Place sequential compression device  Until discontinued         01/10/24 6777                                    Brody Carrion MD  Department of Hospital Medicine  [facility] - Telemetry ([facility])

## 2024-01-11 NOTE — ASSESSMENT & PLAN NOTE
Patient with acute kidney injury/acute renal failure likely due to post-obstructive d/t bladder outlet obstruction  ELMIRA is currently worsening. Baseline creatinine unknown - Labs reviewed- Renal function/electrolytes with CrCl cannot be calculated (Unknown ideal weight.). according to latest data. Monitor urine output and serial BMP and adjust therapy as needed. Avoid nephrotoxins and renally dose meds for GFR listed above.    Hold further IV diuretics for now  Consult Nephrology  Strict I/O's, avoid nephrotoxins  May require HD, has right AVF since Sept '23.

## 2024-01-11 NOTE — CONSULTS
[facility] - Telemetry ([facility])  Nephrology  Consult Note    Patient Name: Moody Silverio  MRN: [id number]  Admission Date: 1/10/2024  Hospital Length of Stay: 1 days  Attending Provider: Abhijeet Kennedy MD   Primary Care Physician: Jersey Nolan MD  Principal Problem:Acute kidney injury superimposed on CKD    Inpatient consult to Nephrology  Consult performed by: Howie Aldridge MD  Consult ordered by: Brody Carrion MD  Reason for consult: CKD, ELMIRA        Subjective:     HPI:  53-year-old male with history of advanced chronic kidney disease.  He is followed by Dr. Ramirez with [facility].  Presented to an outlying facility with worsening shortness of breath.  Noted to have elevated creatinine.  Nephrology has been consulted for evaluation.  Patient was seen in his hospital room.  In bed resting comfortably.  100% non-rebreather in place.  States that his breathing is okay as long as he has a non-rebreather in place.    On review of old records he last saw his primary nephrologist at the end of November last year.  It looks like his creatinine at that time was in the mid threes.  He has a maturing left forearm radiocephalic fistula in place.    Past Medical History:   Diagnosis Date    Diabetes mellitus     Hypertension     Stroke 2017/ 2018       Past Surgical History:   Procedure Laterality Date    LEG SURGERY      TOE AMPUTATION         Review of patient's allergies indicates:   Allergen Reactions    Ceftriaxone Itching     Current Facility-Administered Medications   Medication Frequency    amLODIPine tablet 10 mg Daily    atorvastatin tablet 40 mg Nightly    carvediloL tablet 25 mg BID WM    clopidogreL tablet 75 mg Daily    dextrose 10% bolus 125 mL 125 mL PRN    dextrose 10% bolus 125 mL 125 mL PRN    dextrose 10% bolus 250 mL 250 mL PRN    dextrose 10% bolus 250 mL 250 mL PRN    doxycycline tablet 100 mg Q12H    DULoxetine DR capsule 60 mg Daily    gabapentin capsule 300 mg TID    glucagon (human  recombinant) injection 1 mg PRN    glucagon (human recombinant) injection 1 mg PRN    glucose chewable tablet 16 g PRN    glucose chewable tablet 16 g PRN    glucose chewable tablet 24 g PRN    glucose chewable tablet 24 g PRN    hydrALAZINE injection 10 mg Q6H PRN    hydrALAZINE tablet 50 mg TID    insulin aspart U-100 pen 0-10 Units QID (AC + HS) PRN    insulin detemir U-100 (Levemir) pen 10 Units Daily    mupirocin 2 % ointment BID    naloxone 0.4 mg/mL injection 0.02 mg PRN    sodium chloride 0.9% flush 10 mL Q12H PRN    tamsulosin 24 hr capsule 0.4 mg Daily    traZODone tablet 100 mg Nightly PRN     Family History       Problem Relation (Age of Onset)    Cancer Mother    Diabetes Brother    Hypertension Mother, Father          Tobacco Use    Smoking status: Never    Smokeless tobacco: Never   Substance and Sexual Activity    Alcohol use: Not Currently    Drug use: Not on file    Sexual activity: Not on file     Review of Systems   Constitutional: Negative.    HENT: Negative.     Respiratory:  Positive for shortness of breath.    Cardiovascular: Negative.    Gastrointestinal: Negative.    Genitourinary: Negative.    Musculoskeletal: Negative.    Skin: Negative.      Objective:     Vital Signs (Most Recent):  Temp: 98.3 °F (36.8 °C) (01/11/24 0759)  Pulse: 75 (01/11/24 1159)  Resp: 18 (01/11/24 0759)  BP: (!) 176/79 (01/11/24 0935)  SpO2: (!) 92 % (01/11/24 0759) Vital Signs (24h Range):  Temp:  [97.2 °F (36.2 °C)-98.5 °F (36.9 °C)] 98.3 °F (36.8 °C)  Pulse:  [50-91] 75  Resp:  [15-18] 18  SpO2:  [88 %-97 %] 92 %  BP: (172-185)/(79-86) 176/79     Weight: 126.1 kg (278 lb) (01/11/24 0935)  Body mass index is 36.68 kg/m².  Body surface area is 2.55 meters squared.    No intake/output data recorded.    Physical Exam  Constitutional:       General: He is not in acute distress.     Appearance: He is ill-appearing.   HENT:      Head: Normocephalic and atraumatic.   Eyes:      General: No scleral icterus.      Extraocular Movements: Extraocular movements intact.      Pupils: Pupils are equal, round, and reactive to light.   Cardiovascular:      Rate and Rhythm: Normal rate and regular rhythm.   Pulmonary:      Effort: Pulmonary effort is normal.      Breath sounds: No stridor.   Musculoskeletal:      Right lower leg: Edema present.      Left lower leg: Edema present.   Skin:     General: Skin is warm and dry.   Neurological:      Mental Status: He is alert. Mental status is at baseline. He is disoriented.   Psychiatric:         Mood and Affect: Mood normal.         Behavior: Behavior normal.         Significant Labs:  BMP:   Recent Labs   Lab 01/11/24  0551   *   *   K 3.9      CO2 14*   BUN 97*   CREATININE 4.7*   CALCIUM 7.6*     CMP:   Recent Labs   Lab 01/10/24  1631 01/11/24  0551   * 222*   CALCIUM 7.6* 7.6*   ALBUMIN 2.5*  --    PROT 7.5  --    * 135*   K 3.9 3.9   CO2 14* 14*    105   BUN 92* 97*   CREATININE 4.8* 4.7*   ALKPHOS 66  --    ALT 14  --    AST 13  --    BILITOT 0.5  --      All labs within the past 24 hours have been reviewed.    Significant Imaging:  Labs: Reviewed      Assessment/Plan:     Active Diagnoses:    Diagnosis Date Noted POA    PRINCIPAL PROBLEM:  Acute kidney injury superimposed on CKD [N17.9, N18.9] 01/10/2024 Yes    Benign prostatic hyperplasia with urinary obstruction [N40.1, N13.8] 01/10/2024 Yes    CHF (congestive heart failure) [I50.9] 01/10/2024 Yes    Type 2 diabetes mellitus [E11.9] 07/27/2019 Yes    Normocytic anemia [D64.9] 07/27/2019 Yes    Essential hypertension [I10] 07/27/2019 Yes      Problems Resolved During this Admission:       Assessment and plan:    1. Shortness of breath:  Clinical presentation and physical exam findings consistent with decompensated CHF.  On review of his CT scan abdomen and pelvis his inferior vena cava is markedly dilated.  This is supportive increased central venous pressures.    Would suggest aggressive  diuresis.  If his creatinine worsens or were unable to remove adequate volume medically dialysis could be implemented.    I discussed with him this morning that he may require dialysis for ultrafiltration.  He seemed agreeable if needed.  His primary concern is whether not he would be on dialysis for the rest of his life.  I discussed with him that if we initiated dialysis he would likely need dialytic support for the rest of his life.  He verbalized understanding.    2. CKD 4:  On review of notes from his primary nephrologist and laboratory studies for the past 6 months to a year he has had steadily progressive chronic kidney disease.  His creatinine increased late last year in the mid threes.  Creatinine was up to 4.7 on labs today.    He has a well healed right radiocephalic fistula in place however on exam I am not sure that it is accessible.  If we need dialytic support we may have to place a central venous catheter.    3. Electrolytes:  Potassium is stable at 3.9.    4. Acid-base: Serum bicarbonate is low at 14.  I would not start oral replacement therapy at this time given the fact the as decompensated congestive heart failure.    5. Volume:  He does appear to be significantly overloaded.  He has lower extremity edema and evidence of pulmonary edema.    Will start Bumex 2 mg q.8 hours.  Could add Diuril as well to block the distal convoluted tubule.    Approximately 70 minutes was spent in face-to-face conversation, chart review and review of records from his primary nephrologist.            Thank you for your consult.     Howie Aldridge MD  Nephrology  [facility] - Telemetry ([facility])

## 2024-01-12 PROBLEM — E11.22 STAGE 5 CHRONIC KIDNEY DISEASE DUE TO TYPE 2 DIABETES MELLITUS: Status: ACTIVE | Noted: 2024-01-01

## 2024-01-12 PROBLEM — N18.5 STAGE 5 CHRONIC KIDNEY DISEASE DUE TO TYPE 2 DIABETES MELLITUS: Status: ACTIVE | Noted: 2024-01-01

## 2024-01-12 PROBLEM — J96.01 ACUTE HYPOXIC RESPIRATORY FAILURE: Status: ACTIVE | Noted: 2024-01-01

## 2024-01-12 NOTE — SUBJECTIVE & OBJECTIVE
Review of Systems   Constitutional: Positive for malaise/fatigue.   HENT: Negative.     Eyes: Negative.    Cardiovascular:  Positive for dyspnea on exertion and leg swelling.   Respiratory:  Positive for shortness of breath.    Endocrine: Negative.    Hematologic/Lymphatic: Negative.    Skin: Negative.    Musculoskeletal: Negative.    Gastrointestinal: Negative.    Genitourinary: Negative.    Neurological:  Positive for weakness.   Psychiatric/Behavioral: Negative.     Allergic/Immunologic: Negative.      Objective:     Vital Signs (Most Recent):  Temp: 99.8 °F (37.7 °C) (01/12/24 1230)  Pulse: 88 (01/12/24 1230)  Resp: 18 (01/12/24 1230)  BP: (!) 173/79 (01/12/24 1230)  SpO2: 95 % (01/12/24 1230) Vital Signs (24h Range):  Temp:  [98 °F (36.7 °C)-99.8 °F (37.7 °C)] 99.8 °F (37.7 °C)  Pulse:  [74-88] 88  Resp:  [16-24] 18  SpO2:  [90 %-98 %] 95 %  BP: (153-177)/(71-79) 173/79     Weight: 126.1 kg (278 lb)  Body mass index is 36.68 kg/m².     SpO2: 95 %         Intake/Output Summary (Last 24 hours) at 1/12/2024 1231  Last data filed at 1/12/2024 1042  Gross per 24 hour   Intake 960 ml   Output 1400 ml   Net -440 ml       Lines/Drains/Airways       Drain  Duration                  Urethral Catheter 01/11/24 1900 16 Fr. <1 day              Peripheral Intravenous Line  Duration                  Hemodialysis AV Fistula Right forearm -- days         Peripheral IV - Single Lumen 01/11/24 1620 22 G Anterior;Left Hand <1 day                       Physical Exam  Vitals and nursing note reviewed.   Constitutional:       General: He is not in acute distress.     Appearance: He is well-developed. He is ill-appearing. He is not diaphoretic.      Comments: ON vapotherm   HENT:      Head: Normocephalic and atraumatic.   Eyes:      General:         Right eye: No discharge.         Left eye: No discharge.      Pupils: Pupils are equal, round, and reactive to light.   Cardiovascular:      Rate and Rhythm: Normal rate and regular  "rhythm.      Heart sounds: Normal heart sounds, S1 normal and S2 normal. No murmur heard.  Pulmonary:      Breath sounds: Rhonchi and rales present. No wheezing.   Abdominal:      General: There is no distension.   Musculoskeletal:      Right lower leg: Edema present.      Left lower leg: Edema present.   Skin:     General: Skin is warm and dry.      Findings: No erythema.   Neurological:      General: No focal deficit present.      Mental Status: He is alert and oriented to person, place, and time.   Psychiatric:         Mood and Affect: Mood normal.         Behavior: Behavior normal.         Thought Content: Thought content normal.            Significant Labs: CMP   Recent Labs   Lab 01/10/24  1631 01/11/24  0551 01/12/24  0612   * 135* 137   K 3.9 3.9 3.9    105 106   CO2 14* 14* 17*   * 222* 162*   BUN 92* 97* 97*   CREATININE 4.8* 4.7* 4.8*   CALCIUM 7.6* 7.6* 7.2*   PROT 7.5  --   --    ALBUMIN 2.5*  --  2.3*   BILITOT 0.5  --   --    ALKPHOS 66  --   --    AST 13  --   --    ALT 14  --   --    ANIONGAP 16 16 14   , CBC   Recent Labs   Lab 01/10/24  1631 01/11/24  0551 01/12/24  0607   WBC 8.00 9.68 8.81   HGB 8.8* 8.2* 7.7*   HCT 27.2* 25.4* 24.5*    375 360   , Troponin No results for input(s): "TROPONINI" in the last 48 hours., and All pertinent lab results from the last 24 hours have been reviewed.    Significant Imaging: Echocardiogram: Transthoracic echo (TTE) complete (Cupid Only):   Results for orders placed or performed during the hospital encounter of 01/10/24   Echo   Result Value Ref Range    Left Atrium Major Axis 4.98 cm    Left Atrium Minor Axis 5.04 cm    LV Diastolic Volume 116.57 mL    LV Systolic Volume 44.85 mL    MV Peak A Jose 0.93 m/s    TR Max Jose 2.76 m/s    MV Peak E Jose 1.17 m/s    PV mean gradient 1 mmHg    RVOT peak VTI 15.5 cm    RVOT peak jose 0.73 m/s    Ao VTI 25.40 cm    Ao peak jose 1.02 m/s    LVOT peak VTI 19.90 cm    LVOT peak jose 0.88 m/s    LVOT " diameter 2.36 cm    IVRT 91.34 msec    E wave deceleration time 175.34 msec    PV peak gradient 3 mmHg    AV mean gradient 3 mmHg    LA size 3.10 cm    Ascending aorta 3.15 cm    STJ 2.97 cm    LVIDs 3.32 2.1 - 4.0 cm    Ao root annulus 3.69 cm    Posterior Wall 1.09 0.6 - 1.1 cm    IVS 1.24 (A) 0.6 - 1.1 cm    LVIDd 4.97 3.5 - 6.0 cm    PV PEAK VELOCITY 0.89 m/s    TDI LATERAL 0.09 m/s    Left Ventricular Outflow Tract Mean Gradient 1.95 mmHg    Left Ventricular Outflow Tract Mean Velocity 0.67 cm/s    TDI SEPTAL 0.09 m/s    LV LATERAL E/E' RATIO 13.00 m/s    LV SEPTAL E/E' RATIO 13.00 m/s    FS 33 28 - 44 %    LV mass 222.12 g    ZLVIDD -9.79     ZLVIDS -6.81     Left Ventricle Relative Wall Thickness 0.44 cm    AV valve area 3.43 cm²    AV Velocity Ratio 0.86     AV index (prosthetic) 0.78     E/A ratio 1.26     Mean e' 0.09 m/s    LVOT area 4.4 cm2    LVOT stroke volume 87.01 cm3    AV peak gradient 4 mmHg    E/E' ratio 13.00 m/s    LV Systolic Volume Index 18.1 mL/m2    LV Diastolic Volume Index 47.00 mL/m2    LV Mass Index 90 g/m2    Triscuspid Valve Regurgitation Peak Gradient 30 mmHg    CLARA by Velocity Ratio 3.77 cm²    BSA 2.55 m2    LA Volume Index 20.8 mL/m2    LA volume 51.48 cm3    LA WIDTH 3.9 cm    TV resting pulmonary artery pressure 33 mmHg    RV TB RVSP 6 mmHg    Est. RA pres 3 mmHg    Narrative      Left Ventricle: The left ventricle is normal in size. Moderately   increased wall thickness. There is concentric remodeling. Normal wall   motion. There is normal systolic function with a visually estimated   ejection fraction of 60 - 65%. Grade II diastolic dysfunction. Elevated   left ventricular filling pressure.    Right Ventricle: Normal right ventricular cavity size. Wall thickness   is normal. Right ventricle wall motion  is normal. Systolic function is   borderline low.    Tricuspid Valve: There is mild regurgitation.    Pulmonary Artery: The estimated pulmonary artery systolic pressure is   33  mmHg.    IVC/SVC: Normal venous pressure at 3 mmHg.     , EKG: Reviewed, and X-Ray: CXR: X-Ray Chest 1 View (CXR):   Results for orders placed or performed during the hospital encounter of 01/10/24   X-Ray Chest 1 View    Narrative    EXAMINATION:  XR CHEST 1 VIEW    CLINICAL HISTORY:  sob;    COMPARISON:  01/10/2024    FINDINGS:  The size of the heart is normal.  There has been slight interval worsening of the appearance of the lungs.  There is a moderate amount of alveolar consolidation in both lungs.  There is no pneumothorax.  The costophrenic angles are sharp.      Impression    There has been slight interval worsening of the appearance of the lungs. There is a moderate amount of alveolar consolidation in both lungs. .      Electronically signed by: Brad Davis MD  Date:    01/12/2024  Time:    11:56    and X-Ray Chest PA and Lateral (CXR): No results found for this visit on 01/10/24.

## 2024-01-12 NOTE — NURSING
Patient's IV infiltrated, spoke to CN, she is going to attempt to start IV after shift change. Will pass along to dayshift that the patient still needs his AM dose of Bumex

## 2024-01-12 NOTE — HOSPITAL COURSE
1/12/24-Patient seen and examined today. More SOB, on Vapotherm. No CP. Started on IV Bumex by nephrology yesterday, still appears overloaded. CXR with worsening appearance. Labs reviewed. Creatinine 4.8. H/H 7.7/24.5.

## 2024-01-12 NOTE — ASSESSMENT & PLAN NOTE
-Presents with decompensated CHF  -BNP improving but CT of chest/abdomen/pelvis still with small effusions and bilateral opacities (infectious vs non-infectious inflammation) and still appears overloaded  -Await nephrology rec's  -Continue BB, hydralazine as tolerated  -Strict I's/O's  -TTE pending    1/12/24  -TTE with normal EF, DD  -Still appears overloaded, CXR with worsening appearance, on Vapotherm this AM  -Continue IV diuresis  -Nephrology following

## 2024-01-12 NOTE — PLAN OF CARE
Patient resting, no distress noted, patient on tele [id number] SR, patient on 12L high flow O2, 16Fr vargas catheter present, right wrist AV fistula positive for bruit and thrill, fall precautions in place, chart check completed, blood sugars monitored, fluid restriction, call light within reach, patient blind

## 2024-01-12 NOTE — ASSESSMENT & PLAN NOTE
Patient with acute kidney injury/acute renal failure likely due to post-obstructive d/t bladder outlet obstruction  ELMIRA is currently worsening. Baseline creatinine unknown - Labs reviewed- Renal function/electrolytes with Estimated Creatinine Clearance: 24.2 mL/min (A) (based on SCr of 4.8 mg/dL (H)). according to latest data. Monitor urine output and serial BMP and adjust therapy as needed. Avoid nephrotoxins and renally dose meds for GFR listed above.    Hold further IV diuretics for now  Consult Nephrology  Strict I/O's, avoid nephrotoxins  May require HD, has right AVF since Sept '23.    1/12:   Nephrology on case  Okay to resume diuresing   Continue to monitor renal functions  Continue Bumex

## 2024-01-12 NOTE — ASSESSMENT & PLAN NOTE
Patient's anemia is currently controlled. Has not received any PRBCs to date. Etiology likely d/t chronic disease due to Chronic Kidney Disease/ESRD  Current CBC reviewed-   Lab Results   Component Value Date    HGB 7.7 (L) 01/12/2024    HCT 24.5 (L) 01/12/2024     Monitor serial CBC and transfuse if patient becomes hemodynamically unstable, symptomatic or H/H drops below 7/21.    1/12:   Likely anemia of chronic disease   Will check iron studies   Consider EPO if iron studies within normal

## 2024-01-12 NOTE — ASSESSMENT & PLAN NOTE
Patient with Hypoxic Respiratory failure which is Acute.  he is not on home oxygen. Supplemental oxygen was provided and noted- Oxygen Concentration (%):  [80-90] 80    .   Signs/symptoms of respiratory failure include- tachypnea, increased work of breathing, respiratory distress, and use of accessory muscles. Contributing diagnoses includes - CHF Labs and images were reviewed. Patient Has not had a recent ABG. Will treat underlying causes and adjust management of respiratory failure as follows-     Will continue to diurese   Repeat chest x-ray today  Currently on Vapotherm   Wean O2 as tolerated

## 2024-01-12 NOTE — SUBJECTIVE & OBJECTIVE
Interval History:  Patient denies any issues overnight.  Currently complaining of sinus congestion.    Review of Systems   Constitutional:  Negative for fatigue and fever.   HENT:  Negative for sinus pressure.    Eyes:  Negative for visual disturbance.   Respiratory:  Positive for shortness of breath.    Cardiovascular:  Positive for leg swelling. Negative for chest pain.   Gastrointestinal:  Negative for nausea and vomiting.   Genitourinary:  Negative for difficulty urinating.   Musculoskeletal:  Negative for back pain.   Skin:  Negative for rash.   Neurological:  Negative for headaches.   Psychiatric/Behavioral:  Negative for confusion.      Objective:     Vital Signs (Most Recent):  Temp: 98 °F (36.7 °C) (01/12/24 0813)  Pulse: 75 (01/12/24 1115)  Resp: (!) 24 (01/12/24 1115)  BP: (!) 173/79 (01/12/24 0813)  SpO2: 95 % (01/12/24 1115) Vital Signs (24h Range):  Temp:  [98 °F (36.7 °C)-98.7 °F (37.1 °C)] 98 °F (36.7 °C)  Pulse:  [74-85] 75  Resp:  [16-24] 24  SpO2:  [90 %-98 %] 95 %  BP: (153-177)/(71-79) 173/79     Weight: 126.1 kg (278 lb)  Body mass index is 36.68 kg/m².    Intake/Output Summary (Last 24 hours) at 1/12/2024 1135  Last data filed at 1/12/2024 1042  Gross per 24 hour   Intake 960 ml   Output 1400 ml   Net -440 ml         Physical Exam  Constitutional:       General: He is not in acute distress.     Appearance: He is well-developed. He is obese. He is not diaphoretic.   HENT:      Head: Normocephalic and atraumatic.   Eyes:      Pupils: Pupils are equal, round, and reactive to light.   Cardiovascular:      Rate and Rhythm: Normal rate and regular rhythm.      Heart sounds: Normal heart sounds. No murmur heard.     No friction rub. No gallop.   Pulmonary:      Effort: Pulmonary effort is normal. No respiratory distress.      Breath sounds: No stridor. Rales present. No wheezing.   Abdominal:      General: Bowel sounds are normal. There is no distension.      Palpations: Abdomen is soft. There is no  mass.      Tenderness: There is no abdominal tenderness. There is no guarding.   Musculoskeletal:      Right lower leg: Edema present.      Left lower leg: Edema present.   Skin:     General: Skin is warm.      Findings: No erythema.   Neurological:      Mental Status: He is alert and oriented to person, place, and time.             Significant Labs: All pertinent labs within the past 24 hours have been reviewed.    Significant Imaging: I have reviewed all pertinent imaging results/findings within the past 24 hours.

## 2024-01-12 NOTE — ASSESSMENT & PLAN NOTE
Patient is identified as having  unknown, TTE pending  heart failure that is Acute. CHF is currently uncontrolled due to Rales/crackles on pulmonary exam and Pulmonary edema/pleural effusion on CXR. Latest ECHO performed and demonstrates- No results found for this or any previous visit.  .  Monitor strict Is&Os and daily weights.    Place on fluid restriction of 1.5 L.   Last BNP reviewed- and noted below   Recent Labs   Lab 01/10/24  1631   *       TTE pending  Consult Cardiology    1/12:   Cardiology on case   Echo reviewed   Normal EF   Likely diastolic heart failure exacerbation  Patient still appears overloaded   Will continue Bumex 2 mg q.8h

## 2024-01-12 NOTE — PROGRESS NOTES
[facility] - Telemetry ([facility])  Cardiology  Progress Note    Patient Name: Moody Silverio  MRN: [id number]  Admission Date: 1/10/2024  Hospital Length of Stay: 2 days  Code Status: Full Code   Attending Physician: Abhijeet Kennedy MD   Primary Care Physician: Jersey Nolan MD  Expected Discharge Date:   Principal Problem:Acute kidney injury superimposed on CKD    Subjective:   HPI:  Mr. Silverio is a 53 year old male patient whose current medical conditions include IDDM, HTN, CKD, prior CVA with residual right-sided hemiparesis, anemia of chronic disease, and anxiety who initially presented to [facility] on 1/8/24 due to SOB/CHF exacerbation, PNA, and CKD. Initial workup revealed BNP of 419, creatinine of 4.55, and anemia (H/H 8.6/27.4). Patient initially required bipap placement but improved after being treated with IV Lasix. He was also treated with abx and nebulizers. Transfer requested for nephrology consultation. Cardiology consulted to assist with management. Patient seen and examined today, resting in bed. Feels ok. Still SOB, on supplemental O2. Denies any current CP symptoms  Chart reviewed. Pro-meredith +. BNP improved to 240. Creatinine 4.7. Repeat CT of chest/abd/pelvis showed small bilateral effusions and multifocal pulmonary opacities possibly related to infection or non-infectious inflammation. TTE pending. Nephrology consulted, await rec's.             Hospital Course:   1/12/24-Patient seen and examined today. More SOB, on Vapotherm. No CP. Started on IV Bumex by nephrology yesterday, still appears overloaded. CXR with worsening appearance. Labs reviewed. Creatinine 4.8. H/H 7.7/24.5.        Review of Systems   Constitutional: Positive for malaise/fatigue.   HENT: Negative.     Eyes: Negative.    Cardiovascular:  Positive for dyspnea on exertion and leg swelling.   Respiratory:  Positive for shortness of breath.    Endocrine: Negative.    Hematologic/Lymphatic: Negative.    Skin: Negative.     Musculoskeletal: Negative.    Gastrointestinal: Negative.    Genitourinary: Negative.    Neurological:  Positive for weakness.   Psychiatric/Behavioral: Negative.     Allergic/Immunologic: Negative.      Objective:     Vital Signs (Most Recent):  Temp: 99.8 °F (37.7 °C) (01/12/24 1230)  Pulse: 88 (01/12/24 1230)  Resp: 18 (01/12/24 1230)  BP: (!) 173/79 (01/12/24 1230)  SpO2: 95 % (01/12/24 1230) Vital Signs (24h Range):  Temp:  [98 °F (36.7 °C)-99.8 °F (37.7 °C)] 99.8 °F (37.7 °C)  Pulse:  [74-88] 88  Resp:  [16-24] 18  SpO2:  [90 %-98 %] 95 %  BP: (153-177)/(71-79) 173/79     Weight: 126.1 kg (278 lb)  Body mass index is 36.68 kg/m².     SpO2: 95 %         Intake/Output Summary (Last 24 hours) at 1/12/2024 1231  Last data filed at 1/12/2024 1042  Gross per 24 hour   Intake 960 ml   Output 1400 ml   Net -440 ml       Lines/Drains/Airways       Drain  Duration                  Urethral Catheter 01/11/24 1900 16 Fr. <1 day              Peripheral Intravenous Line  Duration                  Hemodialysis AV Fistula Right forearm -- days         Peripheral IV - Single Lumen 01/11/24 1620 22 G Anterior;Left Hand <1 day                       Physical Exam  Vitals and nursing note reviewed.   Constitutional:       General: He is not in acute distress.     Appearance: He is well-developed. He is ill-appearing. He is not diaphoretic.      Comments: ON vapotherm   HENT:      Head: Normocephalic and atraumatic.   Eyes:      General:         Right eye: No discharge.         Left eye: No discharge.      Pupils: Pupils are equal, round, and reactive to light.   Cardiovascular:      Rate and Rhythm: Normal rate and regular rhythm.      Heart sounds: Normal heart sounds, S1 normal and S2 normal. No murmur heard.  Pulmonary:      Breath sounds: Rhonchi and rales present. No wheezing.   Abdominal:      General: There is no distension.   Musculoskeletal:      Right lower leg: Edema present.      Left lower leg: Edema present.  "  Skin:     General: Skin is warm and dry.      Findings: No erythema.   Neurological:      General: No focal deficit present.      Mental Status: He is alert and oriented to person, place, and time.   Psychiatric:         Mood and Affect: Mood normal.         Behavior: Behavior normal.         Thought Content: Thought content normal.            Significant Labs: CMP   Recent Labs   Lab 01/10/24  1631 01/11/24  0551 01/12/24  0612   * 135* 137   K 3.9 3.9 3.9    105 106   CO2 14* 14* 17*   * 222* 162*   BUN 92* 97* 97*   CREATININE 4.8* 4.7* 4.8*   CALCIUM 7.6* 7.6* 7.2*   PROT 7.5  --   --    ALBUMIN 2.5*  --  2.3*   BILITOT 0.5  --   --    ALKPHOS 66  --   --    AST 13  --   --    ALT 14  --   --    ANIONGAP 16 16 14   , CBC   Recent Labs   Lab 01/10/24  1631 01/11/24  0551 01/12/24  0607   WBC 8.00 9.68 8.81   HGB 8.8* 8.2* 7.7*   HCT 27.2* 25.4* 24.5*    375 360   , Troponin No results for input(s): "TROPONINI" in the last 48 hours., and All pertinent lab results from the last 24 hours have been reviewed.    Significant Imaging: Echocardiogram: Transthoracic echo (TTE) complete (Cupid Only):   Results for orders placed or performed during the hospital encounter of 01/10/24   Echo   Result Value Ref Range    Left Atrium Major Axis 4.98 cm    Left Atrium Minor Axis 5.04 cm    LV Diastolic Volume 116.57 mL    LV Systolic Volume 44.85 mL    MV Peak A Jose 0.93 m/s    TR Max Jose 2.76 m/s    MV Peak E Jose 1.17 m/s    PV mean gradient 1 mmHg    RVOT peak VTI 15.5 cm    RVOT peak jose 0.73 m/s    Ao VTI 25.40 cm    Ao peak jose 1.02 m/s    LVOT peak VTI 19.90 cm    LVOT peak jose 0.88 m/s    LVOT diameter 2.36 cm    IVRT 91.34 msec    E wave deceleration time 175.34 msec    PV peak gradient 3 mmHg    AV mean gradient 3 mmHg    LA size 3.10 cm    Ascending aorta 3.15 cm    STJ 2.97 cm    LVIDs 3.32 2.1 - 4.0 cm    Ao root annulus 3.69 cm    Posterior Wall 1.09 0.6 - 1.1 cm    IVS 1.24 (A) 0.6 - 1.1 " cm    LVIDd 4.97 3.5 - 6.0 cm    PV PEAK VELOCITY 0.89 m/s    TDI LATERAL 0.09 m/s    Left Ventricular Outflow Tract Mean Gradient 1.95 mmHg    Left Ventricular Outflow Tract Mean Velocity 0.67 cm/s    TDI SEPTAL 0.09 m/s    LV LATERAL E/E' RATIO 13.00 m/s    LV SEPTAL E/E' RATIO 13.00 m/s    FS 33 28 - 44 %    LV mass 222.12 g    ZLVIDD -9.79     ZLVIDS -6.81     Left Ventricle Relative Wall Thickness 0.44 cm    AV valve area 3.43 cm²    AV Velocity Ratio 0.86     AV index (prosthetic) 0.78     E/A ratio 1.26     Mean e' 0.09 m/s    LVOT area 4.4 cm2    LVOT stroke volume 87.01 cm3    AV peak gradient 4 mmHg    E/E' ratio 13.00 m/s    LV Systolic Volume Index 18.1 mL/m2    LV Diastolic Volume Index 47.00 mL/m2    LV Mass Index 90 g/m2    Triscuspid Valve Regurgitation Peak Gradient 30 mmHg    CLARA by Velocity Ratio 3.77 cm²    BSA 2.55 m2    LA Volume Index 20.8 mL/m2    LA volume 51.48 cm3    LA WIDTH 3.9 cm    TV resting pulmonary artery pressure 33 mmHg    RV TB RVSP 6 mmHg    Est. RA pres 3 mmHg    Narrative      Left Ventricle: The left ventricle is normal in size. Moderately   increased wall thickness. There is concentric remodeling. Normal wall   motion. There is normal systolic function with a visually estimated   ejection fraction of 60 - 65%. Grade II diastolic dysfunction. Elevated   left ventricular filling pressure.    Right Ventricle: Normal right ventricular cavity size. Wall thickness   is normal. Right ventricle wall motion  is normal. Systolic function is   borderline low.    Tricuspid Valve: There is mild regurgitation.    Pulmonary Artery: The estimated pulmonary artery systolic pressure is   33 mmHg.    IVC/SVC: Normal venous pressure at 3 mmHg.     , EKG: Reviewed, and X-Ray: CXR: X-Ray Chest 1 View (CXR):   Results for orders placed or performed during the hospital encounter of 01/10/24   X-Ray Chest 1 View    Narrative    EXAMINATION:  XR CHEST 1 VIEW    CLINICAL  HISTORY:  sob;    COMPARISON:  01/10/2024    FINDINGS:  The size of the heart is normal.  There has been slight interval worsening of the appearance of the lungs.  There is a moderate amount of alveolar consolidation in both lungs.  There is no pneumothorax.  The costophrenic angles are sharp.      Impression    There has been slight interval worsening of the appearance of the lungs. There is a moderate amount of alveolar consolidation in both lungs. .      Electronically signed by: Brad Davis MD  Date:    01/12/2024  Time:    11:56    and X-Ray Chest PA and Lateral (CXR): No results found for this visit on 01/10/24.  Assessment and Plan:   Patient who presents with respiratory failure/CKD/CHF. Still appears volume overloaded. CXR worse, now on Vapotherm. Continue IV diuresis. Nephrology following.     * Acute kidney injury superimposed on CKD  -Mgmt as per nephrology      Acute hypoxic respiratory failure  -Continue IV diuresis  -Mgmt as per hospital medicine    CHF (congestive heart failure)  -Presents with decompensated CHF  -BNP improving but CT of chest/abdomen/pelvis still with small effusions and bilateral opacities (infectious vs non-infectious inflammation) and still appears overloaded  -Await nephrology rec's  -Continue BB, hydralazine as tolerated  -Strict I's/O's  -TTE pending    1/12/24  -TTE with normal EF, DD  -Still appears overloaded, CXR with worsening appearance, on Vapotherm this AM  -Continue IV diuresis  -Nephrology following    Type 2 diabetes mellitus  -Mgmt as per primary team    Normocytic anemia  -Mgmt as per primary team    Essential hypertension  -Continue Coreg as BP permits  -Continue hydralazine, amlodipine  -Monitor BP trend  -Mgmt as per primary team        VTE Risk Mitigation (From admission, onward)           Ordered     IP VTE LOW RISK PATIENT  Once         01/10/24 6937     Place sequential compression device  Until discontinued         01/10/24 0496                     Gillian Bird PA-C  Cardiology  [facility] - [facility] ([facility])

## 2024-01-13 PROBLEM — N18.9 ANEMIA IN CHRONIC KIDNEY DISEASE (CKD): Status: ACTIVE | Noted: 2019-07-27

## 2024-01-13 PROBLEM — D63.1 ANEMIA IN CHRONIC KIDNEY DISEASE (CKD): Status: ACTIVE | Noted: 2019-07-27

## 2024-01-13 NOTE — ASSESSMENT & PLAN NOTE
H&H again low this a.m.   No active signs of bleeding noted   Iron deficiency noted  Also likely anemia of chronic disease   Will start p.o. iron   EPO given   Transfuse 1 unit packed RBC

## 2024-01-13 NOTE — ASSESSMENT & PLAN NOTE
54 y/o male with CKD stage 5 presented with volume overload:     Stage 5 chronic kidney disease due to type 2 diabetes mellitus  Pt has baseline CKD stage 5  Review of the labs show that s Cr has been stable in the past 6 months at least  K normal  O2 sat good  Pt still responding to diuretics for fluid gain  Hemodynamically stable     Pt can be discharged after control of BP achieved to follow with his own nephrologist  Will discuss with hospitalist     CHF (congestive heart failure)  Reviewed the chart  Pulmonary edema  Good response to diuretics  Will monitor     Essential hypertension  BP not controled  Meds reviewed  Will increase hydralazine to 75 mg po tid     Type 2 diabetes mellitus  Long standing  Reviewed the chart     Plans and recommendations:  As discussed above  Total time spent 60 minutes including time needed to review the records, the   patient evaluation, documentation, face-to-face discussion with the patient,   more than 50% of the time was spent on coordination of care and counseling

## 2024-01-13 NOTE — ASSESSMENT & PLAN NOTE
Pt has baseline CKD stage 5  Review of the labs show that s Cr has been stable in the past 6 months at least  K normal  O2 sat good  Pt still responding to diuretics for fluid gain  Hemodynamically stable    Pt can be discharged after control of BP achieved to follow with his own nephrologist  Will discuss with hospitalist

## 2024-01-13 NOTE — PROGRESS NOTES
[facility])  Nephrology  Progress Note    Patient Name: Moody Silverio  MRN: [id number]  Admission Date: 1/10/2024  Hospital Length of Stay: 2 days  Attending Provider: Abhijeet Kennedy MD   Primary Care Physician: Jersey Nolan MD  Principal Problem:Acute kidney injury superimposed on CKD    Subjective:     HPI: noted    Interval History: Pt was seen and examined. Labs and meds reviewed. Discussed with other providers. Pt is a 52 y/o male with CKD stage 4 bordering on stage 5, DM, HTN, CHF, and obesity who was admitted for volume overload. Pt is pre-ESRD and follows with Dr. Ramirez. He has an AVF placed in R UE about 6 months ago, per pt. Pt has responded well to diuretics treatment, reports improved SOB.    Review of patient's allergies indicates:   Allergen Reactions    Ceftriaxone Itching     Current Facility-Administered Medications   Medication Frequency    amLODIPine tablet 10 mg Daily    atorvastatin tablet 40 mg Nightly    bumetanide injection 2 mg Q8H    carvediloL tablet 25 mg BID WM    clopidogreL tablet 75 mg Daily    dextrose 10% bolus 125 mL 125 mL PRN    dextrose 10% bolus 125 mL 125 mL PRN    dextrose 10% bolus 250 mL 250 mL PRN    dextrose 10% bolus 250 mL 250 mL PRN    doxycycline tablet 100 mg Q12H    DULoxetine DR capsule 60 mg Daily    fluticasone propionate 50 mcg/actuation nasal spray 100 mcg Daily    gabapentin capsule 300 mg TID    glucagon (human recombinant) injection 1 mg PRN    glucagon (human recombinant) injection 1 mg PRN    glucose chewable tablet 16 g PRN    glucose chewable tablet 16 g PRN    glucose chewable tablet 24 g PRN    glucose chewable tablet 24 g PRN    hydrALAZINE injection 10 mg Q6H PRN    hydrALAZINE tablet 50 mg TID    insulin aspart U-100 pen 0-10 Units QID (AC + HS) PRN    insulin detemir U-100 (Levemir) pen 10 Units Daily    mupirocin 2 % ointment BID    naloxone 0.4 mg/mL injection 0.02 mg PRN    sodium chloride 0.9% flush 10 mL Q12H PRN    tamsulosin  24 hr capsule 0.4 mg Daily    traZODone tablet 100 mg Nightly PRN       Objective:     Vital Signs (Most Recent):  Temp: 99 °F (37.2 °C) (01/12/24 1655)  Pulse: 91 (01/12/24 1700)  Resp: 18 (01/12/24 1655)  BP: (!) 188/88 (01/12/24 1655)  SpO2: 96 % (01/12/24 1655) Vital Signs (24h Range):  Temp:  [98 °F (36.7 °C)-99.8 °F (37.7 °C)] 99 °F (37.2 °C)  Pulse:  [74-92] 91  Resp:  [16-24] 18  SpO2:  [90 %-97 %] 96 %  BP: (153-189)/(71-88) 188/88     Weight: 126.1 kg (278 lb) (01/11/24 0935)  Body mass index is 36.68 kg/m².  Body surface area is 2.55 meters squared.    I/O last 3 completed shifts:  In: 480 [P.O.:480]  Out: 1700 [Urine:1700]     Physical Exam  Vitals and nursing note reviewed.   Constitutional:       General: He is not in acute distress.     Appearance: Normal appearance. He is obese. He is ill-appearing.   Cardiovascular:      Rate and Rhythm: Normal rate and regular rhythm.      Pulses: Normal pulses.      Heart sounds: Normal heart sounds.   Pulmonary:      Effort: Pulmonary effort is normal.      Breath sounds: Normal breath sounds.   Abdominal:      Palpations: Abdomen is soft.      Tenderness: There is no abdominal tenderness.   Musculoskeletal:      Right lower leg: Edema present.      Left lower leg: Edema present.   Neurological:      Mental Status: He is alert and oriented to person, place, and time.   Psychiatric:         Behavior: Behavior normal.          Significant Labs: reviewed  BMP  Lab Results   Component Value Date     01/12/2024    K 3.9 01/12/2024     01/12/2024    CO2 17 (L) 01/12/2024    BUN 97 (H) 01/12/2024    CREATININE 4.8 (H) 01/12/2024    CALCIUM 7.2 (L) 01/12/2024    ANIONGAP 14 01/12/2024    EGFRNORACEVR 14 (A) 01/12/2024     Lab Results   Component Value Date    WBC 8.81 01/12/2024    HGB 7.7 (L) 01/12/2024    HCT 24.5 (L) 01/12/2024    MCV 89 01/12/2024     01/12/2024       Lab Results   Component Value Date    CALCIUM 7.2 (L) 01/12/2024    PHOS 4.7 (H)  01/12/2024         Significant Imaging: reviewed CXR, pulm edema  Assessment/Plan:     52 y/o male with CKD stage 5 presented with volume overload:    Stage 5 chronic kidney disease due to type 2 diabetes mellitus  Pt has baseline CKD stage 5  Review of the labs show that s Cr has been stable in the past 6 months at least  K normal  O2 sat good  Pt still responding to diuretics for fluid gain  Hemodynamically stable    Pt can be discharged after control of BP achieved to follow with his own nephrologist  Will discuss with hospitalist    CHF (congestive heart failure)  Reviewed the chart  Pulmonary edema  Good response to diuretics  Will monitor    Essential hypertension  BP not controled  Meds reviewed  Will increase hydralazine to 75 mg po tid    Type 2 diabetes mellitus  Long standing  Reviewed the chart    Plans and recommendations:  As discussed above  Total time spent 60 minutes including time needed to review the records, the   patient evaluation, documentation, face-to-face discussion with the patient,   more than 50% of the time was spent on coordination of care and counseling.    Level V visit.      Thank you for your consult.     Laya Bartlett MD  Nephrology  [facility] - [facility] ([facility])

## 2024-01-13 NOTE — ASSESSMENT & PLAN NOTE
Patient with Hypoxic Respiratory failure which is Acute.  he is not on home oxygen. Supplemental oxygen was provided and noted- Oxygen Concentration (%):  [60-90] 60    .   Signs/symptoms of respiratory failure include- tachypnea, increased work of breathing, respiratory distress, and use of accessory muscles. Contributing diagnoses includes - CHF Labs and images were reviewed. Patient Has not had a recent ABG. Will treat underlying causes and adjust management of respiratory failure as follows-     1/13:   Patient on Vapotherm 30 L 60% FiO2   Chest x-ray showed worsening  Continue with diuresis   Will treat empirically for pneumonia   Continue IV Levaquin  Sputum culture pending

## 2024-01-13 NOTE — ASSESSMENT & PLAN NOTE
Patient's FSGs are uncontrolled due to hyperglycemia on current medication regimen.  Last A1c reviewed-   Lab Results   Component Value Date    HGBA1C 6.2 (H) 01/10/2024     Most recent fingerstick glucose reviewed-   Recent Labs   Lab 01/12/24  1200 01/12/24  1654 01/12/24  1934 01/13/24  0358   POCTGLUCOSE 211* 210* 274* 213*       Current correctional scale  Medium  Maintain anti-hyperglycemic dose as follows-   Antihyperglycemics (From admission, onward)      Start     Stop Route Frequency Ordered    01/13/24 0900  insulin detemir U-100 (Levemir) pen 14 Units         -- SubQ Daily 01/13/24 0726    01/10/24 1728  insulin aspart U-100 pen 0-10 Units         -- SubQ Before meals & nightly PRN 01/10/24 1629          Hold Oral hypoglycemics while patient is in the hospital.    1/13   Hyperglycemia noted   Will increase Levemir to 14 units   Continue sliding scale

## 2024-01-13 NOTE — SUBJECTIVE & OBJECTIVE
Interval History:  Patient denies any issues overnight.    Review of Systems   Constitutional:  Negative for fatigue and fever.   HENT:  Negative for sinus pressure.    Eyes:  Negative for visual disturbance.   Respiratory:  Positive for shortness of breath.    Cardiovascular:  Positive for leg swelling. Negative for chest pain.   Gastrointestinal:  Negative for nausea and vomiting.   Genitourinary:  Negative for difficulty urinating.   Musculoskeletal:  Negative for back pain.   Skin:  Negative for rash.   Neurological:  Negative for headaches.   Psychiatric/Behavioral:  Negative for confusion.      Objective:     Vital Signs (Most Recent):  Temp: 97.5 °F (36.4 °C) (01/13/24 0801)  Pulse: 83 (01/13/24 0801)  Resp: 20 (01/13/24 0801)  BP: (!) 172/78 (01/13/24 0801)  SpO2: (!) 91 % (01/13/24 0801) Vital Signs (24h Range):  Temp:  [97.5 °F (36.4 °C)-99.8 °F (37.7 °C)] 97.5 °F (36.4 °C)  Pulse:  [74-92] 83  Resp:  [18-24] 20  SpO2:  [90 %-97 %] 91 %  BP: (162-189)/(67-88) 172/78     Weight: 125.3 kg (276 lb 3.8 oz)  Body mass index is 36.44 kg/m².    Intake/Output Summary (Last 24 hours) at 1/13/2024 0843  Last data filed at 1/13/2024 0815  Gross per 24 hour   Intake 990 ml   Output 4516 ml   Net -3526 ml         Physical Exam  Constitutional:       General: He is not in acute distress.     Appearance: He is well-developed. He is obese. He is ill-appearing. He is not diaphoretic.   HENT:      Head: Normocephalic and atraumatic.   Eyes:      Pupils: Pupils are equal, round, and reactive to light.   Cardiovascular:      Rate and Rhythm: Normal rate and regular rhythm.      Heart sounds: Normal heart sounds. No murmur heard.     No friction rub. No gallop.   Pulmonary:      Effort: Pulmonary effort is normal. No respiratory distress.      Breath sounds: No stridor. Rales present. No wheezing.   Abdominal:      General: Bowel sounds are normal. There is no distension.      Palpations: Abdomen is soft. There is no mass.       Tenderness: There is no abdominal tenderness. There is no guarding.   Musculoskeletal:      Right lower leg: Edema present.      Left lower leg: Edema present.   Skin:     General: Skin is warm.      Findings: No erythema.   Neurological:      Mental Status: He is alert and oriented to person, place, and time.             Significant Labs: All pertinent labs within the past 24 hours have been reviewed.    Significant Imaging: I have reviewed all pertinent imaging results/findings within the past 24 hours.

## 2024-01-13 NOTE — PROGRESS NOTES
"[facility]  Progress Note    Patient Name: Moody Silverio  MRN: [id number]  Patient Class: IP- Inpatient   Admission Date: 1/10/2024  Length of Stay: 3 days  Attending Physician: Abhijeet Kennedy MD  Primary Care Provider: Jersey Nolan MD        Subjective:     Principal Problem:Acute kidney injury superimposed on CKD        HPI:  Moody Silverio is 52 y/o male with PMHx IDDM, HTN, CKD, Hx of CVA with right hemiparesis, anemia of chronic disease, BPH, chronic back pain, anxiety who presented to [facility] on 1-8-[facility] with c/o SOB due to volume overload/CHF exacerbation and pneumonia and also acute on CKD. History obtained from records sent from outside hospital, patient, and family at bedside. Patient remained at outside hospital ER till transfer to [facility] today (1-10-24). Patient arrived to [facility] with Valera in place and on supplemental O2. Patient with right forearm AV shunt. Patient reports continued SOB, constipation, abdominal distention, fatigue.    Outside hospital ER labs/imaging reviewed:  WBC 11  BUN/Cr 77/4.8 (1/8/24); Cr 4.55    H/H 8.6/27.4  CXR with bilateral upper and lower lobe patchy hazy pulmonary opacities either edema or pneumonia (1/9/24)  CT Chest "bilateral pneumonia with an underlying component of volume overload." (1/8/24)    ER course reviewed: patient was initially placed on BiPAP, transitioned to NC (2-3 L), and also required Valera cathter placement due to urinary outlet obstruction. He was treated with pneumonia and CHF exacerbation, received ceftriaxone, azithromycin, doxycycline, DuoNebs, Bumex IV, Lasix IV. Also noted he developed reaction to ceftriaxone, listed as allergy[facility] accepted as [facility] to [facility] on 1-8-24.    Overview/Hospital Course:  1/11:  Nephrology and cardiology on case.  Recommend Bumex 2 mg q.8h.  Will continue p.o. doxycycline.  " Wean O2 as tolerated.  Echo showed normal EF.  Likely diastolic heart failure exacerbation.  1/12:  Patient requiring high-flow Vapotherm today, will repeat stat chest x-ray.  Continue with diuresis.  Anemia noted, likely to CKD.  Will check iron studies.  Consider EPO and iron replacement.  1/13:  Patient currently on 30 L 60% FiO2.  Continue with diuresis.  Start IV Levaquin for pneumonia.  Anemia again noted, will transfuse 1 unit packed RBCs.  Likely secondary to CKD and iron deficiency.  Start iron replacement therapy.  EPO given.    Interval History:  Patient denies any issues overnight.    Review of Systems   Constitutional:  Negative for fatigue and fever.   HENT:  Negative for sinus pressure.    Eyes:  Negative for visual disturbance.   Respiratory:  Positive for shortness of breath.    Cardiovascular:  Positive for leg swelling. Negative for chest pain.   Gastrointestinal:  Negative for nausea and vomiting.   Genitourinary:  Negative for difficulty urinating.   Musculoskeletal:  Negative for back pain.   Skin:  Negative for rash.   Neurological:  Negative for headaches.   Psychiatric/Behavioral:  Negative for confusion.      Objective:     Vital Signs (Most Recent):  Temp: 97.5 °F (36.4 °C) (01/13/24 0801)  Pulse: 83 (01/13/24 0801)  Resp: 20 (01/13/24 0801)  BP: (!) 172/78 (01/13/24 0801)  SpO2: (!) 91 % (01/13/24 0801) Vital Signs (24h Range):  Temp:  [97.5 °F (36.4 °C)-99.8 °F (37.7 °C)] 97.5 °F (36.4 °C)  Pulse:  [74-92] 83  Resp:  [18-24] 20  SpO2:  [90 %-97 %] 91 %  BP: (162-189)/(67-88) 172/78     Weight: 125.3 kg (276 lb 3.8 oz)  Body mass index is 36.44 kg/m².    Intake/Output Summary (Last 24 hours) at 1/13/2024 0843  Last data filed at 1/13/2024 0815  Gross per 24 hour   Intake 990 ml   Output 4516 ml   Net -3526 ml         Physical Exam  Constitutional:       General: He is not in acute distress.     Appearance: He is well-developed. He is obese. He is ill-appearing. He is not diaphoretic.    HENT:      Head: Normocephalic and atraumatic.   Eyes:      Pupils: Pupils are equal, round, and reactive to light.   Cardiovascular:      Rate and Rhythm: Normal rate and regular rhythm.      Heart sounds: Normal heart sounds. No murmur heard.     No friction rub. No gallop.   Pulmonary:      Effort: Pulmonary effort is normal. No respiratory distress.      Breath sounds: No stridor. Rales present. No wheezing.   Abdominal:      General: Bowel sounds are normal. There is no distension.      Palpations: Abdomen is soft. There is no mass.      Tenderness: There is no abdominal tenderness. There is no guarding.   Musculoskeletal:      Right lower leg: Edema present.      Left lower leg: Edema present.   Skin:     General: Skin is warm.      Findings: No erythema.   Neurological:      Mental Status: He is alert and oriented to person, place, and time.             Significant Labs: All pertinent labs within the past 24 hours have been reviewed.    Significant Imaging: I have reviewed all pertinent imaging results/findings within the past 24 hours.      Assessment/Plan:      * Acute kidney injury superimposed on CKD  Patient with acute kidney injury/acute renal failure likely due to post-obstructive d/t bladder outlet obstruction  ELMIRA is currently worsening. Baseline creatinine unknown - Labs reviewed- Renal function/electrolytes with Estimated Creatinine Clearance: 24.1 mL/min (A) (based on SCr of 4.8 mg/dL (H)). according to latest data. Monitor urine output and serial BMP and adjust therapy as needed. Avoid nephrotoxins and renally dose meds for GFR listed above.    Hold further IV diuretics for now  Consult Nephrology  Strict I/O's, avoid nephrotoxins  May require HD, has right AVF since Sept '23.    1/13:   Nephrology on case  Continue to monitor renal functions  Continue Bumex    Acute hypoxic respiratory failure  Patient with Hypoxic Respiratory failure which is Acute.  he is not on home oxygen. Supplemental  oxygen was provided and noted- Oxygen Concentration (%):  [60-90] 60    .   Signs/symptoms of respiratory failure include- tachypnea, increased work of breathing, respiratory distress, and use of accessory muscles. Contributing diagnoses includes - CHF Labs and images were reviewed. Patient Has not had a recent ABG. Will treat underlying causes and adjust management of respiratory failure as follows-     1/13:   Patient on Vapotherm 30 L 60% FiO2   Chest x-ray showed worsening  Continue with diuresis   Will treat empirically for pneumonia   Continue IV Levaquin  Sputum culture pending         CHF (congestive heart failure)  Patient is identified as having  unknown, TTE pending  heart failure that is Acute. CHF is currently uncontrolled due to Rales/crackles on pulmonary exam and Pulmonary edema/pleural effusion on CXR. Latest ECHO performed and demonstrates- No results found for this or any previous visit.  .  Monitor strict Is&Os and daily weights.    Place on fluid restriction of 1.5 L.   Last BNP reviewed- and noted below   Recent Labs   Lab 01/10/24  1631   *       TTE pending  Consult Cardiology    1/13:   Cardiology on case   Echo reviewed   Normal EF   Likely diastolic heart failure exacerbation  Patient still appears overloaded   Will continue Bumex 2 mg q.8h    Benign prostatic hyperplasia with urinary obstruction  Valera in place on arrival, placed at [facility]  CT C/A/P pending  Consider Urology consult pending CT results  Strict I/O's      Type 2 diabetes mellitus  Patient's FSGs are uncontrolled due to hyperglycemia on current medication regimen.  Last A1c reviewed-   Lab Results   Component Value Date    HGBA1C 6.2 (H) 01/10/2024     Most recent fingerstick glucose reviewed-   Recent Labs   Lab 01/12/24  1200 01/12/24  1654 01/12/24  1934 01/13/24  0358   POCTGLUCOSE 211* 210* 274* 213*       Current correctional scale  Medium  Maintain anti-hyperglycemic dose as follows-   Antihyperglycemics  (From admission, onward)      Start     Stop Route Frequency Ordered    01/13/24 0900  insulin detemir U-100 (Levemir) pen 14 Units         -- SubQ Daily 01/13/24 0726    01/10/24 1728  insulin aspart U-100 pen 0-10 Units         -- SubQ Before meals & nightly PRN 01/10/24 1629          Hold Oral hypoglycemics while patient is in the hospital.    1/13   Hyperglycemia noted   Will increase Levemir to 14 units   Continue sliding scale    Anemia in chronic kidney disease (CKD)  H&H again low this a.m.   No active signs of bleeding noted   Iron deficiency noted  Also likely anemia of chronic disease   Will start p.o. iron   EPO given   Transfuse 1 unit packed RBC    Essential hypertension  Chronic, controlled. Latest blood pressure and vitals reviewed-     Temp:  [98.2 °F (36.8 °C)]   Pulse:  [85-92]   Resp:  [18-22]   BP: (174-180)/(73-75)   SpO2:  [95 %-96 %] .   Home meds for hypertension were reviewed and noted below.   Hypertension Medications               amLODIPine (NORVASC) 10 MG tablet Take 10 mg by mouth once daily.    carvediloL (COREG) 25 MG tablet Take 25 mg by mouth 2 (two) times daily.    furosemide (LASIX) 80 MG tablet Take 80 mg by mouth 2 (two) times daily.    hydrALAZINE (APRESOLINE) 50 MG tablet Take 50 mg by mouth 3 (three) times daily.            While in the hospital, will manage blood pressure as follows; Continue home antihypertensive regimen    Will utilize p.r.n. blood pressure medication only if patient's blood pressure greater than 180/110 and he develops symptoms such as worsening chest pain or shortness of breath.      VTE Risk Mitigation (From admission, onward)           Ordered     IP VTE LOW RISK PATIENT  Once         01/10/24 1557     Place sequential compression device  Until discontinued         01/10/24 1557                    Discharge Planning   ALONDRA:      Code Status: Full Code   Is the patient medically ready for discharge?:     Reason for patient still in hospital (select all  that apply): Patient trending condition  Discharge Plan A: Home Health                  Abhijeet Kennedy MD  Department of Hospital Medicine   [facility] - Telemetry ([facility])

## 2024-01-13 NOTE — PROGRESS NOTES
[facility] - Telemetry ([facility])  Cardiology  Progress Note     Patient Name: Moody Silverio  MRN: [id number]  Admission Date: 1/10/2024  Hospital Length of Stay: 2 days  Code Status: Full Code   Attending Physician: Abhijeet Kennedy MD   Primary Care Physician: Jersey Nolan MD  Expected Discharge Date:   Principal Problem:Acute kidney injury superimposed on CKD     Subjective:   HPI:  Mr. Silverio is a 53 year old male patient whose current medical conditions include IDDM, HTN, CKD, prior CVA with residual right-sided hemiparesis, anemia of chronic disease, and anxiety who initially presented to [facility] on 1/8/24 due to SOB/CHF exacerbation, PNA, and CKD. Initial workup revealed BNP of 419, creatinine of 4.55, and anemia (H/H 8.6/27.4). Patient initially required bipap placement but improved after being treated with IV Lasix. He was also treated with abx and nebulizers. Transfer requested for nephrology consultation. Cardiology consulted to assist with management. Patient seen and examined today, resting in bed. Feels ok. Still SOB, on supplemental O2. Denies any current CP symptoms  Chart reviewed. Pro-meredith +. BNP improved to 240. Creatinine 4.7. Repeat CT of chest/abd/pelvis showed small bilateral effusions and multifocal pulmonary opacities possibly related to infection or non-infectious inflammation. TTE pending. Nephrology consulted, await rec's.              Hospital Course:   1/12/24-Patient seen and examined today. More SOB, on Vapotherm. No CP. Started on IV Bumex by nephrology yesterday, still appears overloaded. CXR with worsening appearance. Labs reviewed. Creatinine 4.8. H/H 7.7/24.5.     1/13/24- Patient seen and examined today. Continue diueris with bumex. Output of 5 liters. Bp was elevated. Added Imdur. Increased hydralazine.      Review of Systems   Constitutional: Positive for malaise/fatigue.   HENT: Negative.     Eyes: Negative.    Cardiovascular:  Positive for dyspnea on exertion and  leg swelling.   Respiratory:  Positive for shortness of breath.    Endocrine: Negative.    Hematologic/Lymphatic: Negative.    Skin: Negative.    Musculoskeletal: Negative.    Gastrointestinal: Negative.    Genitourinary: Negative.    Neurological:  Positive for weakness.   Psychiatric/Behavioral: Negative.     Allergic/Immunologic: Negative.       Objective:      Vital Signs (Most Recent):  Temp: 99.8 °F (37.7 °C) (01/12/24 1230)  Pulse: 88 (01/12/24 1230)  Resp: 18 (01/12/24 1230)  BP: (!) 173/79 (01/12/24 1230)  SpO2: 95 % (01/12/24 1230) Vital Signs (24h Range):  Temp:  [98 °F (36.7 °C)-99.8 °F (37.7 °C)] 99.8 °F (37.7 °C)  Pulse:  [74-88] 88  Resp:  [16-24] 18  SpO2:  [90 %-98 %] 95 %  BP: (153-177)/(71-79) 173/79      Weight: 126.1 kg (278 lb)  Body mass index is 36.68 kg/m².     SpO2: 95 %           Intake/Output Summary (Last 24 hours) at 1/12/2024 1231  Last data filed at 1/12/2024 1042      Gross per 24 hour   Intake 960 ml   Output 1400 ml   Net -440 ml         Lines/Drains/Airways         Drain  Duration                     Urethral Catheter 01/11/24 1900 16 Fr. <1 day                  Peripheral Intravenous Line  Duration                     Hemodialysis AV Fistula Right forearm -- days          Peripheral IV - Single Lumen 01/11/24 1620 22 G Anterior;Left Hand <1 day                             Physical Exam  Vitals and nursing note reviewed.   Constitutional:       General: He is not in acute distress.     Appearance: He is well-developed. He is ill-appearing. He is not diaphoretic.      Comments: ON vapotherm   HENT:      Head: Normocephalic and atraumatic.   Eyes:      General:         Right eye: No discharge.         Left eye: No discharge.      Pupils: Pupils are equal, round, and reactive to light.   Cardiovascular:      Rate and Rhythm: Normal rate and regular rhythm.      Heart sounds: Normal heart sounds, S1 normal and S2 normal. No murmur heard.  Pulmonary:      Breath sounds: Rhonchi and  rales present. No wheezing.   Abdominal:      General: There is no distension.   Musculoskeletal:      Right lower leg: Edema present.      Left lower leg: Edema present.   Skin:     General: Skin is warm and dry.      Findings: No erythema.   Neurological:      General: No focal deficit present.      Mental Status: He is alert and oriented to person, place, and time.   Psychiatric:         Mood and Affect: Mood normal.         Behavior: Behavior normal.         Thought Content: Thought content normal.               Significant Labs: All pertinent lab results from the last 24 hours have been reviewed.     Significant Imaging: All pertinent imaging results from the last 24 hours have been reviewed.    Assessment and Plan:        * Acute kidney injury superimposed on CKD  -Mgmt as per nephrology        Acute hypoxic respiratory failure  -Continue IV diuresis  -Mgmt as per hospital medicine     CHF (congestive heart failure)  -Presents with decompensated CHF  -BNP improving but CT of chest/abdomen/pelvis still with small effusions and bilateral opacities (infectious vs non-infectious inflammation) and still appears overloaded  -Await nephrology rec's  -Continue BB, hydralazine as tolerated  -Strict I's/O's  -TTE pending     1/12/24  -TTE with normal EF, DD  -Still appears overloaded, CXR with worsening appearance, on Vapotherm this AM  -Continue IV diuresis  -Nephrology following    1/1/3/24  -Continue IV diuersis   - Add imdur and increase hydralazine   -BP elevated     Type 2 diabetes mellitus  -Mgmt as per primary team     Normocytic anemia  -Mgmt as per primary team     Essential hypertension  -Continue Coreg as BP permits  -Continue hydralazine, amlodipine  -Monitor BP trend  -Mgmt as per primary team           VTE Risk Mitigation (From admission, onward)              Ordered       IP VTE LOW RISK PATIENT  Once         01/10/24 1557       Place sequential compression device  Until discontinued         01/10/24  9659                          Dusty Gonsalez MD   Cardiology  [facility] - Telemetry ([facility])  SCRIBE #1 NOTE: I, Tuyet Keller, am scribing for, and in the presence of,  Dusty Gonsalez. I have scribed the entire note.        I have seen the patient, personally interviewed, and examined the patient at bedside.  I have performed the substantive portion of the visit and formulated the assessment and plan as documented by scribe.     Dusty Gonsalez MD, [facility]  Cardiovascular Disease  [facility], [place]  [phone] (P)

## 2024-01-13 NOTE — SUBJECTIVE & OBJECTIVE
Interval History: Pt was seen and examined. Labs and meds reviewed. Discussed with other providers. Pt is a 52 y/o male with CKD stage 4 bordering on stage 5, DM, HTN, CHF, and obesity who was admitted for volume overload. Pt is pre-ESRD and follows with Dr. Ramirez. He has an AVF placed in R UE about 6 months ago, per pt. Pt has responded well to diuretics treatment, reports improved SOB.    Review of patient's allergies indicates:   Allergen Reactions    Ceftriaxone Itching     Current Facility-Administered Medications   Medication Frequency    amLODIPine tablet 10 mg Daily    atorvastatin tablet 40 mg Nightly    bumetanide injection 2 mg Q8H    carvediloL tablet 25 mg BID WM    clopidogreL tablet 75 mg Daily    dextrose 10% bolus 125 mL 125 mL PRN    dextrose 10% bolus 125 mL 125 mL PRN    dextrose 10% bolus 250 mL 250 mL PRN    dextrose 10% bolus 250 mL 250 mL PRN    doxycycline tablet 100 mg Q12H    DULoxetine DR capsule 60 mg Daily    fluticasone propionate 50 mcg/actuation nasal spray 100 mcg Daily    gabapentin capsule 300 mg TID    glucagon (human recombinant) injection 1 mg PRN    glucagon (human recombinant) injection 1 mg PRN    glucose chewable tablet 16 g PRN    glucose chewable tablet 16 g PRN    glucose chewable tablet 24 g PRN    glucose chewable tablet 24 g PRN    hydrALAZINE injection 10 mg Q6H PRN    hydrALAZINE tablet 50 mg TID    insulin aspart U-100 pen 0-10 Units QID (AC + HS) PRN    insulin detemir U-100 (Levemir) pen 10 Units Daily    mupirocin 2 % ointment BID    naloxone 0.4 mg/mL injection 0.02 mg PRN    sodium chloride 0.9% flush 10 mL Q12H PRN    tamsulosin 24 hr capsule 0.4 mg Daily    traZODone tablet 100 mg Nightly PRN       Objective:     Vital Signs (Most Recent):  Temp: 99 °F (37.2 °C) (01/12/24 1655)  Pulse: 91 (01/12/24 1700)  Resp: 18 (01/12/24 1655)  BP: (!) 188/88 (01/12/24 1655)  SpO2: 96 % (01/12/24 1655) Vital Signs (24h Range):  Temp:  [98 °F (36.7 °C)-99.8 °F (37.7 °C)]  99 °F (37.2 °C)  Pulse:  [74-92] 91  Resp:  [16-24] 18  SpO2:  [90 %-97 %] 96 %  BP: (153-189)/(71-88) 188/88     Weight: 126.1 kg (278 lb) (01/11/24 0935)  Body mass index is 36.68 kg/m².  Body surface area is 2.55 meters squared.    I/O last 3 completed shifts:  In: 480 [P.O.:480]  Out: 1700 [Urine:1700]     Physical Exam  Vitals and nursing note reviewed.   Constitutional:       General: He is not in acute distress.     Appearance: Normal appearance. He is obese. He is ill-appearing.   Cardiovascular:      Rate and Rhythm: Normal rate and regular rhythm.      Pulses: Normal pulses.      Heart sounds: Normal heart sounds.   Pulmonary:      Effort: Pulmonary effort is normal.      Breath sounds: Normal breath sounds.   Abdominal:      Palpations: Abdomen is soft.      Tenderness: There is no abdominal tenderness.   Musculoskeletal:      Right lower leg: Edema present.      Left lower leg: Edema present.   Neurological:      Mental Status: He is alert and oriented to person, place, and time.   Psychiatric:         Behavior: Behavior normal.          Significant Labs: reviewed  BMP  Lab Results   Component Value Date     01/12/2024    K 3.9 01/12/2024     01/12/2024    CO2 17 (L) 01/12/2024    BUN 97 (H) 01/12/2024    CREATININE 4.8 (H) 01/12/2024    CALCIUM 7.2 (L) 01/12/2024    ANIONGAP 14 01/12/2024    EGFRNORACEVR 14 (A) 01/12/2024     Lab Results   Component Value Date    WBC 8.81 01/12/2024    HGB 7.7 (L) 01/12/2024    HCT 24.5 (L) 01/12/2024    MCV 89 01/12/2024     01/12/2024       Lab Results   Component Value Date    CALCIUM 7.2 (L) 01/12/2024    PHOS 4.7 (H) 01/12/2024         Significant Imaging: reviewed CXR, pulm edema

## 2024-01-13 NOTE — ASSESSMENT & PLAN NOTE
Patient is identified as having  unknown, TTE pending  heart failure that is Acute. CHF is currently uncontrolled due to Rales/crackles on pulmonary exam and Pulmonary edema/pleural effusion on CXR. Latest ECHO performed and demonstrates- No results found for this or any previous visit.  .  Monitor strict Is&Os and daily weights.    Place on fluid restriction of 1.5 L.   Last BNP reviewed- and noted below   Recent Labs   Lab 01/10/24  1631   *       TTE pending  Consult Cardiology    1/13:   Cardiology on case   Echo reviewed   Normal EF   Likely diastolic heart failure exacerbation  Patient still appears overloaded   Will continue Bumex 2 mg q.8h

## 2024-01-13 NOTE — PLAN OF CARE
Problem: Adult Inpatient Plan of Care  Goal: Plan of Care Review  Outcome: Ongoing, Progressing  Goal: Patient-Specific Goal (Individualized)  Outcome: Ongoing, Progressing  Goal: Absence of Hospital-Acquired Illness or Injury  Outcome: Ongoing, Progressing  Goal: Optimal Comfort and Wellbeing  Outcome: Ongoing, Progressing  Goal: Readiness for Transition of Care  Outcome: Ongoing, Progressing     Problem: Diabetes Comorbidity  Goal: Blood Glucose Level Within Targeted Range  Outcome: Ongoing, Progressing     Problem: Fluid and Electrolyte Imbalance (Acute Kidney Injury/Impairment)  Goal: Fluid and Electrolyte Balance  Outcome: Ongoing, Progressing     Problem: Oral Intake Inadequate (Acute Kidney Injury/Impairment)  Goal: Optimal Nutrition Intake  Outcome: Ongoing, Progressing     Problem: Renal Function Impairment (Acute Kidney Injury/Impairment)  Goal: Effective Renal Function  Outcome: Ongoing, Progressing     Problem: Skin Injury Risk Increased  Goal: Skin Health and Integrity  Outcome: Ongoing, Progressing     Problem: Impaired Wound Healing  Goal: Optimal Wound Healing  Outcome: Ongoing, Progressing     Problem: Infection  Goal: Absence of Infection Signs and Symptoms  Outcome: Ongoing, Progressing

## 2024-01-13 NOTE — ASSESSMENT & PLAN NOTE
Patient with acute kidney injury/acute renal failure likely due to post-obstructive d/t bladder outlet obstruction  ELMIRA is currently worsening. Baseline creatinine unknown - Labs reviewed- Renal function/electrolytes with Estimated Creatinine Clearance: 24.1 mL/min (A) (based on SCr of 4.8 mg/dL (H)). according to latest data. Monitor urine output and serial BMP and adjust therapy as needed. Avoid nephrotoxins and renally dose meds for GFR listed above.    Hold further IV diuretics for now  Consult Nephrology  Strict I/O's, avoid nephrotoxins  May require HD, has right AVF since Sept '23.    1/13:   Nephrology on case  Continue to monitor renal functions  Continue Bumex

## 2024-01-13 NOTE — SUBJECTIVE & OBJECTIVE
Interval History: Pt was seen and examined. Labs and meds reviewed. Discussed with other providers. No new events, has no c/o's. No SOB.      Review of patient's allergies indicates:   Allergen Reactions    Ceftriaxone Itching and Swelling     Current Facility-Administered Medications   Medication Frequency    0.9%  NaCl infusion (for blood administration) Q24H PRN    amLODIPine tablet 10 mg Daily    atorvastatin tablet 40 mg Nightly    bumetanide tablet 2 mg Q12H    carvediloL tablet 25 mg BID WM    cloNIDine tablet 0.1 mg BID    clopidogreL tablet 75 mg Daily    dextrose 10% bolus 125 mL 125 mL PRN    dextrose 10% bolus 125 mL 125 mL PRN    dextrose 10% bolus 250 mL 250 mL PRN    dextrose 10% bolus 250 mL 250 mL PRN    DULoxetine DR capsule 60 mg Daily    ferrous sulfate tablet 1 each Daily    fluticasone propionate 50 mcg/actuation nasal spray 100 mcg Daily    gabapentin capsule 300 mg TID    glucagon (human recombinant) injection 1 mg PRN    glucagon (human recombinant) injection 1 mg PRN    glucose chewable tablet 16 g PRN    glucose chewable tablet 16 g PRN    glucose chewable tablet 24 g PRN    glucose chewable tablet 24 g PRN    hydrALAZINE injection 10 mg Q6H PRN    hydrALAZINE tablet 100 mg TID    insulin aspart U-100 pen 0-10 Units QID (AC + HS) PRN    insulin detemir U-100 (Levemir) pen 14 Units Daily    isosorbide mononitrate 24 hr tablet 60 mg Daily    levoFLOXacin 750 mg/150 mL IVPB 750 mg Q48H    mupirocin 2 % ointment BID    naloxone 0.4 mg/mL injection 0.02 mg PRN    sodium chloride 0.9% flush 10 mL Q12H PRN    tamsulosin 24 hr capsule 0.4 mg Daily    traZODone tablet 100 mg Nightly PRN       Objective:     Vital Signs (Most Recent):  Temp: 97.5 °F (36.4 °C) (01/13/24 0801)  Pulse: 89 (01/13/24 1207)  Resp: 16 (01/13/24 1207)  BP: (!) 173/81 (01/13/24 1207)  SpO2: (!) 89 % (01/13/24 1207) Vital Signs (24h Range):  Temp:  [97.5 °F (36.4 °C)-99.1 °F (37.3 °C)] 97.5 °F (36.4 °C)  Pulse:  [83-92]  89  Resp:  [16-22] 16  SpO2:  [89 %-97 %] 89 %  BP: (162-189)/(67-88) 173/81     Weight: 125.3 kg (276 lb 3.8 oz) (01/13/24 0402)  Body mass index is 36.44 kg/m².  Body surface area is 2.54 meters squared.    I/O last 3 completed shifts:  In: 1470 [P.O.:1470]  Out: 5500 [Urine:5500]     Physical Exam  Vitals and nursing note reviewed.   Constitutional:       Appearance: Normal appearance.   Cardiovascular:      Rate and Rhythm: Normal rate and regular rhythm.      Pulses: Normal pulses.      Heart sounds: Normal heart sounds.   Pulmonary:      Effort: Pulmonary effort is normal.      Breath sounds: No rales.   Abdominal:      Palpations: Abdomen is soft.      Tenderness: There is no abdominal tenderness.   Musculoskeletal:      Right lower leg: Edema present.      Left lower leg: Edema present.   Neurological:      Mental Status: He is alert and oriented to person, place, and time.   Psychiatric:         Behavior: Behavior normal.          Significant Labs: reviewed  BMP  Lab Results   Component Value Date     (L) 01/13/2024    K 3.7 01/13/2024     01/13/2024    CO2 21 (L) 01/13/2024    BUN 89 (H) 01/13/2024    CREATININE 4.8 (H) 01/13/2024    CALCIUM 7.7 (L) 01/13/2024    ANIONGAP 10 01/13/2024    EGFRNORACEVR 14 (A) 01/13/2024     Lab Results   Component Value Date    WBC 11.42 01/13/2024    HGB 7.4 (L) 01/13/2024    HCT 23.4 (L) 01/13/2024    MCV 88 01/13/2024     01/13/2024     Lab Results   Component Value Date    CALCIUM 7.7 (L) 01/13/2024    PHOS 4.6 (H) 01/13/2024     Lab Results   Component Value Date    IRON 13 (L) 01/12/2024    TRANSFERRIN 123 (L) 01/12/2024    TIBC 182 (L) 01/12/2024    FESATURATED 7 (L) 01/12/2024          Significant Imaging: reviewed CXR

## 2024-01-13 NOTE — PROGRESS NOTES
Pharmacist Renal Dose Adjustment Note    Moody Silverio is a 53 y.o. male being treated with the medication levofloxacin.     Patient Data:    Vital Signs (Most Recent):  Temp: 98.6 °F (37 °C) (01/13/24 0402)  Pulse: 85 (01/13/24 0412)  Resp: 20 (01/13/24 0402)  BP: (!) 171/78 (01/13/24 0402)  SpO2: (!) 94 % (01/13/24 0402) Vital Signs (72h Range):  Temp:  [97.2 °F (36.2 °C)-99.8 °F (37.7 °C)]   Pulse:  [50-92]   Resp:  [15-24]   BP: (153-189)/(67-88)   SpO2:  [88 %-98 %]      Recent Labs   Lab 01/10/24  1631 01/11/24  0551 01/12/24  0612   CREATININE 4.8* 4.7* 4.8*     Serum creatinine: 4.8 mg/dL (H) 01/12/24 0612  Estimated creatinine clearance: 24.1 mL/min (A)    Levofloxacin 750 mg IV once, then 500 mg IV every 48 hours will be changed to levofloxacin 750 mg IV every 48 hours per renal dose protocol for CrCl 20-49 ml/min.     Thank you,   Pharmacist's Name: Silverio Kauffman

## 2024-01-14 NOTE — SUBJECTIVE & OBJECTIVE
Interval History:  No acute issues overnight.  Patient reports shortness of breath appears to be improving.    Review of Systems   Constitutional:  Negative for fatigue and fever.   HENT:  Negative for sinus pressure.    Eyes:  Negative for visual disturbance.   Respiratory:  Positive for shortness of breath (Improving).    Cardiovascular:  Positive for leg swelling. Negative for chest pain.   Gastrointestinal:  Negative for nausea and vomiting.   Genitourinary:  Negative for difficulty urinating.   Musculoskeletal:  Negative for back pain.   Skin:  Negative for rash.   Neurological:  Negative for headaches.   Psychiatric/Behavioral:  Negative for confusion.      Objective:     Vital Signs (Most Recent):  Temp: 98.6 °F (37 °C) (01/14/24 0921)  Pulse: 83 (01/14/24 0921)  Resp: 19 (01/14/24 0921)  BP: (!) 162/74 (01/14/24 0921)  SpO2: (!) 90 % (01/14/24 0921) Vital Signs (24h Range):  Temp:  [98.1 °F (36.7 °C)-99.8 °F (37.7 °C)] 98.6 °F (37 °C)  Pulse:  [78-92] 83  Resp:  [16-20] 19  SpO2:  [89 %-96 %] 90 %  BP: (128-174)/(58-86) 162/74     Weight: 125.3 kg (276 lb 3.8 oz)  Body mass index is 36.44 kg/m².    Intake/Output Summary (Last 24 hours) at 1/14/2024 0921  Last data filed at 1/14/2024 0619  Gross per 24 hour   Intake 931.25 ml   Output 2275 ml   Net -1343.75 ml         Physical Exam  Constitutional:       General: He is not in acute distress.     Appearance: He is well-developed. He is obese. He is not ill-appearing or diaphoretic.   HENT:      Head: Normocephalic and atraumatic.   Eyes:      Pupils: Pupils are equal, round, and reactive to light.   Cardiovascular:      Rate and Rhythm: Normal rate and regular rhythm.      Heart sounds: Normal heart sounds. No murmur heard.     No friction rub. No gallop.   Pulmonary:      Effort: Pulmonary effort is normal. No respiratory distress.      Breath sounds: No stridor. Rales present. No wheezing.   Abdominal:      General: Bowel sounds are normal. There is no  distension.      Palpations: Abdomen is soft. There is no mass.      Tenderness: There is no abdominal tenderness. There is no guarding.   Musculoskeletal:      Right lower leg: Edema present.      Left lower leg: Edema present.   Skin:     General: Skin is warm.      Findings: No erythema.   Neurological:      Mental Status: He is alert and oriented to person, place, and time.             Significant Labs: All pertinent labs within the past 24 hours have been reviewed.    Significant Imaging: I have reviewed all pertinent imaging results/findings within the past 24 hours.

## 2024-01-14 NOTE — ASSESSMENT & PLAN NOTE
Patient with Hypoxic Respiratory failure which is Acute.  he is not on home oxygen. Supplemental oxygen was provided and noted- Oxygen Concentration (%):  [60-80] 80    .   Signs/symptoms of respiratory failure include- tachypnea, increased work of breathing, respiratory distress, and use of accessory muscles. Contributing diagnoses includes - CHF Labs and images were reviewed. Patient Has not had a recent ABG. Will treat underlying causes and adjust management of respiratory failure as follows-     1/14:   Patient on Vapotherm 30 L 85% FiO2   Chest x-ray showed worsening  Continue with diuresis   Will treat empirically for pneumonia   Continue IV Levaquin  Sputum culture pending   Will obtain CT chest today due to worsening respiratory status  Consult pulmonology on case

## 2024-01-14 NOTE — ASSESSMENT & PLAN NOTE
Patient's FSGs are uncontrolled due to hyperglycemia on current medication regimen.  Last A1c reviewed-   Lab Results   Component Value Date    HGBA1C 6.2 (H) 01/10/2024     Most recent fingerstick glucose reviewed-   Recent Labs   Lab 01/13/24  1207 01/13/24  1626 01/13/24  2059 01/14/24  0008   POCTGLUCOSE 211* 170* 205* 175*       Current correctional scale  Medium  Maintain anti-hyperglycemic dose as follows-   Antihyperglycemics (From admission, onward)      Start     Stop Route Frequency Ordered    01/13/24 0900  insulin detemir U-100 (Levemir) pen 14 Units         -- SubQ Daily 01/13/24 0726    01/10/24 1728  insulin aspart U-100 pen 0-10 Units         -- SubQ Before meals & nightly PRN 01/10/24 1629          Hold Oral hypoglycemics while patient is in the hospital.    1/14  Glucose controlled  Will continue Levemir 14 units   Continue sliding scale

## 2024-01-14 NOTE — ASSESSMENT & PLAN NOTE
Patient with acute kidney injury/acute renal failure likely due to post-obstructive d/t bladder outlet obstruction  ELMIRA is currently worsening. Baseline creatinine unknown - Labs reviewed- Renal function/electrolytes with Estimated Creatinine Clearance: 24.1 mL/min (A) (based on SCr of 4.8 mg/dL (H)). according to latest data. Monitor urine output and serial BMP and adjust therapy as needed. Avoid nephrotoxins and renally dose meds for GFR listed above.    Hold further IV diuretics for now  Consult Nephrology  Strict I/O's, avoid nephrotoxins  May require HD, has right AVF since Sept '23.    1/14:   Nephrology on case  Continue to monitor renal functions  Continue Bumex

## 2024-01-14 NOTE — ASSESSMENT & PLAN NOTE
Patient is identified as having  unknown, TTE pending  heart failure that is Acute. CHF is currently uncontrolled due to Rales/crackles on pulmonary exam and Pulmonary edema/pleural effusion on CXR. Latest ECHO performed and demonstrates- No results found for this or any previous visit.  .  Monitor strict Is&Os and daily weights.    Place on fluid restriction of 1.5 L.   Last BNP reviewed- and noted below   Recent Labs   Lab 01/10/24  1631   *       TTE pending  Consult Cardiology    1/14:   Cardiology on case   Echo reviewed   Normal EF   Likely diastolic heart failure exacerbation  Patient still appears overloaded   Will continue Bumex 2 mg q.8h

## 2024-01-14 NOTE — ASSESSMENT & PLAN NOTE
52 y/o male with CKD stage 5 presented with volume overload:     Stage 5 chronic kidney disease due to type 2 diabetes mellitus  Ps Cr stable and unchanged this admit  Stable renal function, CKD stage 5  s Cr has been stable in the past 6 months at least  CKD likely due to DM and HTN     Complications of CKDL  K normal  Mild hyponatremia  Mild metabolic acidosis  Mild hypocalcemia  PO4 not high  iPTH pending  Anemia, will start epogen. Low iron sat, will give venofer IV loading dose     Volume overload. Pulmonary edema:  Pt has responded well to diuretics for fluid gain, will continue bumex and switch from IV to po  Will add metolazone prn (still fluid overloaded and has borderline O2 sat)  Hemodynamically stable     Pt can be discharged after control of BP achieved to follow with his own nephrologist     CHF (congestive heart failure)  Reviewed the chart. See above for diuretics mgmt  Pulmonary edema  Good response to diuretics  Will monitor     Essential hypertension  BP improved but still not controled  Meds reviewed  Noted hydralazine was further increased by PCP from 75 to 100 mg po tid  Will add clonidine 0.1 mg po bid     Type 2 diabetes mellitus  Long standing  Reviewed the chart     Plans and recommendations:  As discussed above  Total time spent 40 minutes including time needed to review the records, the   patient evaluation, documentation, face-to-face discussion with the patient,   more than 50% of the time was spent on coordination of care and counseling

## 2024-01-14 NOTE — PROGRESS NOTES
"[facility] Medicine  Progress Note    Patient Name: Moody Silverio  MRN: [id number]  Patient Class: IP- Inpatient   Admission Date: 1/10/2024  Length of Stay: 4 days  Attending Physician: Abhijeet Kennedy MD  Primary Care Provider: Jersey Nolan MD        Subjective:     Principal Problem:Acute kidney injury superimposed on CKD        HPI:  Moody Silverio is 54 y/o male with PMHx IDDM, HTN, CKD, Hx of CVA with right hemiparesis, anemia of chronic disease, BPH, chronic back pain, anxiety who presented to [facility] on 1-8-24 with c/o SOB due to volume overload/CHF exacerbation and pneumonia and also acute on CKD. History obtained from records sent from outside hospital, patient, and family at bedside. Patient remained at outside hospital ER till transfer to [facility] today (1-10-24). Patient arrived to [facility] with Valera in place and on supplemental O2. Patient with right forearm AV shunt. Patient reports continued SOB, constipation, abdominal distention, fatigue.    Outside hospital ER labs/imaging reviewed:  WBC 11  BUN/Cr 77/4.8 (1/8/24); Cr 4.55    H/H 8.6/27.4  CXR with bilateral upper and lower lobe patchy hazy pulmonary opacities either edema or pneumonia (1/9/24)  CT Chest "bilateral pneumonia with an underlying component of volume overload." (1/8/24)    ER course reviewed: patient was initially placed on BiPAP, transitioned to NC (2-3 L), and also required Valera cathter placement due to urinary outlet obstruction. He was treated with pneumonia and CHF exacerbation, received ceftriaxone, azithromycin, doxycycline, DuoNebs, Bumex IV, Lasix IV. Also noted he developed reaction to ceftriaxone, listed as allergy[facility] as [facility] to [facility] on 1[facility]8-24.    Overview/Hospital Course:  1/11:  Nephrology and cardiology on case.  Recommend Bumex 2 mg q.8h.  Will continue p.o. doxycycline.  " Wean O2 as tolerated.  Echo showed normal EF.  Likely diastolic heart failure exacerbation.  1/12:  Patient requiring high-flow Vapotherm today, will repeat stat chest x-ray.  Continue with diuresis.  Anemia noted, likely to CKD.  Will check iron studies.  Consider EPO and iron replacement.  1/13:  Patient currently on 30 L 60% FiO2.  Continue with diuresis.  Start IV Levaquin for pneumonia.  Anemia again noted, will transfuse 1 unit packed RBCs.  Likely secondary to CKD and iron deficiency.  Start iron replacement therapy.  EPO given.  1/14:  Patient on 30 L 85% FiO2.  Continue diuresis.  Continue Levaquin.  H&H improved status post 1 unit packed RBC.  Continue iron replacement therapy.    Interval History:  No acute issues overnight.  Patient reports shortness of breath appears to be improving.    Review of Systems   Constitutional:  Negative for fatigue and fever.   HENT:  Negative for sinus pressure.    Eyes:  Negative for visual disturbance.   Respiratory:  Positive for shortness of breath (Improving).    Cardiovascular:  Positive for leg swelling. Negative for chest pain.   Gastrointestinal:  Negative for nausea and vomiting.   Genitourinary:  Negative for difficulty urinating.   Musculoskeletal:  Negative for back pain.   Skin:  Negative for rash.   Neurological:  Negative for headaches.   Psychiatric/Behavioral:  Negative for confusion.      Objective:     Vital Signs (Most Recent):  Temp: 98.6 °F (37 °C) (01/14/24 0921)  Pulse: 83 (01/14/24 0921)  Resp: 19 (01/14/24 0921)  BP: (!) 162/74 (01/14/24 0921)  SpO2: (!) 90 % (01/14/24 0921) Vital Signs (24h Range):  Temp:  [98.1 °F (36.7 °C)-99.8 °F (37.7 °C)] 98.6 °F (37 °C)  Pulse:  [78-92] 83  Resp:  [16-20] 19  SpO2:  [89 %-96 %] 90 %  BP: (128-174)/(58-86) 162/74     Weight: 125.3 kg (276 lb 3.8 oz)  Body mass index is 36.44 kg/m².    Intake/Output Summary (Last 24 hours) at 1/14/2024 0902  Last data filed at 1/14/2024 0619  Gross per 24 hour   Intake 931.25  ml   Output 2275 ml   Net -1343.75 ml         Physical Exam  Constitutional:       General: He is not in acute distress.     Appearance: He is well-developed. He is obese. He is not ill-appearing or diaphoretic.   HENT:      Head: Normocephalic and atraumatic.   Eyes:      Pupils: Pupils are equal, round, and reactive to light.   Cardiovascular:      Rate and Rhythm: Normal rate and regular rhythm.      Heart sounds: Normal heart sounds. No murmur heard.     No friction rub. No gallop.   Pulmonary:      Effort: Pulmonary effort is normal. No respiratory distress.      Breath sounds: No stridor. Rales present. No wheezing.   Abdominal:      General: Bowel sounds are normal. There is no distension.      Palpations: Abdomen is soft. There is no mass.      Tenderness: There is no abdominal tenderness. There is no guarding.   Musculoskeletal:      Right lower leg: Edema present.      Left lower leg: Edema present.   Skin:     General: Skin is warm.      Findings: No erythema.   Neurological:      Mental Status: He is alert and oriented to person, place, and time.             Significant Labs: All pertinent labs within the past 24 hours have been reviewed.    Significant Imaging: I have reviewed all pertinent imaging results/findings within the past 24 hours.    Assessment/Plan:      * Acute kidney injury superimposed on CKD  Patient with acute kidney injury/acute renal failure likely due to post-obstructive d/t bladder outlet obstruction  ELMIRA is currently worsening. Baseline creatinine unknown - Labs reviewed- Renal function/electrolytes with Estimated Creatinine Clearance: 24.1 mL/min (A) (based on SCr of 4.8 mg/dL (H)). according to latest data. Monitor urine output and serial BMP and adjust therapy as needed. Avoid nephrotoxins and renally dose meds for GFR listed above.    Hold further IV diuretics for now  Consult Nephrology  Strict I/O's, avoid nephrotoxins  May require HD, has right AVF since Sept '23.    1/14:    Nephrology on case  Continue to monitor renal functions  Continue Bumex    Acute hypoxic respiratory failure  Patient with Hypoxic Respiratory failure which is Acute.  he is not on home oxygen. Supplemental oxygen was provided and noted- Oxygen Concentration (%):  [60-80] 80    .   Signs/symptoms of respiratory failure include- tachypnea, increased work of breathing, respiratory distress, and use of accessory muscles. Contributing diagnoses includes - CHF Labs and images were reviewed. Patient Has not had a recent ABG. Will treat underlying causes and adjust management of respiratory failure as follows-     1/14:   Patient on Vapotherm 30 L 85% FiO2   Chest x-ray showed worsening  Continue with diuresis   Will treat empirically for pneumonia   Continue IV Levaquin  Sputum culture pending   Will obtain CT chest today due to worsening respiratory status  Consult pulmonology on case      CHF (congestive heart failure)  Patient is identified as having  unknown, TTE pending  heart failure that is Acute. CHF is currently uncontrolled due to Rales/crackles on pulmonary exam and Pulmonary edema/pleural effusion on CXR. Latest ECHO performed and demonstrates- No results found for this or any previous visit.  .  Monitor strict Is&Os and daily weights.    Place on fluid restriction of 1.5 L.   Last BNP reviewed- and noted below   Recent Labs   Lab 01/10/24  1631   *       TTE pending  Consult Cardiology    1/14:   Cardiology on case   Echo reviewed   Normal EF   Likely diastolic heart failure exacerbation  Patient still appears overloaded   Will continue Bumex 2 mg q.8h    Benign prostatic hyperplasia with urinary obstruction  Valera in place on arrival, placed at [facility]  CT C/A/P pending  Consider Urology consult pending CT results  Strict I/O's      Type 2 diabetes mellitus  Patient's FSGs are uncontrolled due to hyperglycemia on current medication regimen.  Last A1c reviewed-   Lab Results   Component Value  Date    HGBA1C 6.2 (H) 01/10/2024     Most recent fingerstick glucose reviewed-   Recent Labs   Lab 01/13/24  1207 01/13/24  1626 01/13/24 2059 01/14/24  0008   POCTGLUCOSE 211* 170* 205* 175*       Current correctional scale  Medium  Maintain anti-hyperglycemic dose as follows-   Antihyperglycemics (From admission, onward)      Start     Stop Route Frequency Ordered    01/13/24 0900  insulin detemir U-100 (Levemir) pen 14 Units         -- SubQ Daily 01/13/24 0726    01/10/24 1728  insulin aspart U-100 pen 0-10 Units         -- SubQ Before meals & nightly PRN 01/10/24 1629          Hold Oral hypoglycemics while patient is in the hospital.    1/14  Glucose controlled  Will continue Levemir 14 units   Continue sliding scale    Anemia in chronic kidney disease (CKD)  H&H improved status post 1 unit packed RBCs   Continue p.o. iron supplementation  Nephrology add Venofer   He has received 1 dose EPO this stay    Essential hypertension  Chronic, controlled. Latest blood pressure and vitals reviewed-     Temp:  [98.2 °F (36.8 °C)]   Pulse:  [85-92]   Resp:  [18-22]   BP: (174-180)/(73-75)   SpO2:  [95 %-96 %] .   Home meds for hypertension were reviewed and noted below.   Hypertension Medications               amLODIPine (NORVASC) 10 MG tablet Take 10 mg by mouth once daily.    carvediloL (COREG) 25 MG tablet Take 25 mg by mouth 2 (two) times daily.    furosemide (LASIX) 80 MG tablet Take 80 mg by mouth 2 (two) times daily.    hydrALAZINE (APRESOLINE) 50 MG tablet Take 50 mg by mouth 3 (three) times daily.            While in the hospital, will manage blood pressure as follows; Continue home antihypertensive regimen    Will utilize p.r.n. blood pressure medication only if patient's blood pressure greater than 180/110 and he develops symptoms such as worsening chest pain or shortness of breath.      VTE Risk Mitigation (From admission, onward)           Ordered     enoxaparin injection 30 mg  Every 24 hours          01/14/24 0958     IP VTE LOW RISK PATIENT  Once         01/10/24 1557     Place sequential compression device  Until discontinued         01/10/24 1557                    Discharge Planning   ALONDRA:      Code Status: DNR   Is the patient medically ready for discharge?:     Reason for patient still in hospital (select all that apply): Patient trending condition  Discharge Plan A: Home Health                  Abhijeet Kennedy MD  Department of Hospital Medicine   [facility] ([facility])

## 2024-01-14 NOTE — PROGRESS NOTES
[facility] - Telemetry ([facility])  Cardiology  Progress Note     Patient Name: Moody Silverio  MRN: [id number]  Admission Date: 1/10/2024  Hospital Length of Stay: 2 days  Code Status: Full Code   Attending Physician: Abhijeet Kennedy MD   Primary Care Physician: Jersey Nolan MD  Expected Discharge Date:   Principal Problem:Acute kidney injury superimposed on CKD     Subjective:   HPI:  Mr. Silverio is a 53 year old male patient whose current medical conditions include IDDM, HTN, CKD, prior CVA with residual right-sided hemiparesis, anemia of chronic disease, and anxiety who initially presented to [facility] on 1/8/24 due to SOB/CHF exacerbation, PNA, and CKD. Initial workup revealed BNP of 419, creatinine of 4.55, and anemia (H/H 8.6/27.4). Patient initially required bipap placement but improved after being treated with IV Lasix. He was also treated with abx and nebulizers. Transfer requested for nephrology consultation. Cardiology consulted to assist with management. Patient seen and examined today, resting in bed. Feels ok. Still SOB, on supplemental O2. Denies any current CP symptoms  Chart reviewed. Pro-meredith +. BNP improved to 240. Creatinine 4.7. Repeat CT of chest/abd/pelvis showed small bilateral effusions and multifocal pulmonary opacities possibly related to infection or non-infectious inflammation. TTE pending. Nephrology consulted, await rec's.              Hospital Course:   1/12/24-Patient seen and examined today. More SOB, on Vapotherm. No CP. Started on IV Bumex by nephrology yesterday, still appears overloaded. CXR with worsening appearance. Labs reviewed. Creatinine 4.8. H/H 7.7/24.5.     1/13/24- Patient seen and examined today. Continue diueris with bumex. Output of 5 liters. Bp was elevated. Added Imdur. Increased hydralazine.     1/14/24- No acute events overnight. Pt is diuresing well. Received 1 unit of blood yesterday. Hemoglobin is 8.2 this morning. Creatinine is stable. BUN is  elevated compared to yesterday. Pt remains on high flow NC. BP has improved.        Review of Systems   Constitutional: Positive for malaise/fatigue.   HENT: Negative.     Eyes: Negative.    Cardiovascular:  Positive for dyspnea on exertion and leg swelling.   Respiratory:  Positive for shortness of breath.    Endocrine: Negative.    Hematologic/Lymphatic: Negative.    Skin: Negative.    Musculoskeletal: Negative.    Gastrointestinal: Negative.    Genitourinary: Negative.    Neurological:  Positive for weakness.   Psychiatric/Behavioral: Negative.     Allergic/Immunologic: Negative.       Objective:      Vital Signs (Most Recent):  Temp: 99.8 °F (37.7 °C) (01/12/24 1230)  Pulse: 88 (01/12/24 1230)  Resp: 18 (01/12/24 1230)  BP: (!) 173/79 (01/12/24 1230)  SpO2: 95 % (01/12/24 1230) Vital Signs (24h Range):  Temp:  [98 °F (36.7 °C)-99.8 °F (37.7 °C)] 99.8 °F (37.7 °C)  Pulse:  [74-88] 88  Resp:  [16-24] 18  SpO2:  [90 %-98 %] 95 %  BP: (153-177)/(71-79) 173/79      Weight: 126.1 kg (278 lb)  Body mass index is 36.68 kg/m².     SpO2: 95 %           Intake/Output Summary (Last 24 hours) at 1/12/2024 1231  Last data filed at 1/12/2024 1042      Gross per 24 hour   Intake 960 ml   Output 1400 ml   Net -440 ml         Lines/Drains/Airways         Drain  Duration                     Urethral Catheter 01/11/24 1900 16 Fr. <1 day                  Peripheral Intravenous Line  Duration                     Hemodialysis AV Fistula Right forearm -- days          Peripheral IV - Single Lumen 01/11/24 1620 22 G Anterior;Left Hand <1 day                             Physical Exam  Vitals and nursing note reviewed.   Constitutional:       General: He is not in acute distress.     Appearance: He is well-developed. He is ill-appearing. He is not diaphoretic.      Comments: ON vapotherm   HENT:      Head: Normocephalic and atraumatic.   Eyes:      General:         Right eye: No discharge.         Left eye: No discharge.      Pupils:  Pupils are equal, round, and reactive to light.   Cardiovascular:      Rate and Rhythm: Normal rate and regular rhythm.      Heart sounds: Normal heart sounds, S1 normal and S2 normal. No murmur heard.  Pulmonary:      Breath sounds: Rhonchi and rales present. No wheezing.   Abdominal:      General: There is no distension.   Musculoskeletal:      Right lower leg: Edema present.      Left lower leg: Edema present.   Skin:     General: Skin is warm and dry.      Findings: No erythema.   Neurological:      General: No focal deficit present.      Mental Status: He is alert and oriented to person, place, and time.   Psychiatric:         Mood and Affect: Mood normal.         Behavior: Behavior normal.         Thought Content: Thought content normal.               Significant Labs: All pertinent lab results from the last 24 hours have been reviewed.     Significant Imaging: All pertinent imaging results from the last 24 hours have been reviewed.    Assessment and Plan:        * Acute kidney injury superimposed on CKD  -Mgmt as per nephrology        Acute hypoxic respiratory failure  -Continue IV diuresis  -Mgmt as per hospital medicine     CHF (congestive heart failure)  -Presents with decompensated CHF  -BNP improving but CT of chest/abdomen/pelvis still with small effusions and bilateral opacities (infectious vs non-infectious inflammation) and still appears overloaded  -Await nephrology rec's  -Continue BB, hydralazine as tolerated  -Strict I's/O's  -TTE pending     1/12/24  -TTE with normal EF, DD  -Still appears overloaded, CXR with worsening appearance, on Vapotherm this AM  -Continue IV diuresis  -Nephrology following    1/13/24  -Continue IV diuersis   - Add imdur and increase hydralazine   -BP elevated    1/14/24   -Pt is diuresing well.  - Received 1 unit of blood yesterday.  -BUN is elevated compared to yesterday.   -BP has improved.           Type 2 diabetes mellitus  -Mgmt as per primary team     Normocytic  anemia  -Mgmt as per primary team    1/14/24  - Received 1 unit of blood yesterday  -Hemoglobin is 8.2 this morning        Essential hypertension  -Continue Coreg as BP permits  -Continue hydralazine, amlodipine  -Monitor BP trend  -Mgmt as per primary team           VTE Risk Mitigation (From admission, onward)              Ordered       IP VTE LOW RISK PATIENT  Once         01/10/24 1557       Place sequential compression device  Until discontinued         01/10/24 1557                          Dusty Gonsalez MD   Cardiology  [facility] - Telemetry ([facility])  SCRIBE #1 NOTE: I, Addis Springer, am scribing for, and in the presence of,  Dusty Gonsalez MD. I have scribed the entire note.        I have seen the patient, personally interviewed, and examined the patient at bedside.  I have performed the substantive portion of the visit and formulated the assessment and plan as documented by scribe.     Dusty Gonsalez MD, [facility]  Cardiovascular Disease  [facility], [place]  [phone] (P)

## 2024-01-14 NOTE — ASSESSMENT & PLAN NOTE
H&H improved status post 1 unit packed RBCs   Continue p.o. iron supplementation  Nephrology add Venofer   He has received 1 dose EPO this stay

## 2024-01-15 NOTE — ASSESSMENT & PLAN NOTE
Patient is identified as having  unknown, TTE pending  heart failure that is Acute. CHF is currently uncontrolled due to Rales/crackles on pulmonary exam and Pulmonary edema/pleural effusion on CXR. Latest ECHO performed and demonstrates- No results found for this or any previous visit.  .  Monitor strict Is&Os and daily weights.    Place on fluid restriction of 1.5 L.   Last BNP reviewed- and noted below   Recent Labs   Lab 01/10/24  1631   *       TTE pending  Consult Cardiology    1/15:   Cardiology on case   Echo reviewed   Normal EF   Likely diastolic heart failure exacerbation  Patient still appears overloaded   Will continue Bumex 2 mg q.8h  Metolazone added

## 2024-01-15 NOTE — PT/OT/SLP EVAL
"Occupational Therapy Evaluation and Treatment    Name: Moody Silverio  MRN: [id number]  Admitting Diagnosis: Acute kidney injury superimposed on CKD  Recent Surgery: * No surgery found *      Recommendations:     Discharge Recommendations: Moderate Intensity Therapy  Level of Assistance Recommended: 24 hours significant assistance  Discharge Equipment Recommendations: to be determined by next level of care  Barriers to discharge: Decreased caregiver support    Assessment:     Moody Silverio is a 53 y.o. male with a medical diagnosis of Acute kidney injury superimposed on CKD. He presents with performance deficits affecting function including weakness, impaired endurance, impaired self care skills, impaired functional mobility, impaired balance, impaired cardiopulmonary response to activity, decreased safety awareness, impaired coordination, impaired fine motor, decreased upper extremity function, decreased lower extremity function, edema.    Rehab Prognosis: Good; patient would benefit from acute OT services to address these deficits and reach maximum level of function.    Plan:     Patient to be seen 2 x/week to address the above listed problems via self-care/home management, therapeutic activities, therapeutic exercises  Plan of Care Expires: 01/29/24  Plan of Care Reviewed with: patient, sibling    Subjective     Chief Complaint: Reported "I will try."  Patient Comments/Goals: increase independence  Pain/Comfort:  Pain Rating 1: 0/10    Social History:  Living Environment: Patient lives alone in a single story home with  ramp to enter.  Prior Level of Function: Prior to admission, patient was modified independent with limited community distance ambulation via 4WW. Completed basic ADLs mod I, requires A of personal caregiver for bathing.  Roles and Routines: Patient was not driving and not working prior to admission. Family assists with transportation.  Equipment Used at Home: bath bench, rollator, wheelchair, cane, " straight  DME owned (not currently used): none  Assistance Upon Discharge: family    Objective:     Communicated with nurse, Mirta, prior to session. Patient found supine with telemetry, peripheral IV, vargas catheter, bed alarm (vapotherm) upon OT entry to room.    General Precautions: Standard, fall, respiratory, legally blind  Orthopedic Precautions: N/A   Braces: N/A    Respiratory Status:  vapotherm 30L @ 60%    Occupational Performance    Gait belt applied - Yes    Bed Mobility:   Supine to sit from right side of bed with minimum assistance    Functional Mobility/Transfers:  Sit <> Stand Transfer with moderate assistance and 2 persons with hand-held assist  Bed <> Chair Transfer using Step Transfer technique with moderate assistance and 2 persons with hand-held assist  Significant posterior instability upon standing  Provided with max v/c for technique with transfers to increase safety and independence with completion    Activities of Daily Living:  Grooming: moderate assistance  Lower Body Dressing: total assistance    Cognitive/Visual Perceptual:  Cognitive/Psychosocial Skills:    -     Oriented to: Person and Place  -     Follows Commands/attention: Easily distracted and Follows one-step commands    Physical Exam:  Balance:    -     Sitting: contact guard assistance  -     Standing: moderate assistance and of 2 persons  Upper Extremity Range of Motion:     -       Right Upper Extremity: WFL  -       Left Upper Extremity: WFL  Upper Extremity Strength:    -       Right Upper Extremity: Deficits: grossly 4-/5  -       Left Upper Extremity: Deficits: grossly 4-/5   Strength:    -       Right Upper Extremity: Deficits: poor  -       Left Upper Extremity: Deficits: poor    AMPAC 6 Click ADL:  AMPAC Total Score: 10    Treatment & Education:  Therapist provided facilitation and instruction of proper body mechanics, energy conservation, and fall prevention strategies during tasks listed above  Patient educated  on role of OT, POC, and goals for therapy  Patient educated on importance of OOB activities with staff member assistance and sitting OOB majority of the day  Encouraged completion of B UE AROM therex throughout the day to increase functional strength and activity tolerance needed for ADL completion.    Patient left up in chair with all lines intact, call button in reach, chair alarm on, and nurse and sister present.    GOALS:   Multidisciplinary Problems       Occupational Therapy Goals          Problem: Occupational Therapy    Goal Priority Disciplines Outcome Interventions   Occupational Therapy Goal     OT, PT/OT     Description: Goals to be met by: 1/29/24     Patient will increase functional independence with ADLs by performing:    Toileting from bedside commode with Minimal Assistance for hygiene and clothing management.   Toilet transfer to bedside commode with Minimal Assistance.  Increased functional strength in B UE grossly by 1/2 MM grade.                         History:     Past Medical History:   Diagnosis Date    Diabetes mellitus     Hypertension     Stroke 2017/ 2018         Past Surgical History:   Procedure Laterality Date    LEG SURGERY      TOE AMPUTATION         Time Tracking:     OT Date of Treatment: 01/15/24  OT Start Time: 1140  OT Stop Time: 1205  OT Total Time (min): 25 min    Billable Minutes: Evaluation 15 and Therapeutic Activity 10    Paula Omalley OT  1/15/2024

## 2024-01-15 NOTE — ASSESSMENT & PLAN NOTE
Patient with Hypoxic Respiratory failure which is Acute.  he is not on home oxygen. Supplemental oxygen was provided and noted- Oxygen Concentration (%):  [60-80] 60    .   Signs/symptoms of respiratory failure include- tachypnea, increased work of breathing, respiratory distress, and use of accessory muscles. Contributing diagnoses includes - CHF Labs and images were reviewed. Patient Has not had a recent ABG. Will treat underlying causes and adjust management of respiratory failure as follows-     1/15:  Cont levaquin  Cont diuresis   Wean o2 as tolerated  Currently on 30L 60% fio2

## 2024-01-15 NOTE — HPI
Moody Silverio is a 53-year-old male with a known past medical history of chronic kidney disease stage IV, hypertension, BPH, CHF, anemia, prior CVA, blindness, diabetes mellitus, and foot wounds who was admitted by [facility] medicine 1/8/2024 for treatment of volume overload, CHF exacerbation and pneumonia. He initially presented to [facility] in [place], [place] and was treated with BiPAP, IV bumex, antibiotics, and nebulizer treatments. Transfer was requested for Nephrology services who were consulted and have been following since admission. Pulmonary team consulted for evaluation and additional recommendations for ongoing hypoxia requiring vapotherm despite IV diuretics and antibiotics.     Upon exam, Mr. Uriostegui is lying in bed and appears to be breathing comfortably with vapotherm in place. He denies any subjective complaints and reports feeling better. Tmax the past 24 hours 100.8. Sputum culture ordered 1/11 but patient is not producing any for sampling. Patient reports no BM in a few days with soft but distended abdomen. Denies history of known TANA or prior recommendation or or use of CPAP.     Hospital Course/Interval History:   1/16: continues to deny any pulmonary symptoms despite stable, maybe even a little progression of bilateral opacities on CXR. Increased Vapotherm requirements today. [facility] has offered to initiate HD at this point, patient was given time to make a decision and tells me that he is ready to start. WBC normal, no fevers, procal down trending   1/19: patient with increased work of breathing today; remains on Vapotherm increased to 40/1.00 this morning. Wheezing noted throughout. Underwent HD yesterday with plans for repeat HD today.

## 2024-01-15 NOTE — PLAN OF CARE
OT alina completed. Sup>sit min A, sit>stand mod A of 2, step>pivot to bedside chair with mod HHA of 2. Recommending moderate intensity intervention at d/c.

## 2024-01-15 NOTE — ASSESSMENT & PLAN NOTE
H/h trending down  No active signs of bleeding  Cont to monitor  Continue p.o. iron supplementation  Nephrology added Venofer   He has received 1 dose EPO this stay

## 2024-01-15 NOTE — SUBJECTIVE & OBJECTIVE
Interval History:  No acute issues overnight.      Review of Systems   Constitutional:  Negative for fatigue and fever.   HENT:  Negative for sinus pressure.    Eyes:  Positive for visual disturbance.   Respiratory:  Positive for shortness of breath (Improving).    Cardiovascular:  Positive for leg swelling. Negative for chest pain.   Gastrointestinal:  Negative for nausea and vomiting.   Genitourinary:  Negative for difficulty urinating.   Musculoskeletal:  Negative for back pain.   Skin:  Negative for rash.   Neurological:  Negative for headaches.   Psychiatric/Behavioral:  Negative for confusion.      Objective:     Vital Signs (Most Recent):  Temp: 98.4 °F (36.9 °C) (01/15/24 0724)  Pulse: 81 (01/15/24 0724)  Resp: 19 (01/15/24 0724)  BP: (!) 142/67 (01/15/24 0724)  SpO2: (!) 93 % (01/15/24 0817) Vital Signs (24h Range):  Temp:  [98.2 °F (36.8 °C)-100.8 °F (38.2 °C)] 98.4 °F (36.9 °C)  Pulse:  [75-86] 81  Resp:  [17-20] 19  SpO2:  [90 %-98 %] 93 %  BP: (133-146)/(60-68) 142/67     Weight: 127.2 kg (280 lb 6.8 oz)  Body mass index is 37 kg/m².    Intake/Output Summary (Last 24 hours) at 1/15/2024 0933  Last data filed at 1/15/2024 0644  Gross per 24 hour   Intake --   Output 2000 ml   Net -2000 ml           Physical Exam  Constitutional:       General: He is not in acute distress.     Appearance: He is well-developed. He is obese. He is not ill-appearing or diaphoretic.   HENT:      Head: Normocephalic and atraumatic.   Eyes:      Pupils: Pupils are equal, round, and reactive to light.   Cardiovascular:      Rate and Rhythm: Normal rate and regular rhythm.      Heart sounds: Normal heart sounds. No murmur heard.     No friction rub. No gallop.   Pulmonary:      Effort: Pulmonary effort is normal. No respiratory distress.      Breath sounds: No stridor. Rales present. No wheezing.   Abdominal:      General: Bowel sounds are normal. There is no distension.      Palpations: Abdomen is soft. There is no mass.       Tenderness: There is no abdominal tenderness. There is no guarding.   Musculoskeletal:      Right lower leg: Edema present.      Left lower leg: Edema present.   Skin:     General: Skin is warm.      Findings: No erythema.   Neurological:      Mental Status: He is alert and oriented to person, place, and time.             Significant Labs: All pertinent labs within the past 24 hours have been reviewed.    Significant Imaging: I have reviewed all pertinent imaging results/findings within the past 24 hours.

## 2024-01-15 NOTE — CONSULTS
[facility] - Telemetry ([facility])  Pulmonology  Consult Note    Patient Name: Moody Silverio  MRN: [id number]  Admission Date: 1/10/2024  Hospital Length of Stay: 5 days  Code Status: DNR  Attending Physician: Abhijeet Kennedy MD  Primary Care Provider: Jersey Nolan MD   Principal Problem: Acute kidney injury superimposed on CKD    Inpatient consult to Pulmonology  Consult performed by: Chayo Trent NP  Consult ordered by: Abhijeet Kennedy MD        Subjective:     HPI:  53-year-old male with a known past medical history of CKD IV, HTN, BPH, CHF, anemia, prior CVA, blindness, DM, and foot wounds who was admitted by [facility] medicine 1/8/2024 for treatment of volume overload, CHF exacerbation and pneumonia. He initially presented to [facility] in [place], [place] and was treated with BiPAP, IV bumex, antibiotics, and nebulizer treatments. Transfer was requested for Nephrology services who were consulted and have been following since admission. Pulmonary team consulted for evaluation and additional recommendations for ongoing hypoxia requiring vapotherm despite IV diuretics and antibiotics.     Upon exam, Mr. Uriostegui is lying in bed and appears to be breathing comfortably with vapotherm in place. He denies any subjective complaints and reports feeling better. Tmax the past 24 hours 100.8. Sputum culture ordered 1/11 but patient is not producing any for sampling. Patient reports no BM in a few days with soft but distended abdomen. Denies history of known TANA or prior recommendation or or use of CPAP.     Past Medical History:   Diagnosis Date    Diabetes mellitus     Hypertension     Stroke 2017/ 2018       Past Surgical History:   Procedure Laterality Date    LEG SURGERY      TOE AMPUTATION         Review of patient's allergies indicates:   Allergen Reactions    Ceftriaxone Itching and Swelling       Family History       Problem Relation (Age of Onset)    Cancer Mother    Diabetes Brother    Hypertension Mother, Father           Tobacco Use    Smoking status: Never    Smokeless tobacco: Never   Substance and Sexual Activity    Alcohol use: Not Currently    Drug use: Not on file    Sexual activity: Not on file         Review of Systems  Reviewed and negative except per HPI.   Objective:     Vital Signs (Most Recent):  Temp: 98.4 °F (36.9 °C) (01/15/24 0724)  Pulse: 81 (01/15/24 0724)  Resp: 19 (01/15/24 0724)  BP: (!) 142/67 (01/15/24 0724)  SpO2: (!) 93 % (01/15/24 0817) Vital Signs (24h Range):  Temp:  [98.4 °F (36.9 °C)-100.8 °F (38.2 °C)] 98.4 °F (36.9 °C)  Pulse:  [80-83] 81  Resp:  [17-19] 19  SpO2:  [91 %-96 %] 93 %  BP: (133-142)/(60-67) 142/67     Weight: 127.2 kg (280 lb 6.8 oz)  Body mass index is 37 kg/m².      Intake/Output Summary (Last 24 hours) at 1/15/2024 1348  Last data filed at 1/15/2024 0644  Gross per 24 hour   Intake --   Output 1200 ml   Net -1200 ml        Physical Exam  Vitals reviewed.   Constitutional:       Appearance: He is obese.   HENT:      Head: Normocephalic and atraumatic.      Mouth/Throat:      Mouth: Mucous membranes are moist.      Pharynx: Oropharynx is clear.   Eyes:      General: No scleral icterus.        Right eye: No discharge.         Left eye: No discharge.   Cardiovascular:      Rate and Rhythm: Normal rate and regular rhythm.   Pulmonary:      Effort: Pulmonary effort is normal.      Comments: Diminished throughout but clear. No wheezing or rales. Vapotherm in place- 30L 60%  Abdominal:      General: Bowel sounds are normal. There is distension.      Palpations: Abdomen is soft.   Musculoskeletal:         General: No deformity.      Cervical back: Normal range of motion and neck supple.      Right lower leg: Edema present.      Left lower leg: Edema present.   Skin:     General: Skin is warm and dry.      Comments: Bilateral feet with dressings in place.    Neurological:      Mental Status: He is alert and oriented to person, place, and time. Mental status is at baseline.   Psychiatric:          Mood and Affect: Mood normal.         Behavior: Behavior is cooperative.         Thought Content: Thought content normal.          Vents:  Oxygen Concentration (%): (S) 60 (01/15/24 0801)    Lines/Drains/Airways       Drain  Duration                  Urethral Catheter 01/11/24 1900 16 Fr. 3 days              Peripheral Intravenous Line  Duration                  Hemodialysis AV Fistula Right forearm -- days         Peripheral IV - Single Lumen 01/11/24 1620 22 G Anterior;Left Hand 3 days                    Significant Labs:    CBC/Anemia Profile:  Recent Labs   Lab 01/14/24  0624 01/15/24  0819   WBC 12.93* 13.07*   HGB 8.2* 7.6*   HCT 24.6* 23.7*    339   MCV 88 88   RDW 13.8 14.2        Chemistries:  Recent Labs   Lab 01/14/24  0624 01/15/24  0540 01/15/24  0819    133*  --    K 3.6 3.6  --     100  --    CO2 15* 16*  --    BUN 96* 107*  --    CREATININE 4.8* 5.6*  --    CALCIUM 7.3* 7.1*  --    ALBUMIN 2.1* 1.9*  --    MG  --   --  2.5   PHOS 5.0* 6.0*  --        All pertinent labs within the past 24 hours have been reviewed.    Significant Imaging:   I have reviewed all pertinent imaging results/findings within the past 24 hours.    ABG  Recent Labs   Lab 01/12/24  1602   PH 7.383   PO2 60*   PCO2 33.6*   HCO3 20.0*   BE -5*     Assessment/Plan:     Other  Acute hypoxic respiratory failure  Mr. Uriostegui does not wear oxygen at home.   Despite hypoxia and abnormal CT chest, he denies any subjective pulmonary complaints, no sputum production   Multi-factorial with volume overload and possible pneumonia   Diuresis as tolerated   RIP negative, unable to send sputum for culture   Repeat procalcitonin in am   Levaquin   Blood cultures collected with temp 100.8 overnight   Supplemental oxygen as needed to keep SPO2 >/= 88%  Constipation could also be contributing to decreased lung expansion- lactulose x 2  Will use BIPAP at night to help open airways          Thank you for your consult. I will  follow-up with patient. Please contact us if you have any additional questions.     Chayo Trent NP  Pulmonology  [facility] - Telemetry ([facility])

## 2024-01-15 NOTE — PLAN OF CARE
PT EVAL complete. Required MIN A for bed mobility, MOD A of 2 for transfer. Recommending moderate intensity therapy upon d/c.

## 2024-01-15 NOTE — SUBJECTIVE & OBJECTIVE
Past Medical History:   Diagnosis Date    Diabetes mellitus     Hypertension     Stroke 2017/ 2018       Past Surgical History:   Procedure Laterality Date    LEG SURGERY      TOE AMPUTATION         Review of patient's allergies indicates:   Allergen Reactions    Ceftriaxone Itching and Swelling       Family History       Problem Relation (Age of Onset)    Cancer Mother    Diabetes Brother    Hypertension Mother, Father          Tobacco Use    Smoking status: Never    Smokeless tobacco: Never   Substance and Sexual Activity    Alcohol use: Not Currently    Drug use: Not on file    Sexual activity: Not on file         Review of Systems  Reviewed and negative except per HPI.   Objective:     Vital Signs (Most Recent):  Temp: 98.4 °F (36.9 °C) (01/15/24 0724)  Pulse: 81 (01/15/24 0724)  Resp: 19 (01/15/24 0724)  BP: (!) 142/67 (01/15/24 0724)  SpO2: (!) 93 % (01/15/24 0817) Vital Signs (24h Range):  Temp:  [98.4 °F (36.9 °C)-100.8 °F (38.2 °C)] 98.4 °F (36.9 °C)  Pulse:  [80-83] 81  Resp:  [17-19] 19  SpO2:  [91 %-96 %] 93 %  BP: (133-142)/(60-67) 142/67     Weight: 127.2 kg (280 lb 6.8 oz)  Body mass index is 37 kg/m².      Intake/Output Summary (Last 24 hours) at 1/15/2024 1348  Last data filed at 1/15/2024 0644  Gross per 24 hour   Intake --   Output 1200 ml   Net -1200 ml        Physical Exam  Vitals reviewed.   Constitutional:       Appearance: He is obese.   HENT:      Head: Normocephalic and atraumatic.      Mouth/Throat:      Mouth: Mucous membranes are moist.      Pharynx: Oropharynx is clear.   Eyes:      General: No scleral icterus.        Right eye: No discharge.         Left eye: No discharge.   Cardiovascular:      Rate and Rhythm: Normal rate and regular rhythm.   Pulmonary:      Effort: Pulmonary effort is normal.      Comments: Diminished throughout but clear. No wheezing or rales. Vapotherm in place- 30L 60%  Abdominal:      General: Bowel sounds are normal. There is distension.      Palpations:  Abdomen is soft.   Musculoskeletal:         General: No deformity.      Cervical back: Normal range of motion and neck supple.      Right lower leg: Edema present.      Left lower leg: Edema present.   Skin:     General: Skin is warm and dry.      Comments: Bilateral feet with dressings in place.    Neurological:      Mental Status: He is alert and oriented to person, place, and time. Mental status is at baseline.   Psychiatric:         Mood and Affect: Mood normal.         Behavior: Behavior is cooperative.         Thought Content: Thought content normal.          Vents:  Oxygen Concentration (%): (S) 60 (01/15/24 0801)    Lines/Drains/Airways       Drain  Duration                  Urethral Catheter 01/11/24 1900 16 Fr. 3 days              Peripheral Intravenous Line  Duration                  Hemodialysis AV Fistula Right forearm -- days         Peripheral IV - Single Lumen 01/11/24 1620 22 G Anterior;Left Hand 3 days                    Significant Labs:    CBC/Anemia Profile:  Recent Labs   Lab 01/14/24  0624 01/15/24  0819   WBC 12.93* 13.07*   HGB 8.2* 7.6*   HCT 24.6* 23.7*    339   MCV 88 88   RDW 13.8 14.2        Chemistries:  Recent Labs   Lab 01/14/24  0624 01/15/24  0540 01/15/24  0819    133*  --    K 3.6 3.6  --     100  --    CO2 15* 16*  --    BUN 96* 107*  --    CREATININE 4.8* 5.6*  --    CALCIUM 7.3* 7.1*  --    ALBUMIN 2.1* 1.9*  --    MG  --   --  2.5   PHOS 5.0* 6.0*  --        All pertinent labs within the past 24 hours have been reviewed.    Significant Imaging:   I have reviewed all pertinent imaging results/findings within the past 24 hours.

## 2024-01-15 NOTE — PROGRESS NOTES
[facility] ([facility])  Cardiology  Progress Note     SCRIBE #1 NOTE: I, Shannan Mcfarlane, am scribing for, and in the presence of, Dusty Gonsalez MD. I have scribed the entire note.     Patient Name: Moody Silverio  MRN: [id number]  Admission Date: 1/10/2024  Hospital Length of Stay: 2 days  Code Status: Full Code   Attending Physician: Abhijeet Kennedy MD   Primary Care Physician: Jersey Nolan MD  Expected Discharge Date:   Principal Problem:Acute kidney injury superimposed on CKD     Subjective:   HPI:  Mr. Silverio is a 53 year old male patient whose current medical conditions include IDDM, HTN, CKD, prior CVA with residual right-sided hemiparesis, anemia of chronic disease, and anxiety who initially presented to [facility] on 1/8/24 due to SOB/CHF exacerbation, PNA, and CKD. Initial workup revealed BNP of 419, creatinine of 4.55, and anemia (H/H 8.6/27.4). Patient initially required bipap placement but improved after being treated with IV Lasix. He was also treated with abx and nebulizers. Transfer requested for nephrology consultation. Cardiology consulted to assist with management. Patient seen and examined today, resting in bed. Feels ok. Still SOB, on supplemental O2. Denies any current CP symptoms  Chart reviewed. Pro-meredith +. BNP improved to 240. Creatinine 4.7. Repeat CT of chest/abd/pelvis showed small bilateral effusions and multifocal pulmonary opacities possibly related to infection or non-infectious inflammation. TTE pending. Nephrology consulted, await rec's.              Hospital Course:   1/12/24-Patient seen and examined today. More SOB, on Vapotherm. No CP. Started on IV Bumex by nephrology yesterday, still appears overloaded. CXR with worsening appearance. Labs reviewed. Creatinine 4.8. H/H 7.7/24.5.     1/13/24- Patient seen and examined today. Continue diueris with bumex. Output of 5 liters. Bp was elevated. Added Imdur. Increased hydralazine.     1/14/24- No acute events  overnight. Pt is diuresing well. Received 1 unit of blood yesterday. Hemoglobin is 8.2 this morning. Creatinine is stable. BUN is elevated compared to yesterday. Pt remains on high flow NC. BP has improved.      1/15/24  No acute events overnight. Pt remains on high flow naso cannula. Renal function is worse this AM. Remains on Bumex and metolazone. Diuresed 8L so far. Repeat BNP is pending.     Review of Systems   Constitutional: Positive for malaise/fatigue.   HENT: Negative.     Eyes: Negative.    Cardiovascular:  Positive for dyspnea on exertion and leg swelling.   Respiratory:  Positive for shortness of breath.    Endocrine: Negative.    Hematologic/Lymphatic: Negative.    Skin: Negative.    Musculoskeletal: Negative.    Gastrointestinal: Negative.    Genitourinary: Negative.    Neurological:  Positive for weakness.   Psychiatric/Behavioral: Negative.     Allergic/Immunologic: Negative.       Objective:      Vital Signs (Most Recent):  Temp: 99.8 °F (37.7 °C) (01/12/24 1230)  Pulse: 88 (01/12/24 1230)  Resp: 18 (01/12/24 1230)  BP: (!) 173/79 (01/12/24 1230)  SpO2: 95 % (01/12/24 1230) Vital Signs (24h Range):  Temp:  [98 °F (36.7 °C)-99.8 °F (37.7 °C)] 99.8 °F (37.7 °C)  Pulse:  [74-88] 88  Resp:  [16-24] 18  SpO2:  [90 %-98 %] 95 %  BP: (153-177)/(71-79) 173/79      Weight: 126.1 kg (278 lb)  Body mass index is 36.68 kg/m².     SpO2: 95 %           Intake/Output Summary (Last 24 hours) at 1/12/2024 1231  Last data filed at 1/12/2024 1042      Gross per 24 hour   Intake 960 ml   Output 1400 ml   Net -440 ml         Lines/Drains/Airways         Drain  Duration                     Urethral Catheter 01/11/24 1900 16 Fr. <1 day                  Peripheral Intravenous Line  Duration                     Hemodialysis AV Fistula Right forearm -- days          Peripheral IV - Single Lumen 01/11/24 1620 22 G Anterior;Left Hand <1 day                             Physical Exam  Vitals and nursing note reviewed.    Constitutional:       General: He is not in acute distress.     Appearance: He is well-developed. He is ill-appearing. He is not diaphoretic.      Comments: ON vapotherm   HENT:      Head: Normocephalic and atraumatic.   Eyes:      General:         Right eye: No discharge.         Left eye: No discharge.      Pupils: Pupils are equal, round, and reactive to light.   Cardiovascular:      Rate and Rhythm: Normal rate and regular rhythm.      Heart sounds: Normal heart sounds, S1 normal and S2 normal. No murmur heard.  Pulmonary:      Breath sounds: Rhonchi and rales present. No wheezing.   Abdominal:      General: There is no distension.   Musculoskeletal:      Right lower leg: Edema present.      Left lower leg: Edema present.   Skin:     General: Skin is warm and dry.      Findings: No erythema.   Neurological:      General: No focal deficit present.      Mental Status: He is alert and oriented to person, place, and time.   Psychiatric:         Mood and Affect: Mood normal.         Behavior: Behavior normal.         Thought Content: Thought content normal.               Significant Labs: All pertinent lab results from the last 24 hours have been reviewed.     Significant Imaging: All pertinent imaging results from the last 24 hours have been reviewed.    Assessment and Plan:        * Acute kidney injury superimposed on CKD  -Mgmt as per nephrology        Acute hypoxic respiratory failure  -Continue IV diuresis  -Mgmt as per hospital medicine     CHF (congestive heart failure)  -Presents with decompensated CHF  -BNP improving but CT of chest/abdomen/pelvis still with small effusions and bilateral opacities (infectious vs non-infectious inflammation) and still appears overloaded  -Await nephrology rec's  -Continue BB, hydralazine as tolerated  -Strict I's/O's  -TTE pending     1/12/24  -TTE with normal EF, DD  -Still appears overloaded, CXR with worsening appearance, on Vapotherm this AM  -Continue IV  diuresis  -Nephrology following    1/13/24  -Continue IV diuersis   - Add imdur and increase hydralazine   -BP elevated    1/14/24   -Pt is diuresing well.  - Received 1 unit of blood yesterday.  -BUN is elevated compared to yesterday.   -BP has improved.      1/15/24  -Remains on high flow naso cannula  -Remains on Bumex and metolazone   -BNP improved  -BUN creatinine is worse this AM; discuss with nephrology regarding further diuresis  -Diuresing well     Type 2 diabetes mellitus  -Mgmt as per primary team     Normocytic anemia  -Mgmt as per primary team    1/14/24  - Received 1 unit of blood yesterday  -Hemoglobin is 8.2 this morning        Essential hypertension  -Continue Coreg as BP permits  -Continue hydralazine, amlodipine  -Monitor BP trend  -Mgmt as per primary team        Call with changes in CV status, follow up as outpt in CHF clinic and CV clinic       VTE Risk Mitigation (From admission, onward)              Ordered       IP VTE LOW RISK PATIENT  Once         01/10/24 1557       Place sequential compression device  Until discontinued         01/10/24 1557                          Dusty Gonsalez MD   Cardiology  [facility] - Telemetry ([facility])    Scribe Attestation:   Scribe #1: I performed the above scribed service and the documentation accurately describes the services I performed. I attest to the accuracy of the note.     Attending:   Physician Attestation Statement for Scribe #1: I, Dusty Gonsalez MD, personally performed the services described in this documentation, as scribed by Shannan Mcfarlane, in my presence, and it is both accurate and complete.        I have seen the patient, personally interviewed, and examined the patient at bedside.  I have performed the substantive portion of the visit and formulated the assessment and plan as documented by scribe.     Dusty Gonsalez MD, [facility]  Cardiovascular Disease  [facility], [place]  [phone] (P)

## 2024-01-15 NOTE — PROGRESS NOTES
[facility]  [facility]    Patient Name: Moody Silverio  MRN: [id number]  Patient Class: IP- Inpatient   Admission Date: 1/10/2024  Length of Stay: 5 days  Attending Physician: Abhijeet Kennedy MD  Primary Care Provider: Jersey Nolan MD        Subjective:     Principal Problem:Acute kidney injury superimposed on CKD        HPI:  Moody Silverio is 54 y/o male with PMHx IDDM, HTN, CKD, Hx of CVA with right hemiparesis, anemia of chronic disease, BPH, chronic back pain, anxiety who presented to [facility] on 1-8-24 with c/o SOB due to volume overload/CHF exacerbation and pneumonia and also acute on CKD. History obtained from records sent from outside hospital, patient, and family at bedside. Patient remained at outside hospital ER till transfer to [facility] today (1-10-24). Patient arrived to [facility] with Valera in place and on supplemental O2. Patient with right forearm AV shunt. Patient reports continued SOB, constipation, abdominal distention, fatigue.    Outside hospital ER labs/imaging reviewed:  WBC 11  BUN/Cr 77/4.8 (1/8/24); Cr 4.55    H/H 8.6/27.4  CXR with bilateral upper and lower lobe patchy hazy pulmonary opacities either edema or pneumonia (1/9/24)  CT Chest "bilateral pneumonia with an underlying component of volume overload." (1/8/24)    ER course reviewed: patient was initially placed on BiPAP, transitioned to NC (2-3 L), and also required Valera cathter placement due to urinary outlet obstruction. He was treated with pneumonia and CHF exacerbation, received ceftriaxone, azithromycin, doxycycline, DuoNebs, Bumex IV, Lasix IV. Also noted he developed reaction to ceftriaxone, listed as allergy. [facility], accepted as inpatient to [facility] on 1-8-24.    Overview/Hospital Course:  1/11:  Nephrology and cardiology on case.  Recommend Bumex 2 mg q.8h.  Will continue p.o. doxycycline.  " Wean O2 as tolerated.  Echo showed normal EF.  Likely diastolic heart failure exacerbation.  1/12:  Patient requiring high-flow Vapotherm today, will repeat stat chest x-ray.  Continue with diuresis.  Anemia noted, likely to CKD.  Will check iron studies.  Consider EPO and iron replacement.  1/13:  Patient currently on 30 L 60% FiO2.  Continue with diuresis.  Start IV Levaquin for pneumonia.  Anemia again noted, will transfuse 1 unit packed RBCs.  Likely secondary to CKD and iron deficiency.  Start iron replacement therapy.  EPO given.  1/14:  Patient on 30 L 85% FiO2.  Continue diuresis.  Continue Levaquin.  H&H improved status post 1 unit packed RBC.  Continue iron replacement therapy.  1/15: weaned down to 30L 60% fio2. Cont levaquin for pna. H/h trending down, no active signs of bleeding noted. Cont to monitor. Cont on iv bumex and metolazone for diuresis. Pt/OT eval.     Interval History:  No acute issues overnight.      Review of Systems   Constitutional:  Negative for fatigue and fever.   HENT:  Negative for sinus pressure.    Eyes:  Positive for visual disturbance.   Respiratory:  Positive for shortness of breath (Improving).    Cardiovascular:  Positive for leg swelling. Negative for chest pain.   Gastrointestinal:  Negative for nausea and vomiting.   Genitourinary:  Negative for difficulty urinating.   Musculoskeletal:  Negative for back pain.   Skin:  Negative for rash.   Neurological:  Negative for headaches.   Psychiatric/Behavioral:  Negative for confusion.      Objective:     Vital Signs (Most Recent):  Temp: 98.4 °F (36.9 °C) (01/15/24 0724)  Pulse: 81 (01/15/24 0724)  Resp: 19 (01/15/24 0724)  BP: (!) 142/67 (01/15/24 0724)  SpO2: (!) 93 % (01/15/24 0817) Vital Signs (24h Range):  Temp:  [98.2 °F (36.8 °C)-100.8 °F (38.2 °C)] 98.4 °F (36.9 °C)  Pulse:  [75-86] 81  Resp:  [17-20] 19  SpO2:  [90 %-98 %] 93 %  BP: (133-146)/(60-68) 142/67     Weight: 127.2 kg (280 lb 6.8 oz)  Body mass index is 37  kg/m².    Intake/Output Summary (Last 24 hours) at 1/15/2024 0933  Last data filed at 1/15/2024 0644  Gross per 24 hour   Intake --   Output 2000 ml   Net -2000 ml           Physical Exam  Constitutional:       General: He is not in acute distress.     Appearance: He is well-developed. He is obese. He is not ill-appearing or diaphoretic.   HENT:      Head: Normocephalic and atraumatic.   Eyes:      Pupils: Pupils are equal, round, and reactive to light.   Cardiovascular:      Rate and Rhythm: Normal rate and regular rhythm.      Heart sounds: Normal heart sounds. No murmur heard.     No friction rub. No gallop.   Pulmonary:      Effort: Pulmonary effort is normal. No respiratory distress.      Breath sounds: No stridor. Rales present. No wheezing.   Abdominal:      General: Bowel sounds are normal. There is no distension.      Palpations: Abdomen is soft. There is no mass.      Tenderness: There is no abdominal tenderness. There is no guarding.   Musculoskeletal:      Right lower leg: Edema present.      Left lower leg: Edema present.   Skin:     General: Skin is warm.      Findings: No erythema.   Neurological:      Mental Status: He is alert and oriented to person, place, and time.             Significant Labs: All pertinent labs within the past 24 hours have been reviewed.    Significant Imaging: I have reviewed all pertinent imaging results/findings within the past 24 hours.    Assessment/Plan:      * Acute kidney injury superimposed on CKD  Patient with acute kidney injury/acute renal failure likely due to post-obstructive d/t bladder outlet obstruction  ELMIRA is currently worsening. Baseline creatinine unknown - Labs reviewed- Renal function/electrolytes with Estimated Creatinine Clearance: 20.8 mL/min (A) (based on SCr of 5.6 mg/dL (H)). according to latest data. Monitor urine output and serial BMP and adjust therapy as needed. Avoid nephrotoxins and renally dose meds for GFR listed above.    Hold further IV  diuretics for now  Consult Nephrology  Strict I/O's, avoid nephrotoxins  May require HD, has right AVF since Sept '23.    1/15:   Nephrology on case  Continue to monitor renal functions  Continue Bumex  Metolazone added by nephro     Acute hypoxic respiratory failure  Patient with Hypoxic Respiratory failure which is Acute.  he is not on home oxygen. Supplemental oxygen was provided and noted- Oxygen Concentration (%):  [60-80] 60    .   Signs/symptoms of respiratory failure include- tachypnea, increased work of breathing, respiratory distress, and use of accessory muscles. Contributing diagnoses includes - CHF Labs and images were reviewed. Patient Has not had a recent ABG. Will treat underlying causes and adjust management of respiratory failure as follows-     1/15:  Cont levaquin  Cont diuresis   Wean o2 as tolerated  Currently on 30L 60% fio2      CHF (congestive heart failure)  Patient is identified as having  unknown, TTE pending  heart failure that is Acute. CHF is currently uncontrolled due to Rales/crackles on pulmonary exam and Pulmonary edema/pleural effusion on CXR. Latest ECHO performed and demonstrates- No results found for this or any previous visit.  .  Monitor strict Is&Os and daily weights.    Place on fluid restriction of 1.5 L.   Last BNP reviewed- and noted below   Recent Labs   Lab 01/10/24  1631   *       TTE pending  Consult Cardiology    1/15:   Cardiology on case   Echo reviewed   Normal EF   Likely diastolic heart failure exacerbation  Patient still appears overloaded   Will continue Bumex 2 mg q.8h  Metolazone added      Benign prostatic hyperplasia with urinary obstruction  Valera in place on arrival, placed at [facility]  CT C/A/P pending  Consider Urology consult pending CT results  Strict I/O's      Type 2 diabetes mellitus  Patient's FSGs are uncontrolled due to hyperglycemia on current medication regimen.  Last A1c reviewed-   Lab Results   Component Value Date    HGBA1C  6.2 (H) 01/10/2024     Most recent fingerstick glucose reviewed-   Recent Labs   Lab 01/14/24  1157 01/14/24  1640 01/14/24  2125 01/15/24  0542   POCTGLUCOSE 198* 175* 140* 146*       Current correctional scale  Medium  Maintain anti-hyperglycemic dose as follows-   Antihyperglycemics (From admission, onward)      Start     Stop Route Frequency Ordered    01/13/24 0900  insulin detemir U-100 (Levemir) pen 14 Units         -- SubQ Daily 01/13/24 0726    01/10/24 1728  insulin aspart U-100 pen 0-10 Units         -- SubQ Before meals & nightly PRN 01/10/24 1629          Hold Oral hypoglycemics while patient is in the hospital.    1/15  Glucose controlled  Will continue Levemir 14 units   Continue sliding scale    Anemia in chronic kidney disease (CKD)  H/h trending down  No active signs of bleeding  Cont to monitor  Continue p.o. iron supplementation  Nephrology added Venofer   He has received 1 dose EPO this stay    Essential hypertension  Chronic, controlled. Latest blood pressure and vitals reviewed-     Temp:  [98.2 °F (36.8 °C)]   Pulse:  [85-92]   Resp:  [18-22]   BP: (174-180)/(73-75)   SpO2:  [95 %-96 %] .   Home meds for hypertension were reviewed and noted below.   Hypertension Medications               amLODIPine (NORVASC) 10 MG tablet Take 10 mg by mouth once daily.    carvediloL (COREG) 25 MG tablet Take 25 mg by mouth 2 (two) times daily.    furosemide (LASIX) 80 MG tablet Take 80 mg by mouth 2 (two) times daily.    hydrALAZINE (APRESOLINE) 50 MG tablet Take 50 mg by mouth 3 (three) times daily.            While in the hospital, will manage blood pressure as follows; Continue home antihypertensive regimen    Will utilize p.r.n. blood pressure medication only if patient's blood pressure greater than 180/110 and he develops symptoms such as worsening chest pain or shortness of breath.      VTE Risk Mitigation (From admission, onward)           Ordered     enoxaparin injection 30 mg  Every 24 hours          01/14/24 0958     IP VTE LOW RISK PATIENT  Once         01/10/24 1557     Place sequential compression device  Until discontinued         01/10/24 1557                    Discharge Planning   ALONDRA:      Code Status: DNR   Is the patient medically ready for discharge?:     Reason for patient still in hospital (select all that apply): Patient trending condition  Discharge Plan A: Home Health                  Abhijeet Kennedy MD  Department of Hospital Medicine   [facility])

## 2024-01-15 NOTE — PT/OT/SLP EVAL
Physical Therapy Evaluation and Treatment    Patient Name: Moody Silverio   MRN: [id number]  Recent Surgery: * No surgery found *      Recommendations:     Discharge Recommendations: Moderate Intensity Therapy   Discharge Equipment Recommendations: to be determined by next level of care   Barriers to discharge: Decreased caregiver support    Assessment:     Moody Silverio is a 53 y.o. male admitted with a medical diagnosis of Acute kidney injury superimposed on CKD. He presents with the following impairments/functional limitations: weakness, impaired endurance, gait instability, impaired balance, pain, decreased safety awareness, impaired functional mobility, decreased lower extremity function, decreased ROM, impaired cardiopulmonary response to activity, edema, impaired coordination.    Rehab Prognosis: Good; patient would benefit from acute PT services to address these deficits and reach maximum level of function.    Plan:     During this hospitalization, patient to be seen 3 x/week to address the above listed problems via gait training, therapeutic activities, therapeutic exercises    Plan of Care Expires: 01/29/24    Subjective     Chief Complaint: Pt is motivated to participate  Patient Comments/Goals: none stated  Pain/Comfort:  Pain Rating 1: 0/10    Social History:  Living Environment: Patient lives alone in a single story home with number of outside stair(s): ramp  Prior Level of Function: Prior to admission, patient was modified independent for dressing and ADLs, not driving and not working, and ambulated household and community distances using rollator. Reports having a caregiver to assist with bathing.  Equipment Used at Home: bath bench, rollator, wheelchair, cane, straight  DME owned (not currently used): none  Assistance Upon Discharge: family and sitter(s)    Objective:     Communicated with nurse Kirby and epic chart review prior to session. Patient found HOB elevated with peripheral IV, telemetry, vargas  "catheter, bed alarm (vapotherm) upon PT entry to room.    General Precautions: Standard, fall, respiratory   Orthopedic Precautions:  (Pt reported sx shoe use due to B foot wounds)  Braces: N/A    Respiratory Status:  Vapotherm 30L 60%    Exams:  Cognition: Patient is oriented to Person, Place, Time, Situation  RLE ROM: WFL  RLE Strength:  Grossly 4-/5  LLE ROM: WFL  LLE Strength:  Grossly 4-/5  Sensation:    -       Intact  Skin Integrity/Edema:     -       Skin integrity: Visible skin intact  -       Edema: B LE  Pt is legally blind, reports he is able to see objects when close    Functional Mobility:  Gait belt applied - Yes  Bed Mobility  Rolling Right: minimum assistance  Scooting: minimum assistance  Supine to Sit: minimum assistance for LE management and trunk management  Transfers  Sit to Stand: moderate assistance and of 2 persons with hand-held assist  Bed to Chair: moderate assistance and of 2 persons with hand-held assist using Stand Pivot  Gait  Patient able to take 3 small steps to transfer from bed to with hand-held assist and moderate assistance and of 2 persons. Patient demonstrates unsteady gait. No c/o dizziness, mild SOB, educated about pursed lip breathing technique and cued for use with mobility. All lines remained intact throughout ambulation trail. Limited due to vapotherm attached to wall  Balance  Sitting: minimum assistance  Standing: moderate assistance and of 2 persons  SpO2 Desat to 83% with sup>sit and bed>chair transfer, recover quickly to 92% with rest    Therapeutic Activities and Exercises:   Pt educated on role of PT in acute care and POC. Educated on importance of OOB activities, activity pacing, and HEP (marching/hip flex, hip abd, heel slides/LAQ, quad sets, ankle pumps) in order to maintain/regain strength. Encouraged to sit up in chair for all meals. Educated on use of RW for safety and to reduce risk of falling. Educated on "call don't fall" policy and increased risk of " falling due to weakness, instructed to utilize call bell for assistance with all transfers. Pt agreeable to all requests.    AM-PAC 6 CLICK MOBILITY  Total Score:13    Patient left up in chair with all lines intact, call button in reach, chair alarm on, and family present.    GOALS:   Multidisciplinary Problems       Physical Therapy Goals          Problem: Physical Therapy    Goal Priority Disciplines Outcome Goal Variances Interventions   Physical Therapy Goal     PT, PT/OT      Description: Goals to be met by 1/29/24.  1. Pt will complete bed mobility CGA.  2. Pt will complete sit to stand MIN A.  3. Pt will ambulate 50ft MIN A using RW.  4. Pt will increase AMPAC score by 2 points to progress functional mobility.                       History:     Past Medical History:   Diagnosis Date    Diabetes mellitus     Hypertension     Stroke 2017/ 2018       Past Surgical History:   Procedure Laterality Date    LEG SURGERY      TOE AMPUTATION         Time Tracking:     PT Received On: 01/15/24  PT Start Time: 1144  PT Stop Time: 1209  PT Total Time (min): 25 min     Billable Minutes: Evaluation 15min and Therapeutic Activity 10min    1/15/2024

## 2024-01-15 NOTE — ASSESSMENT & PLAN NOTE
Mr. Uriostegui does not wear oxygen at home.   Despite hypoxia and abnormal CT chest, he denies any subjective pulmonary complaints, no sputum production   Multi-factorial with volume overload and possible pneumonia   Diuresis as tolerated   RIP negative, unable to send sputum for culture   Repeat procalcitonin in am   Levaquin   Blood cultures collected with temp 100.8 overnight   Supplemental oxygen as needed to keep SPO2 >/= 88%  Constipation could also be contributing to decreased lung expansion- lactulose x 2  Will use BIPAP at night to help open airways

## 2024-01-15 NOTE — ASSESSMENT & PLAN NOTE
Patient's FSGs are uncontrolled due to hyperglycemia on current medication regimen.  Last A1c reviewed-   Lab Results   Component Value Date    HGBA1C 6.2 (H) 01/10/2024     Most recent fingerstick glucose reviewed-   Recent Labs   Lab 01/14/24  1157 01/14/24  1640 01/14/24  2125 01/15/24  0542   POCTGLUCOSE 198* 175* 140* 146*       Current correctional scale  Medium  Maintain anti-hyperglycemic dose as follows-   Antihyperglycemics (From admission, onward)      Start     Stop Route Frequency Ordered    01/13/24 0900  insulin detemir U-100 (Levemir) pen 14 Units         -- SubQ Daily 01/13/24 0726    01/10/24 1728  insulin aspart U-100 pen 0-10 Units         -- SubQ Before meals & nightly PRN 01/10/24 1629          Hold Oral hypoglycemics while patient is in the hospital.    1/15  Glucose controlled  Will continue Levemir 14 units   Continue sliding scale

## 2024-01-16 PROBLEM — R53.81 DEBILITY: Status: ACTIVE | Noted: 2024-01-01

## 2024-01-16 NOTE — ASSESSMENT & PLAN NOTE
Patient with Hypoxic Respiratory failure which is Acute.  he is not on home oxygen. Supplemental oxygen was provided and noted- Oxygen Concentration (%):  [60-90] 80    .   Signs/symptoms of respiratory failure include- tachypnea, increased work of breathing, respiratory distress, and use of accessory muscles. Contributing diagnoses includes - CHF Labs and images were reviewed. Patient Has not had a recent ABG. Will treat underlying causes and adjust management of respiratory failure as follows-     1/16:  Cont levaquin  Cont diuresis   Wean o2 as tolerated  Required BiPAP overnight  Currently on 36 L 90% FiO2 Vapotherm

## 2024-01-16 NOTE — ASSESSMENT & PLAN NOTE
Patient with acute kidney injury/acute renal failure likely due to post-obstructive d/t bladder outlet obstruction  ELMIRA is currently worsening. Baseline creatinine unknown - Labs reviewed- Renal function/electrolytes with Estimated Creatinine Clearance: 17.4 mL/min (A) (based on SCr of 6.7 mg/dL (H)). according to latest data. Monitor urine output and serial BMP and adjust therapy as needed. Avoid nephrotoxins and renally dose meds for GFR listed above.    Hold further IV diuretics for now  Consult Nephrology  Strict I/O's, avoid nephrotoxins  May require HD, has right AVF since Sept '23.    1/16   Nephrology on case  Continue to monitor renal functions  Continue Bumex  Metolazone added by nephro

## 2024-01-16 NOTE — SUBJECTIVE & OBJECTIVE
Interval History: Pt was seen and examined. Labs and meds reviewed. Discussed with other providers. Switched back form PO to IV bumex yesterday and added metolazone. Pt feels better today. No new events, less SOB.    Review of patient's allergies indicates:   Allergen Reactions    Ceftriaxone Itching and Swelling     Current Facility-Administered Medications   Medication Frequency    0.9%  NaCl infusion (for blood administration) Q24H PRN    amLODIPine tablet 10 mg Daily    atorvastatin tablet 40 mg Nightly    bumetanide injection 2 mg BID    carvediloL tablet 25 mg BID WM    clopidogreL tablet 75 mg Daily    dextrose 10% bolus 125 mL 125 mL PRN    dextrose 10% bolus 125 mL 125 mL PRN    dextrose 10% bolus 250 mL 250 mL PRN    dextrose 10% bolus 250 mL 250 mL PRN    DULoxetine DR capsule 60 mg Daily    enoxaparin injection 30 mg Q24H (prophylaxis, 1700)    ferrous sulfate tablet 1 each Daily    fluticasone propionate 50 mcg/actuation nasal spray 100 mcg Daily    gabapentin capsule 300 mg BID    glucagon (human recombinant) injection 1 mg PRN    glucagon (human recombinant) injection 1 mg PRN    glucose chewable tablet 16 g PRN    glucose chewable tablet 16 g PRN    glucose chewable tablet 24 g PRN    glucose chewable tablet 24 g PRN    hydrALAZINE injection 10 mg Q6H PRN    hydrALAZINE tablet 100 mg TID    insulin aspart U-100 pen 0-10 Units QID (AC + HS) PRN    insulin detemir U-100 (Levemir) pen 14 Units Daily    iron sucrose injection 200 mg Daily    isosorbide mononitrate 24 hr tablet 60 mg Daily    lactulose 20 gram/30 mL solution Soln 30 g Q4H    levoFLOXacin 750 mg/150 mL IVPB 750 mg Q48H    metOLazone tablet 5 mg Daily    mupirocin 2 % ointment BID    naloxone 0.4 mg/mL injection 0.02 mg PRN    polyethylene glycol packet 17 g BID PRN    sodium bicarbonate tablet 650 mg TID    sodium chloride 0.9% flush 10 mL Q12H PRN    tamsulosin 24 hr capsule 0.4 mg Daily    traZODone tablet 100 mg Nightly PRN        Objective:     Vital Signs (Most Recent):  Temp: 97.9 °F (36.6 °C) (01/15/24 1615)  Pulse: 80 (01/15/24 1615)  Resp: 19 (01/15/24 1615)  BP: (!) 141/66 (01/15/24 1615)  SpO2: (!) 90 % (01/15/24 1615) Vital Signs (24h Range):  Temp:  [97.9 °F (36.6 °C)-100.8 °F (38.2 °C)] 97.9 °F (36.6 °C)  Pulse:  [80-83] 80  Resp:  [17-19] 19  SpO2:  [84 %-96 %] 90 %  BP: (133-142)/(60-67) 141/66     Weight: 127.2 kg (280 lb 6.8 oz) (01/15/24 0007)  Body mass index is 37 kg/m².  Body surface area is 2.56 meters squared.    I/O last 3 completed shifts:  In: 581.3 [P.O.:220; Blood:361.3]  Out: 3625 [Urine:3625]     Physical Exam  Vitals and nursing note reviewed.   Constitutional:       Appearance: Normal appearance.   Cardiovascular:      Rate and Rhythm: Normal rate and regular rhythm.   Pulmonary:      Effort: Pulmonary effort is normal.      Breath sounds: Rales present.   Musculoskeletal:      Right lower leg: Edema present.      Left lower leg: Edema present.   Neurological:      Mental Status: He is alert.          Significant Labs: reviewed  BMP  Lab Results   Component Value Date     (L) 01/15/2024    K 3.6 01/15/2024     01/15/2024    CO2 16 (L) 01/15/2024     (H) 01/15/2024    CREATININE 5.6 (H) 01/15/2024    CALCIUM 7.1 (L) 01/15/2024    ANIONGAP 17 (H) 01/15/2024    EGFRNORACEVR 11 (A) 01/15/2024     Lab Results   Component Value Date    WBC 13.07 (H) 01/15/2024    HGB 7.6 (L) 01/15/2024    HCT 23.7 (L) 01/15/2024    MCV 88 01/15/2024     01/15/2024       Lab Results   Component Value Date    IRON 13 (L) 01/12/2024    TRANSFERRIN 123 (L) 01/12/2024    TIBC 182 (L) 01/12/2024    FESATURATED 7 (L) 01/12/2024        Significant Imaging: reviewed CXR, pul edema, last CXR was 3 days ago

## 2024-01-16 NOTE — CONSULTS
[facility] - Tele[facility] ([facility])  Pulmonology Progress Note    Patient Name: Moody Silverio  MRN: [id number]  Admission Date: 1/10/2024  Hospital Length of Stay: 6 days  Code Status: DNR  Attending Physician: Abhijeet Kennedy MD  Primary Care Provider: Jersey Nolan MD   Principal Problem: Acute kidney injury superimposed on CKD      Subjective:     HPI:  53-year-old male with a known past medical history of CKD IV, HTN, BPH, CHF, anemia, prior CVA, blindness, DM, and foot wounds who was admitted by [facility] medicine 1/8/2024 for treatment of volume overload, CHF exacerbation and pneumonia. He initially presented to [facility] in [place], [place] and was treated with BiPAP, IV bumex, antibiotics, and nebulizer treatments. Transfer was requested for Nephrology services who were consulted and have been following since admission. Pulmonary team consulted for evaluation and additional recommendations for ongoing hypoxia requiring vapotherm despite IV diuretics and antibiotics.     Upon exam, Mr. Uriostegui is lying in bed and appears to be breathing comfortably with vapotherm in place. He denies any subjective complaints and reports feeling better. Tmax the past 24 hours 100.8. Sputum culture ordered 1/11 but patient is not producing any for sampling. Patient reports no BM in a few days with soft but distended abdomen. Denies history of known TANA or prior recommendation or or use of CPAP.     Hospital Course/Interval History:   1/16: continues to deny any pulmonary symptoms despite stable, maybe even a little progression of bilateral opacities on CXR. Increased Vapotherm requirements today. Renal has offered to initiate HD at this point, patient was given time to make a decision and tells me that he is ready to start. WBC normal, no fevers, procal down trending     Past Medical History:   Diagnosis Date    Diabetes mellitus     Hypertension     Stroke 2017/ 2018       Past Surgical History:   Procedure Laterality Date    LEG SURGERY       TOE AMPUTATION         Review of patient's allergies indicates:   Allergen Reactions    Ceftriaxone Itching and Swelling       Family History       Problem Relation (Age of Onset)    Cancer Mother    Diabetes Brother    Hypertension Mother, Father          Tobacco Use    Smoking status: Never    Smokeless tobacco: Never   Substance and Sexual Activity    Alcohol use: Not Currently    Drug use: Not on file    Sexual activity: Not on file         Review of Systems  Reviewed and negative except per hospital course/interval history   Objective:     Vital Signs (Most Recent):  Temp: 98.9 °F (37.2 °C) (01/16/24 1238)  Pulse: 84 (01/16/24 1238)  Resp: 19 (01/16/24 1238)  BP: (!) 158/72 (01/16/24 1238)  SpO2: (!) 93 % (01/16/24 1238) Vital Signs (24h Range):  Temp:  [98.2 °F (36.8 °C)-98.9 °F (37.2 °C)] 98.9 °F (37.2 °C)  Pulse:  [81-87] 84  Resp:  [19-22] 19  SpO2:  [92 %-99 %] 93 %  BP: (133-158)/(62-72) 158/72     Weight: 127.4 kg (280 lb 13.9 oz)  Body mass index is 37.06 kg/m².      Intake/Output Summary (Last 24 hours) at 1/16/2024 1643  Last data filed at 1/16/2024 0458  Gross per 24 hour   Intake --   Output 400 ml   Net -400 ml        Physical Exam  Vitals reviewed.   Constitutional:       Appearance: He is ill-appearing.   HENT:      Head: Normocephalic and atraumatic.      Mouth/Throat:      Mouth: Mucous membranes are moist.      Pharynx: Oropharynx is clear.   Eyes:      General: No scleral icterus.     Conjunctiva/sclera: Conjunctivae normal.   Cardiovascular:      Rate and Rhythm: Normal rate and regular rhythm.   Pulmonary:      Effort: Pulmonary effort is normal.      Comments: Equal chest rise. No conversational dyspnea. Vapotherm- 35L 80%  Abdominal:      General: There is no distension.      Palpations: Abdomen is soft.   Musculoskeletal:      Cervical back: Normal range of motion and neck supple.      Right lower leg: Edema present.      Left lower leg: Edema present.   Skin:     General: Skin is warm  and dry.      Comments: Bilateral foot dressings in place   Neurological:      Mental Status: He is alert. Mental status is at baseline.      Comments: Right hemiplegia          Lines/Drains/Airways       Drain  Duration                  Urethral Catheter 01/11/24 1900 16 Fr. 4 days              Peripheral Intravenous Line  Duration                  Hemodialysis AV Fistula Right forearm -- days         Peripheral IV - Single Lumen 01/11/24 1620 22 G Anterior;Left Hand 5 days                    Significant Labs:    CBC/Anemia Profile:  Recent Labs   Lab 01/15/24  0819 01/16/24  0419   WBC 13.07* 12.54   HGB 7.6* 7.5*   HCT 23.7* 23.3*    356   MCV 88 88   RDW 14.2 14.1        Chemistries:  Recent Labs   Lab 01/15/24  0540 01/15/24  0819 01/16/24  0419   *  --  132*   K 3.6  --  4.1     --  98   CO2 16*  --  18*   *  --  114*   CREATININE 5.6*  --  6.7*   CALCIUM 7.1*  --  7.1*   ALBUMIN 1.9*  --  1.9*   MG  --  2.5  --    PHOS 6.0*  --  6.8*       All pertinent labs within the past 24 hours have been reviewed.    Significant Imaging:   I have reviewed all pertinent imaging results/findings within the past 24 hours.    ABG  Recent Labs   Lab 01/12/24  1602   PH 7.383   PO2 60*   PCO2 33.6*   HCO3 20.0*   BE -5*     Assessment/Plan:     Other  Acute hypoxic respiratory failure  Mr. Uriostegui does not wear oxygen at home.   Despite hypoxia and abnormal CT chest, he denies any subjective pulmonary complaints, no sputum production   Multi-factorial with volume overload and possible pneumonia   Diuresis as tolerated - has been dependent on IV diuretics with ongoing volume overload- offered and willing to initiate HD- hopefully this will help with oxygen weaning   RIP negative, unable to send sputum for culture   Repeat procalcitonin down trending  Levaquin - 5 day course should be sufficient   Blood cultures pending  Supplemental oxygen as needed to keep SPO2 >/= 88%  Will use BIPAP at night to help  open airways         Chayo Trent NP  Pulmonology  [facility] - Telemetry ([facility])

## 2024-01-16 NOTE — ASSESSMENT & PLAN NOTE
Patient is identified as having  unknown, TTE pending  heart failure that is Acute. CHF is currently uncontrolled due to Rales/crackles on pulmonary exam and Pulmonary edema/pleural effusion on CXR. Latest ECHO performed and demonstrates- No results found for this or any previous visit.  .  Monitor strict Is&Os and daily weights.    Place on fluid restriction of 1.5 L.   Last BNP reviewed- and noted below   Recent Labs   Lab 01/15/24  0819   *       TTE pending  Consult Cardiology    1/16:   Cardiology on case   Echo reviewed   Normal EF   Likely diastolic heart failure exacerbation  Patient still appears overloaded   Will continue Bumex 2 mg q.8h  Metolazone

## 2024-01-16 NOTE — ASSESSMENT & PLAN NOTE
Mr. Uriostegui does not wear oxygen at home.   Despite hypoxia and abnormal CT chest, he denies any subjective pulmonary complaints, no sputum production   Multi-factorial with volume overload and possible pneumonia   Diuresis as tolerated - has been dependent on IV diuretics with ongoing volume overload- offered and willing to initiate HD- hopefully this will help with oxygen weaning   RIP negative, unable to send sputum for culture   Repeat procalcitonin down trending  Levaquin - 5 day course should be sufficient   Blood cultures pending  Supplemental oxygen as needed to keep SPO2 >/= 88%  Will use BIPAP at night to help open airways

## 2024-01-16 NOTE — PLAN OF CARE
PATIENT PRACTICED BED MOBILITY, SITTING BALANCE EOB AND BUE SROM SEATED EOB.  PATIENT ALSO PERFORMED FACE WASHING AND ORAL HYGIENE SUPINE WITH HOB ELEVATED DUE TO C/O FATIGUE AND SOB AFTER ACTIVITY SEATED EOB.  DTR PRESENT DURING THERAPY AND WAS ADVISED TO HAVE PATIENT WASH HIS FACE AND PERFORM ORAL HYGIENE EVERYDAY WHICH WILL MAKE PATIENT FEEL BETTER AS WELL AS PERFORMING THESE TASKS WILL ENCOURAGE MOVEMENT INSTEAD OF PATIENT JUST LYING IN BED.  PROPER BREATHING TECHNIQUE ALSO PRACTICED DUE TO PATIENT C/O SOB.

## 2024-01-16 NOTE — ASSESSMENT & PLAN NOTE
54 y/o male with CKD stage 5 presented with volume overload:     Stage 5 chronic kidney disease due to type 2 diabetes mellitus  Cr stable and unchanged this admit  Stable renal function, CKD stage 5  CKD likely due to DM and HTN     Complications of CKD:  K normal  Mild hyponatremia, improved  Metabolic acidosis, worse  Mild hypocalcemia, stable  PO4 not high  iPTH pending  Anemia, on epogen. Low iron sat, on venofer IV loading dose     Volume overload. Pulmonary edema:  Pt had responded well to IV diuretics for fluid gain, switched bumex from IV to po  Worse now  Will switch back to IV  Will add metolazone 5 mg po qd  Hemodynamically stable      CHF (congestive heart failure)  Reviewed the chart. See above for diuretics mgmt  Pulmonary edema  Good response to diuretics  Will monitor     Essential hypertension  BP much improved after adding clonidine  Appears too sleepy, may eb from clonidine  Will stop clonidine, as BP has much improved and diuretics were being increased  Meds reviewed     Type 2 diabetes mellitus  Long standing  Reviewed the chart     Plans and recommendations:  As discussed above  Total time spent 40 minutes including time needed to review the records, the   patient evaluation, documentation, face-to-face discussion with the patient,   more than 50% of the time was spent on coordination of care and counseling   PCP out, patient called asking for labs. Labs show    -fairly stable prediabetes  -slight worsening of cholesterol levels  -kidney labs slight worsening, likely due to uncontrolled high blood pressure  -due for 3 week f/u for BP as per Dr. Gray note

## 2024-01-16 NOTE — SUBJECTIVE & OBJECTIVE
Past Medical History:   Diagnosis Date    Diabetes mellitus     Hypertension     Stroke 2017/ 2018       Past Surgical History:   Procedure Laterality Date    LEG SURGERY      TOE AMPUTATION         Review of patient's allergies indicates:   Allergen Reactions    Ceftriaxone Itching and Swelling       Family History       Problem Relation (Age of Onset)    Cancer Mother    Diabetes Brother    Hypertension Mother, Father          Tobacco Use    Smoking status: Never    Smokeless tobacco: Never   Substance and Sexual Activity    Alcohol use: Not Currently    Drug use: Not on file    Sexual activity: Not on file         Review of Systems  Reviewed and negative except per hospital course/interval history   Objective:     Vital Signs (Most Recent):  Temp: 98.9 °F (37.2 °C) (01/16/24 1238)  Pulse: 84 (01/16/24 1238)  Resp: 19 (01/16/24 1238)  BP: (!) 158/72 (01/16/24 1238)  SpO2: (!) 93 % (01/16/24 1238) Vital Signs (24h Range):  Temp:  [98.2 °F (36.8 °C)-98.9 °F (37.2 °C)] 98.9 °F (37.2 °C)  Pulse:  [81-87] 84  Resp:  [19-22] 19  SpO2:  [92 %-99 %] 93 %  BP: (133-158)/(62-72) 158/72     Weight: 127.4 kg (280 lb 13.9 oz)  Body mass index is 37.06 kg/m².      Intake/Output Summary (Last 24 hours) at 1/16/2024 1643  Last data filed at 1/16/2024 0458  Gross per 24 hour   Intake --   Output 400 ml   Net -400 ml        Physical Exam  Vitals reviewed.   Constitutional:       Appearance: He is ill-appearing.   HENT:      Head: Normocephalic and atraumatic.      Mouth/Throat:      Mouth: Mucous membranes are moist.      Pharynx: Oropharynx is clear.   Eyes:      General: No scleral icterus.     Conjunctiva/sclera: Conjunctivae normal.   Cardiovascular:      Rate and Rhythm: Normal rate and regular rhythm.   Pulmonary:      Effort: Pulmonary effort is normal.      Comments: Equal chest rise. No conversational dyspnea. Vapotherm- 35L 80%  Abdominal:      General: There is no distension.      Palpations: Abdomen is soft.    Musculoskeletal:      Cervical back: Normal range of motion and neck supple.      Right lower leg: Edema present.      Left lower leg: Edema present.   Skin:     General: Skin is warm and dry.      Comments: Bilateral foot dressings in place   Neurological:      Mental Status: He is alert. Mental status is at baseline.      Comments: Right hemiplegia          Lines/Drains/Airways       Drain  Duration                  Urethral Catheter 01/11/24 1900 16 Fr. 4 days              Peripheral Intravenous Line  Duration                  Hemodialysis AV Fistula Right forearm -- days         Peripheral IV - Single Lumen 01/11/24 1620 22 G Anterior;Left Hand 5 days                    Significant Labs:    CBC/Anemia Profile:  Recent Labs   Lab 01/15/24  0819 01/16/24  0419   WBC 13.07* 12.54   HGB 7.6* 7.5*   HCT 23.7* 23.3*    356   MCV 88 88   RDW 14.2 14.1        Chemistries:  Recent Labs   Lab 01/15/24  0540 01/15/24  0819 01/16/24  0419   *  --  132*   K 3.6  --  4.1     --  98   CO2 16*  --  18*   *  --  114*   CREATININE 5.6*  --  6.7*   CALCIUM 7.1*  --  7.1*   ALBUMIN 1.9*  --  1.9*   MG  --  2.5  --    PHOS 6.0*  --  6.8*       All pertinent labs within the past 24 hours have been reviewed.    Significant Imaging:   I have reviewed all pertinent imaging results/findings within the past 24 hours.

## 2024-01-16 NOTE — PROGRESS NOTES
[facility] ([facility])  Nephrology  Progress Note    Patient Name: Moody Silverio  MRN: [id number]  Admission Date: 1/10/2024  Hospital Length of Stay: 5 days  Attending Provider: Abhijeet Kennedy MD   Primary Care Physician: Jersey Nolan MD  Principal Problem:Acute kidney injury superimposed on CKD    Subjective:     HPI: noted    Interval History: Pt was seen and examined. Labs and meds reviewed. Discussed with other providers. Switched back form PO to IV bumex yesterday and added metolazone. Pt feels better today. No new events, less SOB.    Review of patient's allergies indicates:   Allergen Reactions    Ceftriaxone Itching and Swelling     Current Facility-Administered Medications   Medication Frequency    0.9%  NaCl infusion (for blood administration) Q24H PRN    amLODIPine tablet 10 mg Daily    atorvastatin tablet 40 mg Nightly    bumetanide injection 2 mg BID    carvediloL tablet 25 mg BID WM    clopidogreL tablet 75 mg Daily    dextrose 10% bolus 125 mL 125 mL PRN    dextrose 10% bolus 125 mL 125 mL PRN    dextrose 10% bolus 250 mL 250 mL PRN    dextrose 10% bolus 250 mL 250 mL PRN    DULoxetine DR capsule 60 mg Daily    enoxaparin injection 30 mg Q24H (prophylaxis, 1700)    ferrous sulfate tablet 1 each Daily    fluticasone propionate 50 mcg/actuation nasal spray 100 mcg Daily    gabapentin capsule 300 mg BID    glucagon (human recombinant) injection 1 mg PRN    glucagon (human recombinant) injection 1 mg PRN    glucose chewable tablet 16 g PRN    glucose chewable tablet 16 g PRN    glucose chewable tablet 24 g PRN    glucose chewable tablet 24 g PRN    hydrALAZINE injection 10 mg Q6H PRN    hydrALAZINE tablet 100 mg TID    insulin aspart U-100 pen 0-10 Units QID (AC + HS) PRN    insulin detemir U-100 (Levemir) pen 14 Units Daily    iron sucrose injection 200 mg Daily    isosorbide mononitrate 24 hr tablet 60 mg Daily    lactulose 20 gram/30 mL solution Soln 30 g Q4H    levoFLOXacin 750 mg/150 mL IVPB 750  mg Q48H    metOLazone tablet 5 mg Daily    mupirocin 2 % ointment BID    naloxone 0.4 mg/mL injection 0.02 mg PRN    polyethylene glycol packet 17 g BID PRN    sodium bicarbonate tablet 650 mg TID    sodium chloride 0.9% flush 10 mL Q12H PRN    tamsulosin 24 hr capsule 0.4 mg Daily    traZODone tablet 100 mg Nightly PRN       Objective:     Vital Signs (Most Recent):  Temp: 97.9 °F (36.6 °C) (01/15/24 1615)  Pulse: 80 (01/15/24 1615)  Resp: 19 (01/15/24 1615)  BP: (!) 141/66 (01/15/24 1615)  SpO2: (!) 90 % (01/15/24 1615) Vital Signs (24h Range):  Temp:  [97.9 °F (36.6 °C)-100.8 °F (38.2 °C)] 97.9 °F (36.6 °C)  Pulse:  [80-83] 80  Resp:  [17-19] 19  SpO2:  [84 %-96 %] 90 %  BP: (133-142)/(60-67) 141/66     Weight: 127.2 kg (280 lb 6.8 oz) (01/15/24 0007)  Body mass index is 37 kg/m².  Body surface area is 2.56 meters squared.    I/O last 3 completed shifts:  In: 581.3 [P.O.:220; Blood:361.3]  Out: 3625 [Urine:3625]     Physical Exam  Vitals and nursing note reviewed.   Constitutional:       Appearance: Normal appearance.   Cardiovascular:      Rate and Rhythm: Normal rate and regular rhythm.   Pulmonary:      Effort: Pulmonary effort is normal.      Breath sounds: Rales present.   Musculoskeletal:      Right lower leg: Edema present.      Left lower leg: Edema present.   Neurological:      Mental Status: He is alert.          Significant Labs: reviewed  BMP  Lab Results   Component Value Date     (L) 01/15/2024    K 3.6 01/15/2024     01/15/2024    CO2 16 (L) 01/15/2024     (H) 01/15/2024    CREATININE 5.6 (H) 01/15/2024    CALCIUM 7.1 (L) 01/15/2024    ANIONGAP 17 (H) 01/15/2024    EGFRNORACEVR 11 (A) 01/15/2024     Lab Results   Component Value Date    WBC 13.07 (H) 01/15/2024    HGB 7.6 (L) 01/15/2024    HCT 23.7 (L) 01/15/2024    MCV 88 01/15/2024     01/15/2024       Lab Results   Component Value Date    IRON 13 (L) 01/12/2024    TRANSFERRIN 123 (L) 01/12/2024    TIBC 182 (L)  01/12/2024    FESATURATED 7 (L) 01/12/2024        Significant Imaging: reviewed CXR, pul edema, last CXR was 3 days ago  Assessment/Plan:     52 y/o male with CKD stage 5 presented with volume overload:     Stage 5 chronic kidney disease due to type 2 diabetes mellitus  Cr stable, a little higher because of increased diuretics  Stable renal function, CKD stage 5  CKD likely due to DM and HTN     Complications of CKD:  K normal  Mild hyponatremia,  Metabolic acidosis, moderate, stable  Mild hypocalcemia, stable  PO4 not high  iPTH pending  Anemia, on epogen. Low iron sat, on venofer IV loading dose     Volume overload. Pulmonary edema:  Pt has responds well to high dose diuretics, but not to po diuretics  This may mean he needs to start HD sooner  Will discuss with family      CHF (congestive heart failure)  Reviewed the chart. See above for diuretics mgmt  Pulmonary edema  Good response to diuretics     Essential hypertension  BP controlled and improved  Meds reviewed     Type 2 diabetes mellitus  Long standing  Reviewed the chart     Plans and recommendations:  As discussed above  Total time spent 40 minutes including time needed to review the records, the   patient evaluation, documentation, face-to-face discussion with the patient,   more than 50% of the time was spent on coordination of care and counseling        Thank you for your consult.     Laya Bartlett MD  Nephrology  [facility] ([facility])

## 2024-01-16 NOTE — SUBJECTIVE & OBJECTIVE
Interval History:  Patient wore BiPAP temporarily overnight.  Currently denies any issues.    Review of Systems   Constitutional:  Negative for fatigue and fever.   HENT:  Negative for sinus pressure.    Eyes:  Positive for visual disturbance.   Respiratory:  Positive for shortness of breath (Improving).    Cardiovascular:  Positive for leg swelling. Negative for chest pain.   Gastrointestinal:  Negative for nausea and vomiting.   Genitourinary:  Negative for difficulty urinating.   Musculoskeletal:  Negative for back pain.   Skin:  Negative for rash.   Neurological:  Negative for headaches.   Psychiatric/Behavioral:  Negative for confusion.      Objective:     Vital Signs (Most Recent):  Temp: 98.7 °F (37.1 °C) (01/16/24 0808)  Pulse: 87 (01/16/24 0808)  Resp: 19 (01/16/24 0808)  BP: (!) 147/70 (01/16/24 0808)  SpO2: (!) 94 % (01/16/24 0809) Vital Signs (24h Range):  Temp:  [97.9 °F (36.6 °C)-98.7 °F (37.1 °C)] 98.7 °F (37.1 °C)  Pulse:  [80-87] 87  Resp:  [19-22] 19  SpO2:  [84 %-99 %] 94 %  BP: (133-154)/(62-70) 147/70     Weight: 127.4 kg (280 lb 13.9 oz)  Body mass index is 37.06 kg/m².    Intake/Output Summary (Last 24 hours) at 1/16/2024 0913  Last data filed at 1/16/2024 0458  Gross per 24 hour   Intake 296.13 ml   Output 400 ml   Net -103.87 ml         Physical Exam  Constitutional:       General: He is not in acute distress.     Appearance: He is well-developed. He is obese. He is not ill-appearing or diaphoretic.   HENT:      Head: Normocephalic and atraumatic.   Eyes:      Pupils: Pupils are equal, round, and reactive to light.   Cardiovascular:      Rate and Rhythm: Normal rate and regular rhythm.      Heart sounds: Normal heart sounds. No murmur heard.     No friction rub. No gallop.   Pulmonary:      Effort: Pulmonary effort is normal. No respiratory distress.      Breath sounds: No stridor. Rales present. No wheezing.   Abdominal:      General: Bowel sounds are normal. There is no distension.       Palpations: Abdomen is soft. There is no mass.      Tenderness: There is no abdominal tenderness. There is no guarding.   Musculoskeletal:      Right lower leg: Edema present.      Left lower leg: Edema present.   Skin:     General: Skin is warm.      Findings: No erythema.   Neurological:      Mental Status: He is alert and oriented to person, place, and time.             Significant Labs: All pertinent labs within the past 24 hours have been reviewed.    Significant Imaging: I have reviewed all pertinent imaging results/findings within the past 24 hours.

## 2024-01-16 NOTE — ASSESSMENT & PLAN NOTE
Patient's FSGs are uncontrolled due to hyperglycemia on current medication regimen.  Last A1c reviewed-   Lab Results   Component Value Date    HGBA1C 6.2 (H) 01/10/2024     Most recent fingerstick glucose reviewed-   Recent Labs   Lab 01/15/24  1621 01/15/24  2019 01/16/24  0633   POCTGLUCOSE 184* 188* 161*       Current correctional scale  Medium  Maintain anti-hyperglycemic dose as follows-   Antihyperglycemics (From admission, onward)      Start     Stop Route Frequency Ordered    01/13/24 0900  insulin detemir U-100 (Levemir) pen 14 Units         -- SubQ Daily 01/13/24 0726    01/10/24 1728  insulin aspart U-100 pen 0-10 Units         -- SubQ Before meals & nightly PRN 01/10/24 1629          Hold Oral hypoglycemics while patient is in the hospital.      Glucose controlled  Will continue Levemir 14 units   Continue sliding scale

## 2024-01-16 NOTE — PLAN OF CARE
Problem: Adult Inpatient Plan of Care  Goal: Plan of Care Review  Outcome: Ongoing, Progressing  Goal: Patient-Specific Goal (Individualized)  Outcome: Ongoing, Progressing  Goal: Absence of Hospital-Acquired Illness or Injury  Outcome: Ongoing, Progressing  Goal: Optimal Comfort and Wellbeing  Outcome: Ongoing, Progressing  Goal: Readiness for Transition of Care  Outcome: Ongoing, Progressing     Problem: Diabetes Comorbidity  Goal: Blood Glucose Level Within Targeted Range  Outcome: Ongoing, Progressing     Problem: Fluid and Electrolyte Imbalance (Acute Kidney Injury/Impairment)  Goal: Fluid and Electrolyte Balance  Outcome: Ongoing, Progressing     Problem: Oral Intake Inadequate (Acute Kidney Injury/Impairment)  Goal: Optimal Nutrition Intake  Outcome: Ongoing, Progressing     Problem: Renal Function Impairment (Acute Kidney Injury/Impairment)  Goal: Effective Renal Function  Outcome: Ongoing, Progressing     Problem: Skin Injury Risk Increased  Goal: Skin Health and Integrity  Outcome: Ongoing, Progressing     Problem: Impaired Wound Healing  Goal: Optimal Wound Healing  Outcome: Ongoing, Progressing     Problem: Infection  Goal: Absence of Infection Signs and Symptoms  Outcome: Ongoing, Progressing     Problem: Fall Injury Risk  Goal: Absence of Fall and Fall-Related Injury  Outcome: Ongoing, Progressing

## 2024-01-16 NOTE — PROGRESS NOTES
"[facility]  Progress Note    Patient Name: Moody Silverio  MRN: [id number]  Patient Class: IP- Inpatient   Admission Date: 1/10/2024  Length of Stay: 6 days  Attending Physician: Abhijeet Kennedy MD  Primary Care Provider: Jersey Nolan MD        Subjective:     Principal Problem:Acute kidney injury superimposed on CKD        HPI:  Moody Silverio is 52 y/o male with PMHx IDDM, HTN, CKD, Hx of CVA with right hemiparesis, anemia of chronic disease, BPH, chronic back pain, anxiety who presented to [facility] with c/o SOB due to volume overload/CHF exacerbation and pneumonia and also acute on CKD. History obtained from records sent from outside hospital, patient, and family at bedside. Patient remained at outside hospital ER till transfer to [facility] today (1-10-24). Patient arrived to [facility] with Valera in place and on supplemental O2. Patient with right forearm AV shunt. Patient reports continued SOB, constipation, abdominal distention, fatigue.    Outside hospital ER labs/imaging reviewed:  WBC 11  BUN/Cr 77/4.8 (1/8/24); Cr 4.55    H/H 8.6/27.4  CXR with bilateral upper and lower lobe patchy hazy pulmonary opacities either edema or pneumonia (1/9/24)  CT Chest "bilateral pneumonia with an underlying component of volume overload." (1/8/24)    ER course reviewed: patient was initially placed on BiPAP, transitioned to NC (2-3 L), and also required Valera cathter placement due to urinary outlet obstruction. He was treated with pneumonia and CHF exacerbation, received ceftriaxone, azithromycin, doxycycline, DuoNebs, Bumex IV, Lasix IV. Also noted he developed reaction to ceftriaxone, listed as allergy[facility], accepted as inpatient to [facility] on 1-8-24.    Overview/Hospital Course:  1/11:  Nephrology and cardiology on case.  Recommend Bumex 2 mg q.8h.  Will continue p.o. doxycycline.  " Wean O2 as tolerated.  Echo showed normal EF.  Likely diastolic heart failure exacerbation.  1/12:  Patient requiring high-flow Vapotherm today, will repeat stat chest x-ray.  Continue with diuresis.  Anemia noted, likely to CKD.  Will check iron studies.  Consider EPO and iron replacement.  1/13:  Patient currently on 30 L 60% FiO2.  Continue with diuresis.  Start IV Levaquin for pneumonia.  Anemia again noted, will transfuse 1 unit packed RBCs.  Likely secondary to CKD and iron deficiency.  Start iron replacement therapy.  EPO given.  1/14:  Patient on 30 L 85% FiO2.  Continue diuresis.  Continue Levaquin.  H&H improved status post 1 unit packed RBC.  Continue iron replacement therapy.  1/15: weaned down to 30L 60% fio2. Cont levaquin for pna. H/h trending down, no active signs of bleeding noted. Cont to monitor. Cont on iv bumex and metolazone for diuresis. Pt/OT eval.   1/16:.  Patient required BiPAP overnight however only wore it for a few hours.  Currently on 36 L 90% Vapotherm.  Pulmonology on case.  Continue diuresis.  Nephrology on case.  Repeat chest x-ray today.  Continue Levaquin.  PT/OT recommending SNF placement.    Interval History:  Patient wore BiPAP temporarily overnight.  Currently denies any issues.    Review of Systems   Constitutional:  Negative for fatigue and fever.   HENT:  Negative for sinus pressure.    Eyes:  Positive for visual disturbance.   Respiratory:  Positive for shortness of breath (Improving).    Cardiovascular:  Positive for leg swelling. Negative for chest pain.   Gastrointestinal:  Negative for nausea and vomiting.   Genitourinary:  Negative for difficulty urinating.   Musculoskeletal:  Negative for back pain.   Skin:  Negative for rash.   Neurological:  Negative for headaches.   Psychiatric/Behavioral:  Negative for confusion.      Objective:     Vital Signs (Most Recent):  Temp: 98.7 °F (37.1 °C) (01/16/24 0808)  Pulse: 87 (01/16/24 0808)  Resp: 19 (01/16/24 0808)  BP: (!)  147/70 (01/16/24 0808)  SpO2: (!) 94 % (01/16/24 0809) Vital Signs (24h Range):  Temp:  [97.9 °F (36.6 °C)-98.7 °F (37.1 °C)] 98.7 °F (37.1 °C)  Pulse:  [80-87] 87  Resp:  [19-22] 19  SpO2:  [84 %-99 %] 94 %  BP: (133-154)/(62-70) 147/70     Weight: 127.4 kg (280 lb 13.9 oz)  Body mass index is 37.06 kg/m².    Intake/Output Summary (Last 24 hours) at 1/16/2024 0927  Last data filed at 1/16/2024 0458  Gross per 24 hour   Intake 296.13 ml   Output 400 ml   Net -103.87 ml         Physical Exam  Constitutional:       General: He is not in acute distress.     Appearance: He is well-developed. He is obese. He is not ill-appearing or diaphoretic.   HENT:      Head: Normocephalic and atraumatic.   Eyes:      Pupils: Pupils are equal, round, and reactive to light.   Cardiovascular:      Rate and Rhythm: Normal rate and regular rhythm.      Heart sounds: Normal heart sounds. No murmur heard.     No friction rub. No gallop.   Pulmonary:      Effort: Pulmonary effort is normal. No respiratory distress.      Breath sounds: No stridor. Rales present. No wheezing.   Abdominal:      General: Bowel sounds are normal. There is no distension.      Palpations: Abdomen is soft. There is no mass.      Tenderness: There is no abdominal tenderness. There is no guarding.   Musculoskeletal:      Right lower leg: Edema present.      Left lower leg: Edema present.   Skin:     General: Skin is warm.      Findings: No erythema.   Neurological:      Mental Status: He is alert and oriented to person, place, and time.             Significant Labs: All pertinent labs within the past 24 hours have been reviewed.    Significant Imaging: I have reviewed all pertinent imaging results/findings within the past 24 hours.    Assessment/Plan:      * Acute kidney injury superimposed on CKD  Patient with acute kidney injury/acute renal failure likely due to post-obstructive d/t bladder outlet obstruction  ELMIRA is currently worsening. Baseline creatinine unknown  - Labs reviewed- Renal function/electrolytes with Estimated Creatinine Clearance: 17.4 mL/min (A) (based on SCr of 6.7 mg/dL (H)). according to latest data. Monitor urine output and serial BMP and adjust therapy as needed. Avoid nephrotoxins and renally dose meds for GFR listed above.    Hold further IV diuretics for now  Consult Nephrology  Strict I/O's, avoid nephrotoxins  May require HD, has right AVF since Sept '23.    1/16   Nephrology on case  Continue to monitor renal functions  Continue Bumex  Metolazone added by nephro     Debility  PT OT recommending SNF    Acute hypoxic respiratory failure  Patient with Hypoxic Respiratory failure which is Acute.  he is not on home oxygen. Supplemental oxygen was provided and noted- Oxygen Concentration (%):  [60-90] 80    .   Signs/symptoms of respiratory failure include- tachypnea, increased work of breathing, respiratory distress, and use of accessory muscles. Contributing diagnoses includes - CHF Labs and images were reviewed. Patient Has not had a recent ABG. Will treat underlying causes and adjust management of respiratory failure as follows-     1/16:  Cont levaquin  Cont diuresis   Wean o2 as tolerated  Required BiPAP overnight  Currently on 36 L 90% FiO2 Vapotherm      CHF (congestive heart failure)  Patient is identified as having  unknown, TTE pending  heart failure that is Acute. CHF is currently uncontrolled due to Rales/crackles on pulmonary exam and Pulmonary edema/pleural effusion on CXR. Latest ECHO performed and demonstrates- No results found for this or any previous visit.  .  Monitor strict Is&Os and daily weights.    Place on fluid restriction of 1.5 L.   Last BNP reviewed- and noted below   Recent Labs   Lab 01/15/24  0819   *       TTE pending  Consult Cardiology    1/16:   Cardiology on case   Echo reviewed   Normal EF   Likely diastolic heart failure exacerbation  Patient still appears overloaded   Will continue Bumex 2 mg q.8h  Metolazone        Benign prostatic hyperplasia with urinary obstruction  Valera in place on arrival, placed at [facility]  CT C/A/P pending  Consider Urology consult pending CT results  Strict I/O's      Type 2 diabetes mellitus  Patient's FSGs are uncontrolled due to hyperglycemia on current medication regimen.  Last A1c reviewed-   Lab Results   Component Value Date    HGBA1C 6.2 (H) 01/10/2024     Most recent fingerstick glucose reviewed-   Recent Labs   Lab 01/15/24  1621 01/15/24  2019 01/16/24  0633   POCTGLUCOSE 184* 188* 161*       Current correctional scale  Medium  Maintain anti-hyperglycemic dose as follows-   Antihyperglycemics (From admission, onward)      Start     Stop Route Frequency Ordered    01/13/24 0900  insulin detemir U-100 (Levemir) pen 14 Units         -- SubQ Daily 01/13/24 0726    01/10/24 1728  insulin aspart U-100 pen 0-10 Units         -- SubQ Before meals & nightly PRN 01/10/24 1629          Hold Oral hypoglycemics while patient is in the hospital.      Glucose controlled  Will continue Levemir 14 units   Continue sliding scale    Anemia in chronic kidney disease (CKD)  H/h trending down  No active signs of bleeding  Cont to monitor  Continue p.o. iron supplementation  Nephrology added Venofer   He has received 1 dose EPO this stay    Essential hypertension  Chronic, controlled. Latest blood pressure and vitals reviewed-     Temp:  [98.2 °F (36.8 °C)]   Pulse:  [85-92]   Resp:  [18-22]   BP: (174-180)/(73-75)   SpO2:  [95 %-96 %] .   Home meds for hypertension were reviewed and noted below.   Hypertension Medications               amLODIPine (NORVASC) 10 MG tablet Take 10 mg by mouth once daily.    carvediloL (COREG) 25 MG tablet Take 25 mg by mouth 2 (two) times daily.    furosemide (LASIX) 80 MG tablet Take 80 mg by mouth 2 (two) times daily.    hydrALAZINE (APRESOLINE) 50 MG tablet Take 50 mg by mouth 3 (three) times daily.            While in the hospital, will manage blood pressure as  follows; Continue home antihypertensive regimen    Will utilize p.r.n. blood pressure medication only if patient's blood pressure greater than 180/110 and he develops symptoms such as worsening chest pain or shortness of breath.      VTE Risk Mitigation (From admission, onward)           Ordered     enoxaparin injection 30 mg  Every 24 hours         01/14/24 0958     IP VTE LOW RISK PATIENT  Once         01/10/24 1557     Place sequential compression device  Until discontinued         01/10/24 1557                    Discharge Planning   ALONDRA:      Code Status: DNR   Is the patient medically ready for discharge?:     Reason for patient still in hospital (select all that apply): Patient trending condition  Discharge Plan A: Home Health                  Abhijeet Kennedy MD  Department of Hospital Medicine   [facility] Tele[facility] ([facility])

## 2024-01-16 NOTE — PT/OT/SLP PROGRESS
Physical Therapy      Patient Name:  Moody Silverio   MRN:  [id number]    Patient not seen today secondary to Patient unwilling to participate. Pt supine in bed with vapotherm and states to come back later. Will follow-up at next available time.

## 2024-01-16 NOTE — PT/OT/SLP PROGRESS
Physical Therapy      Patient Name:  Moody Silverio   MRN:  [id number]    Patient not seen today secondary to Patient unwilling to participate. Second attempt made to see pt, pt reports that he is taking a nap. Will follow-up at next available time.

## 2024-01-16 NOTE — CONSULTS
SW meet with patient and daughter at bedside to discuss therapy recommendations for SNF placement. SW inquired about if patient would be interested in SNF placement. Patient stated he was not interested in SNF placement. SW stated that therapy and Attending were recommending SNF upon DC, to build strength and get back to baseline to DC home safely. Patient still declined SNF placement.  SW verbalized understanding and left a list of in-network SNF placements with daughter.   SW will update attending on Patient's decision.    SW will continue to follow and assist as needed.

## 2024-01-16 NOTE — PT/OT/SLP PROGRESS
Occupational Therapy  Treatment    Moody Silverio   MRN: [id number]   Admitting Diagnosis: Acute kidney injury superimposed on CKD    OT Date of Treatment: 01/16/24   OT Start Time: 1336  OT Stop Time: 1401  OT Total Time (min): 25 min    Billable Minutes:  Self Care/Home Management 8 and Therapeutic Activity 17    OT/ARIELLA: OT          General Precautions: Standard, fall, respiratory  Orthopedic Precautions: N/A  Braces: N/A  Respiratory Status:  VAPOTHERM         Subjective:  Communicated with NURSE prior to session.       Pain/Comfort  Pain Rating 1: 0/10  Pain Rating Post-Intervention 1: 0/10    Objective:  Patient found with: telemetry, peripheral IV, vargas catheter (VAPOTHERM)     Functional Mobility:  Bed Mobility: SUPINE > SIT WITH MIN (A); MOD (A) SIT > SUPINE WITH (A) TO BRING BLE'S INTO BED POSSIBLY DUE TO C/O FATIGUE AFTER SITTING ACTIVITY EOB.       Transfers: NT DUE TO SAFETY CONCERN        Functional Ambulation: NT DUE TO SAFETY CONCERN    Activities of Daily Living:     Feeding adaptive equipment:  NT     UE adaptive equipment: NT     LE adaptive equipment: NT                    Bathing adaptive equipment: NT    Balance:   Static Sit: FAIR-: Maintains without assist but inconsistent   Dynamic Sit: POOR (+) DUE TO POSTER LOB WHEN REACHING OVERHEAD  Static Stand: NT DUE TO SAFETY CONCERN  Dynamic stand: NT DUE TO SAFETY CONCERN    Therapeutic Activities and Exercises:    PATIENT PRACTICED BED MOBILITY, SITTING BALANCE EOB AND BUE SROM FOR INCREASED (B) SHLD FLEX SEATED EOB. PATIENT ALSO PERFORMED X10 REPS OF (B) SHLD SHRUGS AND (B) SCAP RETRACTION AS WELL AS NECK EXTEN AROM WITH FOCUS ON UPRIGHT POSTURE SEATED EOB.  PATIENT ALSO PERFORMED FACE WASHING AND ORAL HYGIENE SUPINE WITH HOB ELEVATED DUE TO C/O FATIGUE AND SOB AFTER ACTIVITY SEATED EOB WITH VAPOTHERM IN USE.  DTR PRESENT DURING THERAPY AND WAS ADVISED TO HAVE PATIENT WASH HIS FACE AND PERFORM ORAL HYGIENE EVERYDAY WHICH WILL MAKE PATIENT FEEL BETTER  "AS WELL AS PERFORMING THESE TASKS WILL ENCOURAGE MOVEMENT INSTEAD OF PATIENT JUST LYING IN BED.  PROPER BREATHING TECHNIQUE ALSO PRACTICED DUE TO PATIENT C/O SOB.             AM-PAC 6 CLICK ADL   How much help from another person does this patient currently need?   1 = Unable, Total/Dependent Assistance  2 = A lot, Maximum/Moderate Assistance  3 = A little, Minimum/Contact Guard/Supervision  4 = None, Modified [place]/Independent    Putting on and taking off regular lower body clothing? : 2  Bathing (including washing, rinsing, drying)?: 2  Toileting, which includes using toilet, bedpan, or urinal? : 2  Putting on and taking off regular upper body clothing?: 2  Taking care of personal grooming such as brushing teeth?: 3  Eating meals?: 3  Daily Activity Total Score: 14     AM-PAC Raw Score CMS "G-Code Modifier Level of Impairment Assistance   6 % Total / Unable   7 - 8 CM 80 - 100% Maximal Assist   9-13 CL 60 - 80% Moderate Assist   14 - 19 CK 40 - 60% Moderate Assist   20 - 22 CJ 20 - 40% Minimal Assist   23 CI 1-20% SBA / CGA   24 CH 0% Independent/ Mod I       Patient left HOB elevated with all lines intact, call button in reach, bed alarm on, and DTR present    ASSESSMENT:  Moody Silverio is a 53 y.o. male with a medical diagnosis of Acute kidney injury superimposed on CKD and presents with SELF CARE DEBILITY .    Rehab identified problem list/impairments:  weakness, impaired endurance, impaired self care skills, impaired functional mobility, gait instability, impaired balance, visual deficits, decreased coordination, decreased upper extremity function, decreased lower extremity function, decreased safety awareness, impaired coordination    Rehab potential is good.    Activity tolerance: Fair    Discharge recommendations: Moderate Intensity Therapy   Barriers to discharge: Barriers to Discharge: None    Equipment recommendations: to be determined by next level of care    GOALS:   Multidisciplinary " Problems       Occupational Therapy Goals          Problem: Occupational Therapy    Goal Priority Disciplines Outcome Interventions   Occupational Therapy Goal     OT, PT/OT     Description: Goals to be met by: 1/29/24     Patient will increase functional independence with ADLs by performing:    Toileting from bedside commode with Minimal Assistance for hygiene and clothing management.   Toilet transfer to bedside commode with Minimal Assistance.  Increased functional strength in B UE grossly by 1/2 MM grade.                         Plan:  Patient to be seen 2 x/week to address the above listed problems via self-care/home management, therapeutic activities, therapeutic exercises  Plan of Care expires: 01/29/24  Plan of Care reviewed with: patient, daughter         01/16/2024

## 2024-01-17 PROBLEM — N18.6 ESRD (END STAGE RENAL DISEASE): Status: ACTIVE | Noted: 2024-01-01

## 2024-01-17 NOTE — PLAN OF CARE
Pt tolerated interventions well. Required MOD A for bed mobility, MOD A of 2 for bed to chair transfer, pt with increased SOB needing rest to recover. Recommending moderate intensity therapy upon d/c.

## 2024-01-17 NOTE — ASSESSMENT & PLAN NOTE
52 y/o male with CKD stage 5 presented with volume overload:     Stage 5 chronic kidney disease due to type 2 diabetes mellitus  Cr stable, higher because of increased diuretics  Stable renal function, CKD stage 5  CKD likely due to DM and HTN     Complications of CKD:  K normal  Mild hyponatremia,  Metabolic acidosis, moderate, stable  Mild hypocalcemia, stable  PO4 not high  iPTH pending  Anemia, on epogen. Low iron sat, on venofer IV loading dose     Volume overload. Pulmonary edema:  Worse. Pt is symptomatic  Pt is not responding to the same dose of diuretics and higher doses as before  Pt will benefit from starting HD  Spoke extensively with pt and family twice today. Pt agreed to start HD  Risks and benefits explained  Opportunity for questions provided  Will send hepatitis panel for oupt HD unit placement  Will consult SW (l[facility] in [facility])      CHF (congestive heart failure)  Pulmonary edema  Less response to diuretics now  CXR looks worse     Essential hypertension  BP controlled and improved  Meds reviewed     Type 2 diabetes mellitus  Long standing  Reviewed the chart     Plans and recommendations:  As discussed above  Risks and benefits of hemodialysis initiation were explained to the pt. Benefits include correction of hyperkalemia and other electrolyte disorders, maintanance of fluid balance, and improvement in oxygenation. Risks include changes in fluid status, heart failure, and death.  Total time spent 40 minutes including time needed to review the records, the   patient evaluation, documentation, face-to-face discussion with the patient,

## 2024-01-17 NOTE — CONSULTS
01/17/24 1402   Post-Acute Status   Post-Acute Authorization Dialysis   Diaylsis Status Referrals Sent   Coverage [employer]   Discharge Delays None known at this time   Discharge Plan   Discharge Plan A Home Health     SW meet with patient and family at bedside to discuss outpatient Dialysis set up. SW inquired about what nephrologist Patient follows with. Patient's family at bedside was unsure and SW asked where patient lived to have closeness to home. Patient's family stated patient states in [place]. SW stated [facility] has clinic in [place] that patient could establish with. Patient's family member was in agreement at [facility]. SW inquired about desired schedule for dialysis set up. Patient's family stated they wished for MWF morning if possible.  RICKY called Luli, with [facility] to inquire about clinic set up. Luli provided fax number and stated once clinical information was sent over she would work on referral. RICKY sent information to Luli at fax number provided.    15 25 Per Luli, she needed patients Social Security Number and could provide Patient with schedule. SW provided Patient's social to Luli and Luli provide patient's HD schedule. Per Luli, Patient is set up with [facility] - MWF 12 pm. Per Luli, she will drop off schedule card in the morning. RICKY verbalized understanding.     RICKY will continue to follow and assist as needed.

## 2024-01-17 NOTE — NURSING
Per CJ at [facility] microbiology lab, previous sputum specimen was contaminated w/ saliva and needs to be recollected. New order placed.

## 2024-01-17 NOTE — ASSESSMENT & PLAN NOTE
Patient is identified as having  unknown, TTE pending  heart failure that is Acute. CHF is currently uncontrolled due to Rales/crackles on pulmonary exam and Pulmonary edema/pleural effusion on CXR. Latest ECHO performed and demonstrates- No results found for this or any previous visit.  .  Monitor strict Is&Os and daily weights.    Place on fluid restriction of 1.5 L.   Last BNP reviewed- and noted below   Recent Labs   Lab 01/15/24  0819   *       TTE pending  Consult Cardiology    1/17:   Cardiology on case   Echo reviewed   Normal EF   Likely diastolic heart failure exacerbation  Patient still appears overloaded   Will continue Bumex 2 mg q.8h  Metolazone   Dialysis to be initiated

## 2024-01-17 NOTE — ASSESSMENT & PLAN NOTE
54 y/o male with CKD stage 5 presented with volume overload:     Stage 5 chronic kidney disease due to type 2 diabetes mellitus  Cr stable, a little higher because of increased diuretics  Stable renal function, CKD stage 5  CKD likely due to DM and HTN     Complications of CKD:  K normal  Mild hyponatremia,  Metabolic acidosis, moderate, stable  Mild hypocalcemia, stable  PO4 not high  iPTH pending  Anemia, on epogen. Low iron sat, on venofer IV loading dose     Volume overload. Pulmonary edema:  Pt has responds well to high dose diuretics, but not to po diuretics  This may mean he needs to start HD sooner  Will discuss with family      CHF (congestive heart failure)  Reviewed the chart. See above for diuretics mgmt  Pulmonary edema  Good response to diuretics     Essential hypertension  BP controlled and improved  Meds reviewed     Type 2 diabetes mellitus  Long standing  Reviewed the chart     Plans and recommendations:  As discussed above  Total time spent 40 minutes including time needed to review the records, the   patient evaluation, documentation, face-to-face discussion with the patient,

## 2024-01-17 NOTE — PLAN OF CARE
A227/A227 ROSE MARY Silverio is a 53 y.o.male admitted on 1/10/2024 for Acute kidney injury superimposed on CKD   Code Status: DNR MRN: [id number]   Review of patient's allergies indicates:   Allergen Reactions    Ceftriaxone Itching and Swelling     Past Medical History:   Diagnosis Date    Diabetes mellitus     Hypertension     Stroke 2017/ 2018      PRN meds    0.9%  NaCl infusion (for blood administration), , Q24H PRN  dextrose 10%, 12.5 g, PRN  dextrose 10%, 12.5 g, PRN  dextrose 10%, 25 g, PRN  dextrose 10%, 25 g, PRN  glucagon (human recombinant), 1 mg, PRN  glucagon (human recombinant), 1 mg, PRN  glucose, 16 g, PRN  glucose, 16 g, PRN  glucose, 24 g, PRN  glucose, 24 g, PRN  hydrALAZINE, 10 mg, Q6H PRN  insulin aspart U-100, 0-10 Units, QID (AC + HS) PRN  naloxone, 0.02 mg, PRN  polyethylene glycol, 17 g, BID PRN  sodium chloride 0.9%, 10 mL, Q12H PRN  traZODone, 100 mg, Nightly PRN      Chart check completed. Will continue plan of care.               Lead Monitored: Lead II Rhythm: normal sinus rhythm    Cardiac/Telemetry Box Number: [id number]  VTE Required Core Measure: Pharmacological prophylaxis initiated/maintained (unable to tolerate SCDs or TEDs d/t BLE wounds) Last Bowel Movement: 01/10/24  Diet diabetic Renal; 2000 Calorie; Fluid - 1500mL; Standard Tray  Voiding Characteristics: urethral catheter (bladder)  Jurgen Score: 15  Fall Risk Score: 18  Accucheck []   Freq?      Lines/Drains/Airways       Drain  Duration                  Urethral Catheter 01/11/24 1900 16 Fr. 5 days              Peripheral Intravenous Line  Duration                  Hemodialysis AV Fistula Right forearm -- days         Peripheral IV - Single Lumen 01/11/24 1620 22 G Anterior;Left Hand 6 days                       Problem: Adult Inpatient Plan of Care  Goal: Plan of Care Review  Outcome: Ongoing, Progressing  Goal: Patient-Specific Goal (Individualized)  Outcome: Ongoing, Progressing  Goal: Absence of Hospital-Acquired Illness or  Injury  Outcome: Ongoing, Progressing  Goal: Optimal Comfort and Wellbeing  Outcome: Ongoing, Progressing  Goal: Readiness for Transition of Care  Outcome: Ongoing, Progressing     Problem: Diabetes Comorbidity  Goal: Blood Glucose Level Within Targeted Range  Outcome: Ongoing, Progressing     Problem: Fluid and Electrolyte Imbalance (Acute Kidney Injury/Impairment)  Goal: Fluid and Electrolyte Balance  Outcome: Ongoing, Progressing     Problem: Oral Intake Inadequate (Acute Kidney Injury/Impairment)  Goal: Optimal Nutrition Intake  Outcome: Ongoing, Progressing     Problem: Renal Function Impairment (Acute Kidney Injury/Impairment)  Goal: Effective Renal Function  Outcome: Ongoing, Progressing     Problem: Skin Injury Risk Increased  Goal: Skin Health and Integrity  Outcome: Ongoing, Progressing     Problem: Impaired Wound Healing  Goal: Optimal Wound Healing  Outcome: Ongoing, Progressing     Problem: Infection  Goal: Absence of Infection Signs and Symptoms  Outcome: Ongoing, Progressing     Problem: Fall Injury Risk  Goal: Absence of Fall and Fall-Related Injury  Outcome: Ongoing, Progressing     Problem: Device-Related Complication Risk (Hemodialysis)  Goal: Safe, Effective Therapy Delivery  Outcome: Ongoing, Progressing     Problem: Hemodynamic Instability (Hemodialysis)  Goal: Effective Tissue Perfusion  Outcome: Ongoing, Progressing     Problem: Infection (Hemodialysis)  Goal: Absence of Infection Signs and Symptoms  Outcome: Ongoing, Progressing

## 2024-01-17 NOTE — SUBJECTIVE & OBJECTIVE
"Interval History: Pt was seen and examined. Labs and meds reviewed. Discussed with other providers. No new c/o's. Pt continues to feel "puffy" and SOB. HD initiation today was reviewed with pt and his daughter. Pt had no questions, agreeable to starting HD.    Review of patient's allergies indicates:   Allergen Reactions    Ceftriaxone Itching and Swelling     Current Facility-Administered Medications   Medication Frequency    0.9%  NaCl infusion (for blood administration) Q24H PRN    amLODIPine tablet 10 mg Daily    atorvastatin tablet 40 mg Nightly    bumetanide injection 2 mg BID    carvediloL tablet 25 mg BID WM    clopidogreL tablet 75 mg Daily    dextrose 10% bolus 125 mL 125 mL PRN    dextrose 10% bolus 125 mL 125 mL PRN    dextrose 10% bolus 250 mL 250 mL PRN    dextrose 10% bolus 250 mL 250 mL PRN    DULoxetine DR capsule 60 mg Daily    enoxaparin injection 30 mg Q24H (prophylaxis, 1700)    fluticasone propionate 50 mcg/actuation nasal spray 100 mcg Daily    gabapentin capsule 300 mg BID    glucagon (human recombinant) injection 1 mg PRN    glucagon (human recombinant) injection 1 mg PRN    glucose chewable tablet 16 g PRN    glucose chewable tablet 16 g PRN    glucose chewable tablet 24 g PRN    glucose chewable tablet 24 g PRN    hydrALAZINE injection 10 mg Q6H PRN    hydrALAZINE tablet 100 mg TID    insulin aspart U-100 pen 0-10 Units QID (AC + HS) PRN    insulin detemir U-100 (Levemir) pen 14 Units Daily    isosorbide mononitrate 24 hr tablet 60 mg Daily    [START ON 1/19/2024] levoFLOXacin 500 mg/100 mL IVPB 500 mg Q48H    mannitol 25% injection 25 g Once    metOLazone tablet 5 mg Daily    naloxone 0.4 mg/mL injection 0.02 mg PRN    polyethylene glycol packet 17 g BID PRN    sodium bicarbonate tablet 650 mg TID    sodium chloride 0.9% flush 10 mL Q12H PRN    tamsulosin 24 hr capsule 0.4 mg Daily    traZODone tablet 100 mg Nightly PRN       Objective:     Vital Signs (Most Recent):  Temp: 98.8 °F (37.1 " °C) (01/17/24 1303)  Pulse: 81 (01/17/24 1303)  Resp: 18 (01/17/24 1303)  BP: (!) 140/65 (01/17/24 1303)  SpO2: (!) 94 % (01/17/24 1303) Vital Signs (24h Range):  Temp:  [98.4 °F (36.9 °C)-98.9 °F (37.2 °C)] 98.8 °F (37.1 °C)  Pulse:  [80-88] 81  Resp:  [18-20] 18  SpO2:  [92 %-99 %] 94 %  BP: (123-165)/(58-74) 140/65     Weight: 127.6 kg (281 lb 4.9 oz) (01/17/24 0442)  Body mass index is 37.11 kg/m².  Body surface area is 2.56 meters squared.    I/O last 3 completed shifts:  In: -   Out: 650 [Urine:650]     Physical Exam  Vitals and nursing note reviewed.   Constitutional:       Appearance: Normal appearance.   Cardiovascular:      Rate and Rhythm: Normal rate and regular rhythm.      Pulses: Normal pulses.      Heart sounds: Normal heart sounds.   Pulmonary:      Effort: Pulmonary effort is normal.      Breath sounds: Rales present.   Abdominal:      Palpations: Abdomen is soft.      Tenderness: There is no abdominal tenderness.   Musculoskeletal:      Right lower leg: Edema present.      Left lower leg: Edema present.   Neurological:      Mental Status: He is alert and oriented to person, place, and time.   Psychiatric:         Behavior: Behavior normal.          Significant Labs: reviewed  BMP  Lab Results   Component Value Date     (L) 01/17/2024    K 4.1 01/17/2024    CL 96 01/17/2024    CO2 15 (L) 01/17/2024     (H) 01/17/2024    CREATININE 8.6 (H) 01/17/2024    CALCIUM 6.7 (LL) 01/17/2024    ANIONGAP 21 (H) 01/17/2024    EGFRNORACEVR 7 (A) 01/17/2024     Lab Results   Component Value Date    WBC 14.59 (H) 01/17/2024    HGB 7.3 (L) 01/17/2024    HCT 22.0 (L) 01/17/2024    MCV 87 01/17/2024     01/17/2024         Significant Imaging: CXR: pulm edema

## 2024-01-17 NOTE — SUBJECTIVE & OBJECTIVE
Interval History:  No issues reported overnight.  Plans in place to start dialysis today per Nephrology.    Review of Systems   Constitutional:  Negative for fatigue and fever.   HENT:  Negative for sinus pressure.    Eyes:  Positive for visual disturbance.   Respiratory:  Positive for shortness of breath.    Cardiovascular:  Positive for leg swelling. Negative for chest pain.   Gastrointestinal:  Negative for nausea and vomiting.   Genitourinary:  Negative for difficulty urinating.   Musculoskeletal:  Negative for back pain.   Skin:  Negative for rash.   Neurological:  Negative for headaches.   Psychiatric/Behavioral:  Negative for confusion.      Objective:     Vital Signs (Most Recent):  Temp: 98.8 °F (37.1 °C) (01/17/24 0724)  Pulse: 81 (01/17/24 0724)  Resp: 18 (01/17/24 0724)  BP: 129/60 (01/17/24 0825)  SpO2: (!) 94 % (01/17/24 0724) Vital Signs (24h Range):  Temp:  [98.4 °F (36.9 °C)-98.9 °F (37.2 °C)] 98.8 °F (37.1 °C)  Pulse:  [81-88] 81  Resp:  [18-20] 18  SpO2:  [92 %-99 %] 94 %  BP: (123-165)/(58-74) 129/60     Weight: 127.6 kg (281 lb 4.9 oz)  Body mass index is 37.11 kg/m².    Intake/Output Summary (Last 24 hours) at 1/17/2024 1014  Last data filed at 1/17/2024 0627  Gross per 24 hour   Intake --   Output 250 ml   Net -250 ml         Physical Exam  Constitutional:       General: He is not in acute distress.     Appearance: He is well-developed. He is obese. He is not ill-appearing or diaphoretic.   HENT:      Head: Normocephalic and atraumatic.   Eyes:      Pupils: Pupils are equal, round, and reactive to light.   Cardiovascular:      Rate and Rhythm: Normal rate and regular rhythm.      Heart sounds: Normal heart sounds. No murmur heard.     No friction rub. No gallop.   Pulmonary:      Effort: Pulmonary effort is normal. No respiratory distress.      Breath sounds: No stridor. Rales present. No wheezing.   Abdominal:      General: Bowel sounds are normal. There is no distension.      Palpations:  Abdomen is soft. There is no mass.      Tenderness: There is no abdominal tenderness. There is no guarding.   Musculoskeletal:      Right lower leg: Edema present.      Left lower leg: Edema present.   Skin:     General: Skin is warm.      Findings: No erythema.   Neurological:      Mental Status: He is alert and oriented to person, place, and time.             Significant Labs: All pertinent labs within the past 24 hours have been reviewed.    Significant Imaging: I have reviewed all pertinent imaging results/findings within the past 24 hours.

## 2024-01-17 NOTE — PROGRESS NOTES
Pharmacist Renal Dose Adjustment Note    Moody Silverio is a 53 y.o. male being treated with the medication levaquin    Patient Data:    Vital Signs (Most Recent):  Temp: 98.8 °F (37.1 °C) (01/17/24 0724)  Pulse: 81 (01/17/24 0724)  Resp: 18 (01/17/24 0724)  BP: 129/60 (01/17/24 0825)  SpO2: (!) 94 % (01/17/24 0724) Vital Signs (72h Range):  Temp:  [97.9 °F (36.6 °C)-100.8 °F (38.2 °C)]   Pulse:  [75-88]   Resp:  [17-22]   BP: (123-165)/(58-74)   SpO2:  [84 %-99 %]      Recent Labs   Lab 01/15/24  0540 01/16/24  0419 01/17/24  0452   CREATININE 5.6* 6.7* 8.6*     Serum creatinine: 8.6 mg/dL (H) 01/17/24 0452  Estimated creatinine clearance: 13.6 mL/min (A)    Medication: levaquin 750mg every 48 hours will be changed to levaquin 500mg every 48 hours for HD dosing    Pharmacist's Name: Roxane Bolanos  Pharmacist's Extension: [phone]

## 2024-01-17 NOTE — ASSESSMENT & PLAN NOTE
Patient with acute kidney injury/acute renal failure likely due to post-obstructive d/t bladder outlet obstruction  ELMIRA is currently worsening. Baseline creatinine unknown - Labs reviewed- Renal function/electrolytes with Estimated Creatinine Clearance: 13.6 mL/min (A) (based on SCr of 8.6 mg/dL (H)). according to latest data. Monitor urine output and serial BMP and adjust therapy as needed. Avoid nephrotoxins and renally dose meds for GFR listed above.    Hold further IV diuretics for now  Consult Nephrology  Strict I/O's, avoid nephrotoxins  May require HD, has right AVF since Sept '23.    1/17   Nephrology on case  Continue to monitor renal functions  Continue Bumex  Metolazone added by nephro   Creatinine trending up   Nephrology plans to initiate dialysis

## 2024-01-17 NOTE — PROGRESS NOTES
"[facility] ([facility])  Nephrology  Progress Note    Patient Name: Moody Silverio  MRN: [id number]  Admission Date: 1/10/2024  Hospital Length of Stay: 7 days  Attending Provider: Abhijeet Kennedy MD   Primary Care Physician: Jersey Nolan MD  Principal Problem:Acute kidney injury superimposed on CKD    Subjective:     HPI: noted    Interval History: Pt was seen and examined. Labs and meds reviewed. Discussed with other providers. No new c/o's. Pt continues to feel "puffy" and SOB. HD initiation today was reviewed with pt and his daughter. Pt had no questions, agreeable to starting HD.    Review of patient's allergies indicates:   Allergen Reactions    Ceftriaxone Itching and Swelling     Current Facility-Administered Medications   Medication Frequency    0.9%  NaCl infusion (for blood administration) Q24H PRN    amLODIPine tablet 10 mg Daily    atorvastatin tablet 40 mg Nightly    bumetanide injection 2 mg BID    carvediloL tablet 25 mg BID WM    clopidogreL tablet 75 mg Daily    dextrose 10% bolus 125 mL 125 mL PRN    dextrose 10% bolus 125 mL 125 mL PRN    dextrose 10% bolus 250 mL 250 mL PRN    dextrose 10% bolus 250 mL 250 mL PRN    DULoxetine DR capsule 60 mg Daily    enoxaparin injection 30 mg Q24H (prophylaxis, 1700)    fluticasone propionate 50 mcg/actuation nasal spray 100 mcg Daily    gabapentin capsule 300 mg BID    glucagon (human recombinant) injection 1 mg PRN    glucagon (human recombinant) injection 1 mg PRN    glucose chewable tablet 16 g PRN    glucose chewable tablet 16 g PRN    glucose chewable tablet 24 g PRN    glucose chewable tablet 24 g PRN    hydrALAZINE injection 10 mg Q6H PRN    hydrALAZINE tablet 100 mg TID    insulin aspart U-100 pen 0-10 Units QID (AC + HS) PRN    insulin detemir U-100 (Levemir) pen 14 Units Daily    isosorbide mononitrate 24 hr tablet 60 mg Daily    [START ON 1/19/2024] levoFLOXacin 500 mg/100 mL IVPB 500 mg Q48H    mannitol 25% injection 25 g Once    metOLazone " tablet 5 mg Daily    naloxone 0.4 mg/mL injection 0.02 mg PRN    polyethylene glycol packet 17 g BID PRN    sodium bicarbonate tablet 650 mg TID    sodium chloride 0.9% flush 10 mL Q12H PRN    tamsulosin 24 hr capsule 0.4 mg Daily    traZODone tablet 100 mg Nightly PRN       Objective:     Vital Signs (Most Recent):  Temp: 98.8 °F (37.1 °C) (01/17/24 1303)  Pulse: 81 (01/17/24 1303)  Resp: 18 (01/17/24 1303)  BP: (!) 140/65 (01/17/24 1303)  SpO2: (!) 94 % (01/17/24 1303) Vital Signs (24h Range):  Temp:  [98.4 °F (36.9 °C)-98.9 °F (37.2 °C)] 98.8 °F (37.1 °C)  Pulse:  [80-88] 81  Resp:  [18-20] 18  SpO2:  [92 %-99 %] 94 %  BP: (123-165)/(58-74) 140/65     Weight: 127.6 kg (281 lb 4.9 oz) (01/17/24 0442)  Body mass index is 37.11 kg/m².  Body surface area is 2.56 meters squared.    I/O last 3 completed shifts:  In: -   Out: 650 [Urine:650]     Physical Exam  Vitals and nursing note reviewed.   Constitutional:       Appearance: Normal appearance.   Cardiovascular:      Rate and Rhythm: Normal rate and regular rhythm.      Pulses: Normal pulses.      Heart sounds: Normal heart sounds.   Pulmonary:      Effort: Pulmonary effort is normal.      Breath sounds: Rales present.   Abdominal:      Palpations: Abdomen is soft.      Tenderness: There is no abdominal tenderness.   Musculoskeletal:      Right lower leg: Edema present.      Left lower leg: Edema present.   Neurological:      Mental Status: He is alert and oriented to person, place, and time.   Psychiatric:         Behavior: Behavior normal.          Significant Labs: reviewed  BMP  Lab Results   Component Value Date     (L) 01/17/2024    K 4.1 01/17/2024    CL 96 01/17/2024    CO2 15 (L) 01/17/2024     (H) 01/17/2024    CREATININE 8.6 (H) 01/17/2024    CALCIUM 6.7 (LL) 01/17/2024    ANIONGAP 21 (H) 01/17/2024    EGFRNORACEVR 7 (A) 01/17/2024     Lab Results   Component Value Date    WBC 14.59 (H) 01/17/2024    HGB 7.3 (L) 01/17/2024    HCT 22.0 (L)  01/17/2024    MCV 87 01/17/2024     01/17/2024         Significant Imaging: CXR: pulm edema  Assessment/Plan:     54 y/o male with CKD stage 5 presented with volume overload:     Stage 5 chronic kidney disease due to type 2 diabetes mellitus  Stable renal function, CKD stage 5  CKD likely due to DM and HTN  Poor response to diuretics (PO and IV)  Without IV diuretics, pt is very SOB; even with IV, SOB has persisted  Pt agreed to start chronic HD    Complications of CKD:  K normal  Mild hyponatremia, will correct with HD  Metabolic acidosis, moderate, will improve with HD  Mild hypocalcemia, stable  PO4 not high  iPTH pending  Anemia, on epogen. Low iron sat, on venofer IV loading dose     Volume overload. Pulmonary edema:  Worse. Pt is symptomatic  Spoke extensively with pt and family twice today. Reviewed today    Hepatitis panel for oupt HD unit placement pending  Consulted SW (lives in [facility])  Will get mannitol to lower risk of DDS  HD#1 today  HD#2 tomorrow      CHF (congestive heart failure)  Pulmonary edema  Less response to diuretics now  CXR looks worse     Essential hypertension  BP controlled and improved  Meds reviewed     Type 2 diabetes mellitus  Long standing  Reviewed the chart     Plans and recommendations:  As discussed above  Risks and benefits of hemodialysis initiation were explained to the pt. Benefits include correction of hyperkalemia and other electrolyte disorders, maintanance of fluid balance, and improvement in oxygenation. Risks include changes in fluid status, heart failure, and death.  Total time spent 40 minutes including time needed to review the records, the   patient evaluation, documentation, face-to-face discussion with the patient,         Laya Bartlett MD  Nephrology  [facility] Tele[facility] ([facility])

## 2024-01-17 NOTE — PROGRESS NOTES
[facility] ([facility])  Nephrology  Progress Note    Patient Name: Moody Silverio  MRN: [id number]  Admission Date: 1/10/2024  Hospital Length of Stay: 6 days  Attending Provider: Abhijeet Kennedy MD   Primary Care Physician: Jersey Nolan MD  Principal Problem:Acute kidney injury superimposed on CKD    Subjective:     HPI: Noted    Interval History: Pt was seen and examined. Labs and meds reviewed. Discussed with other providers. No new issues, pt continues to have SOB and fluid overload. Pt does not feel comfortable.      Review of patient's allergies indicates:   Allergen Reactions    Ceftriaxone Itching and Swelling     Current Facility-Administered Medications   Medication Frequency    0.9%  NaCl infusion (for blood administration) Q24H PRN    amLODIPine tablet 10 mg Daily    atorvastatin tablet 40 mg Nightly    bumetanide injection 2 mg BID    carvediloL tablet 25 mg BID WM    clopidogreL tablet 75 mg Daily    dextrose 10% bolus 125 mL 125 mL PRN    dextrose 10% bolus 125 mL 125 mL PRN    dextrose 10% bolus 250 mL 250 mL PRN    dextrose 10% bolus 250 mL 250 mL PRN    DULoxetine DR capsule 60 mg Daily    enoxaparin injection 30 mg Q24H (prophylaxis, 1700)    fluticasone propionate 50 mcg/actuation nasal spray 100 mcg Daily    gabapentin capsule 300 mg BID    glucagon (human recombinant) injection 1 mg PRN    glucagon (human recombinant) injection 1 mg PRN    glucose chewable tablet 16 g PRN    glucose chewable tablet 16 g PRN    glucose chewable tablet 24 g PRN    glucose chewable tablet 24 g PRN    hydrALAZINE injection 10 mg Q6H PRN    hydrALAZINE tablet 100 mg TID    insulin aspart U-100 pen 0-10 Units QID (AC + HS) PRN    insulin detemir U-100 (Levemir) pen 14 Units Daily    iron sucrose injection 200 mg Daily    isosorbide mononitrate 24 hr tablet 60 mg Daily    levoFLOXacin 750 mg/150 mL IVPB 750 mg Q48H    metOLazone tablet 5 mg Daily    naloxone 0.4 mg/mL injection 0.02 mg PRN    polyethylene glycol  packet 17 g BID PRN    sodium bicarbonate tablet 650 mg TID    sodium chloride 0.9% flush 10 mL Q12H PRN    tamsulosin 24 hr capsule 0.4 mg Daily    traZODone tablet 100 mg Nightly PRN       Objective:     Vital Signs (Most Recent):  Temp: 98.9 °F (37.2 °C) (01/16/24 1739)  Pulse: 87 (01/16/24 1739)  Resp: 19 (01/16/24 1739)  BP: (!) 165/74 (01/16/24 1739)  SpO2: (!) 93 % (01/16/24 1739) Vital Signs (24h Range):  Temp:  [98.2 °F (36.8 °C)-98.9 °F (37.2 °C)] 98.9 °F (37.2 °C)  Pulse:  [81-87] 87  Resp:  [19-22] 19  SpO2:  [92 %-99 %] 93 %  BP: (133-165)/(62-74) 165/74     Weight: 127.4 kg (280 lb 13.9 oz) (01/16/24 0458)  Body mass index is 37.06 kg/m².  Body surface area is 2.56 meters squared.    I/O last 3 completed shifts:  In: 296.1 [IV Piggyback:296.1]  Out: 400 [Urine:400]     Physical Exam  Vitals and nursing note reviewed.   Constitutional:       Appearance: Normal appearance.   Cardiovascular:      Rate and Rhythm: Normal rate and regular rhythm.      Pulses: Normal pulses.      Heart sounds: Normal heart sounds.   Pulmonary:      Effort: Respiratory distress present.      Breath sounds: Rales present.   Abdominal:      Palpations: Abdomen is soft.      Tenderness: There is no abdominal tenderness.   Musculoskeletal:      Right lower leg: Edema present.      Left lower leg: Edema present.   Neurological:      Mental Status: He is alert and oriented to person, place, and time.   Psychiatric:         Behavior: Behavior normal.          Significant Labs: reviewed  BMP  Lab Results   Component Value Date     (L) 01/16/2024    K 4.1 01/16/2024    CL 98 01/16/2024    CO2 18 (L) 01/16/2024     (H) 01/16/2024    CREATININE 6.7 (H) 01/16/2024    CALCIUM 7.1 (L) 01/16/2024    ANIONGAP 16 01/16/2024    EGFRNORACEVR 9 (A) 01/16/2024     Lab Results   Component Value Date    WBC 12.54 01/16/2024    HGB 7.5 (L) 01/16/2024    HCT 23.3 (L) 01/16/2024    MCV 88 01/16/2024     01/16/2024     Lab Results    Component Value Date    IRON 13 (L) 01/12/2024    TRANSFERRIN 123 (L) 01/12/2024    TIBC 182 (L) 01/12/2024    FESATURATED 7 (L) 01/12/2024        Significant Imaging: CXR reviewed, looks slightly worse with pulmonary edema  Assessment/Plan:     54 y/o male with CKD stage 5 presented with volume overload:     Stage 5 chronic kidney disease due to type 2 diabetes mellitus  Cr stable, higher because of increased diuretics  Stable renal function, CKD stage 5  CKD likely due to DM and HTN     Complications of CKD:  K normal  Mild hyponatremia,  Metabolic acidosis, moderate, stable  Mild hypocalcemia, stable  PO4 not high  iPTH pending  Anemia, on epogen. Low iron sat, on venofer IV loading dose     Volume overload. Pulmonary edema:  Worse. Pt is symptomatic  Pt is not responding to the same dose of diuretics and higher doses as before  Pt will benefit from starting HD  Spoke extensively with pt and family twice today. Pt agreed to start HD  Risks and benefits explained  Opportunity for questions provided  Will send hepatitis panel for oupt HD unit placement  Will consult SW (lives in [facility])      CHF (congestive heart failure)  Pulmonary edema  Less response to diuretics now  CXR looks worse     Essential hypertension  BP controlled and improved  Meds reviewed     Type 2 diabetes mellitus  Long standing  Reviewed the chart     Plans and recommendations:  As discussed above  Risks and benefits of hemodialysis initiation were explained to the pt. Benefits include correction of hyperkalemia and other electrolyte disorders, maintanance of fluid balance, and improvement in oxygenation. Risks include changes in fluid status, heart failure, and death.  Total time spent 40 minutes including time needed to review the records, the   patient evaluation, documentation, face-to-face discussion with the patient,         Laya Bartlett MD  Nephrology  [facility] ([facility])

## 2024-01-17 NOTE — ASSESSMENT & PLAN NOTE
PT OT recommending SNF  However patient declining placement  Will plan for home health once ready for discharge

## 2024-01-17 NOTE — PROGRESS NOTES
"[facility]  [facility]    Patient Name: Moody Silverio  MRN: [id number]  Patient Class: IP- Inpatient   Admission Date: 1/10/2024  Length of Stay: 7 days  Attending Physician: Abhijeet Kennedy MD  Primary Care Provider: Jersey Nolan MD        Subjective:     Principal Problem:Acute kidney injury superimposed on CKD        HPI:  Moody Silverio is 52 y/o male with PMHx IDDM, HTN, CKD, Hx of CVA with right hemiparesis, anemia of chronic disease, BPH, chronic back pain, anxiety who presented to [facility] on 1-8-24 with c/o SOB due to volume overload/CHF exacerbation and pneumonia and also acute on CKD. History obtained from records sent from outside hospital, patient, and family at bedside. Patient remained at outside hospital ER till transfer to [facility] today (1-10-24). Patient arrived to [facility] with Valera in place and on supplemental O2. Patient with right forearm AV shunt. Patient reports continued SOB, constipation, abdominal distention, fatigue.    Outside hospital ER labs/imaging reviewed:  WBC 11  BUN/Cr 77/4.8 (1/8/24); Cr 4.55    H/H 8.6/27.4  CXR with bilateral upper and lower lobe patchy hazy pulmonary opacities either edema or pneumonia (1/9/24)  CT Chest "bilateral pneumonia with an underlying component of volume overload." (1/8/24)    ER course reviewed: patient was initially placed on BiPAP, transitioned to NC (2-3 L), and also required Valera cathter placement due to urinary outlet obstruction. He was treated with pneumonia and CHF exacerbation, received ceftriaxone, azithromycin, doxycycline, DuoNebs, Bumex IV, Lasix IV. Also noted he developed reaction to ceftriaxone, listed as allergy[facility] as [facility] to [facility] on 1[facility]8-[facility].    Overview/Hospital Course:  1/11:  Nephrology and cardiology on case.  Recommend Bumex 2 mg q.8h.  Will continue p.o. doxycycline.  " Wean O2 as tolerated.  Echo showed normal EF.  Likely diastolic heart failure exacerbation.  1/12:  Patient requiring high-flow Vapotherm today, will repeat stat chest x-ray.  Continue with diuresis.  Anemia noted, likely to CKD.  Will check iron studies.  Consider EPO and iron replacement.  1/13:  Patient currently on 30 L 60% FiO2.  Continue with diuresis.  Start IV Levaquin for pneumonia.  Anemia again noted, will transfuse 1 unit packed RBCs.  Likely secondary to CKD and iron deficiency.  Start iron replacement therapy.  EPO given.  1/14:  Patient on 30 L 85% FiO2.  Continue diuresis.  Continue Levaquin.  H&H improved status post 1 unit packed RBC.  Continue iron replacement therapy.  1/15: weaned down to 30L 60% fio2. Cont levaquin for pna. H/h trending down, no active signs of bleeding noted. Cont to monitor. Cont on iv bumex and metolazone for diuresis. Pt/OT eval.   1/16:.  Patient required BiPAP overnight however only wore it for a few hours.  Currently on 36 L 90% Vapotherm.  Pulmonology on case.  Continue diuresis.  Nephrology on case.  Repeat chest x-ray today.  Continue Levaquin.  PT/OT recommending SNF placement.  1/17:.  Patient continues to be volume overloaded.  Creatinine trending up.  Nephrology to start dialysis.  Patient with right AV fistula in forearm.  Continue empiric treatment for pneumonia.    Interval History:  No issues reported overnight.  Plans in place to start dialysis today per Nephrology.    Review of Systems   Constitutional:  Negative for fatigue and fever.   HENT:  Negative for sinus pressure.    Eyes:  Positive for visual disturbance.   Respiratory:  Positive for shortness of breath.    Cardiovascular:  Positive for leg swelling. Negative for chest pain.   Gastrointestinal:  Negative for nausea and vomiting.   Genitourinary:  Negative for difficulty urinating.   Musculoskeletal:  Negative for back pain.   Skin:  Negative for rash.   Neurological:  Negative for headaches.    Psychiatric/Behavioral:  Negative for confusion.      Objective:     Vital Signs (Most Recent):  Temp: 98.8 °F (37.1 °C) (01/17/24 0724)  Pulse: 81 (01/17/24 0724)  Resp: 18 (01/17/24 0724)  BP: 129/60 (01/17/24 0825)  SpO2: (!) 94 % (01/17/24 0724) Vital Signs (24h Range):  Temp:  [98.4 °F (36.9 °C)-98.9 °F (37.2 °C)] 98.8 °F (37.1 °C)  Pulse:  [81-88] 81  Resp:  [18-20] 18  SpO2:  [92 %-99 %] 94 %  BP: (123-165)/(58-74) 129/60     Weight: 127.6 kg (281 lb 4.9 oz)  Body mass index is 37.11 kg/m².    Intake/Output Summary (Last 24 hours) at 1/17/2024 1014  Last data filed at 1/17/2024 0627  Gross per 24 hour   Intake --   Output 250 ml   Net -250 ml         Physical Exam  Constitutional:       General: He is not in acute distress.     Appearance: He is well-developed. He is obese. He is not ill-appearing or diaphoretic.   HENT:      Head: Normocephalic and atraumatic.   Eyes:      Pupils: Pupils are equal, round, and reactive to light.   Cardiovascular:      Rate and Rhythm: Normal rate and regular rhythm.      Heart sounds: Normal heart sounds. No murmur heard.     No friction rub. No gallop.   Pulmonary:      Effort: Pulmonary effort is normal. No respiratory distress.      Breath sounds: No stridor. Rales present. No wheezing.   Abdominal:      General: Bowel sounds are normal. There is no distension.      Palpations: Abdomen is soft. There is no mass.      Tenderness: There is no abdominal tenderness. There is no guarding.   Musculoskeletal:      Right lower leg: Edema present.      Left lower leg: Edema present.   Skin:     General: Skin is warm.      Findings: No erythema.   Neurological:      Mental Status: He is alert and oriented to person, place, and time.             Significant Labs: All pertinent labs within the past 24 hours have been reviewed.    Significant Imaging: I have reviewed all pertinent imaging results/findings within the past 24 hours.    Assessment/Plan:      * Acute kidney injury  superimposed on CKD  Patient with acute kidney injury/acute renal failure likely due to post-obstructive d/t bladder outlet obstruction  ELMIRA is currently worsening. Baseline creatinine unknown - Labs reviewed- Renal function/electrolytes with Estimated Creatinine Clearance: 13.6 mL/min (A) (based on SCr of 8.6 mg/dL (H)). according to latest data. Monitor urine output and serial BMP and adjust therapy as needed. Avoid nephrotoxins and renally dose meds for GFR listed above.    Hold further IV diuretics for now  Consult Nephrology  Strict I/O's, avoid nephrotoxins  May require HD, has right AVF since Sept '23.    1/17   Nephrology on case  Continue to monitor renal functions  Continue Bumex  Metolazone added by nephro   Creatinine trending up   Nephrology plans to initiate dialysis    Debility  PT OT recommending SNF  However patient declining placement  Will plan for home health once ready for discharge    Acute hypoxic respiratory failure  Patient with Hypoxic Respiratory failure which is Acute.  he is not on home oxygen. Supplemental oxygen was provided and noted- Oxygen Concentration (%):  [60-90] 80    .   Signs/symptoms of respiratory failure include- tachypnea, increased work of breathing, respiratory distress, and use of accessory muscles. Contributing diagnoses includes - CHF Labs and images were reviewed. Patient Has not had a recent ABG. Will treat underlying causes and adjust management of respiratory failure as follows-     1/16:  Cont levaquin  Cont diuresis   Wean o2 as tolerated  Required BiPAP overnight  Currently on 36 L 90% FiO2 Vapotherm      CHF (congestive heart failure)  Patient is identified as having  unknown, TTE pending  heart failure that is Acute. CHF is currently uncontrolled due to Rales/crackles on pulmonary exam and Pulmonary edema/pleural effusion on CXR. Latest ECHO performed and demonstrates- No results found for this or any previous visit.  .  Monitor strict Is&Os and daily  weights.    Place on fluid restriction of 1.5 L.   Last BNP reviewed- and noted below   Recent Labs   Lab 01/15/24  0819   *       TTE pending  Consult Cardiology    1/17:   Cardiology on case   Echo reviewed   Normal EF   Likely diastolic heart failure exacerbation  Patient still appears overloaded   Will continue Bumex 2 mg q.8h  Metolazone   Dialysis to be initiated    Benign prostatic hyperplasia with urinary obstruction  Valera in place on arrival, placed at [facility]  CT C/A/P pending  Consider Urology consult pending CT results  Strict I/O's      Type 2 diabetes mellitus  Patient's FSGs are uncontrolled due to hyperglycemia on current medication regimen.  Last A1c reviewed-   Lab Results   Component Value Date    HGBA1C 6.2 (H) 01/10/2024     Most recent fingerstick glucose reviewed-   Recent Labs   Lab 01/15/24  1621 01/15/24  2019 01/16/24  0633   POCTGLUCOSE 184* 188* 161*       Current correctional scale  Medium  Maintain anti-hyperglycemic dose as follows-   Antihyperglycemics (From admission, onward)      Start     Stop Route Frequency Ordered    01/13/24 0900  insulin detemir U-100 (Levemir) pen 14 Units         -- SubQ Daily 01/13/24 0726    01/10/24 1728  insulin aspart U-100 pen 0-10 Units         -- SubQ Before meals & nightly PRN 01/10/24 1629          Hold Oral hypoglycemics while patient is in the hospital.      Glucose controlled  Will continue Levemir 14 units   Continue sliding scale    Anemia in chronic kidney disease (CKD)  H/h trending down  No active signs of bleeding  Cont to monitor  Continue p.o. iron supplementation  Nephrology added Venofer   He has received 1 dose EPO this stay    Essential hypertension  Chronic, controlled. Latest blood pressure and vitals reviewed-     Temp:  [98.2 °F (36.8 °C)]   Pulse:  [85-92]   Resp:  [18-22]   BP: (174-180)/(73-75)   SpO2:  [95 %-96 %] .   Home meds for hypertension were reviewed and noted below.   Hypertension Medications                amLODIPine (NORVASC) 10 MG tablet Take 10 mg by mouth once daily.    carvediloL (COREG) 25 MG tablet Take 25 mg by mouth 2 (two) times daily.    furosemide (LASIX) 80 MG tablet Take 80 mg by mouth 2 (two) times daily.    hydrALAZINE (APRESOLINE) 50 MG tablet Take 50 mg by mouth 3 (three) times daily.            While in the hospital, will manage blood pressure as follows; Continue home antihypertensive regimen    Will utilize p.r.n. blood pressure medication only if patient's blood pressure greater than 180/110 and he develops symptoms such as worsening chest pain or shortness of breath.      VTE Risk Mitigation (From admission, onward)           Ordered     enoxaparin injection 30 mg  Every 24 hours         01/14/24 0958     IP VTE LOW RISK PATIENT  Once         01/10/24 1557     Place sequential compression device  Until discontinued         01/10/24 1557                    Discharge Planning   ALONDRA:      Code Status: DNR   Is the patient medically ready for discharge?:     Reason for patient still in hospital (select all that apply): Patient trending condition  Discharge Plan A: Home Health                  Abhijeet Kennedy MD  Department of Hospital Medicine   [facility] ([facility])

## 2024-01-17 NOTE — SUBJECTIVE & OBJECTIVE
Interval History: Pt was seen and examined. Labs and meds reviewed. Discussed with other providers. No new issues, pt continues to have SOB and fluid overload. Pt does not feel comfortable.      Review of patient's allergies indicates:   Allergen Reactions    Ceftriaxone Itching and Swelling     Current Facility-Administered Medications   Medication Frequency    0.9%  NaCl infusion (for blood administration) Q24H PRN    amLODIPine tablet 10 mg Daily    atorvastatin tablet 40 mg Nightly    bumetanide injection 2 mg BID    carvediloL tablet 25 mg BID WM    clopidogreL tablet 75 mg Daily    dextrose 10% bolus 125 mL 125 mL PRN    dextrose 10% bolus 125 mL 125 mL PRN    dextrose 10% bolus 250 mL 250 mL PRN    dextrose 10% bolus 250 mL 250 mL PRN    DULoxetine DR capsule 60 mg Daily    enoxaparin injection 30 mg Q24H (prophylaxis, 1700)    fluticasone propionate 50 mcg/actuation nasal spray 100 mcg Daily    gabapentin capsule 300 mg BID    glucagon (human recombinant) injection 1 mg PRN    glucagon (human recombinant) injection 1 mg PRN    glucose chewable tablet 16 g PRN    glucose chewable tablet 16 g PRN    glucose chewable tablet 24 g PRN    glucose chewable tablet 24 g PRN    hydrALAZINE injection 10 mg Q6H PRN    hydrALAZINE tablet 100 mg TID    insulin aspart U-100 pen 0-10 Units QID (AC + HS) PRN    insulin detemir U-100 (Levemir) pen 14 Units Daily    iron sucrose injection 200 mg Daily    isosorbide mononitrate 24 hr tablet 60 mg Daily    levoFLOXacin 750 mg/150 mL IVPB 750 mg Q48H    metOLazone tablet 5 mg Daily    naloxone 0.4 mg/mL injection 0.02 mg PRN    polyethylene glycol packet 17 g BID PRN    sodium bicarbonate tablet 650 mg TID    sodium chloride 0.9% flush 10 mL Q12H PRN    tamsulosin 24 hr capsule 0.4 mg Daily    traZODone tablet 100 mg Nightly PRN       Objective:     Vital Signs (Most Recent):  Temp: 98.9 °F (37.2 °C) (01/16/24 1739)  Pulse: 87 (01/16/24 1739)  Resp: 19 (01/16/24 1739)  BP: (!)  165/74 (01/16/24 1739)  SpO2: (!) 93 % (01/16/24 1739) Vital Signs (24h Range):  Temp:  [98.2 °F (36.8 °C)-98.9 °F (37.2 °C)] 98.9 °F (37.2 °C)  Pulse:  [81-87] 87  Resp:  [19-22] 19  SpO2:  [92 %-99 %] 93 %  BP: (133-165)/(62-74) 165/74     Weight: 127.4 kg (280 lb 13.9 oz) (01/16/24 0458)  Body mass index is 37.06 kg/m².  Body surface area is 2.56 meters squared.    I/O last 3 completed shifts:  In: 296.1 [IV Piggyback:296.1]  Out: 400 [Urine:400]     Physical Exam  Vitals and nursing note reviewed.   Constitutional:       Appearance: Normal appearance.   Cardiovascular:      Rate and Rhythm: Normal rate and regular rhythm.      Pulses: Normal pulses.      Heart sounds: Normal heart sounds.   Pulmonary:      Effort: Respiratory distress present.      Breath sounds: Rales present.   Abdominal:      Palpations: Abdomen is soft.      Tenderness: There is no abdominal tenderness.   Musculoskeletal:      Right lower leg: Edema present.      Left lower leg: Edema present.   Neurological:      Mental Status: He is alert and oriented to person, place, and time.   Psychiatric:         Behavior: Behavior normal.          Significant Labs: reviewed  BMP  Lab Results   Component Value Date     (L) 01/16/2024    K 4.1 01/16/2024    CL 98 01/16/2024    CO2 18 (L) 01/16/2024     (H) 01/16/2024    CREATININE 6.7 (H) 01/16/2024    CALCIUM 7.1 (L) 01/16/2024    ANIONGAP 16 01/16/2024    EGFRNORACEVR 9 (A) 01/16/2024     Lab Results   Component Value Date    WBC 12.54 01/16/2024    HGB 7.5 (L) 01/16/2024    HCT 23.3 (L) 01/16/2024    MCV 88 01/16/2024     01/16/2024     Lab Results   Component Value Date    IRON 13 (L) 01/12/2024    TRANSFERRIN 123 (L) 01/12/2024    TIBC 182 (L) 01/12/2024    FESATURATED 7 (L) 01/12/2024        Significant Imaging: CXR reviewed, looks slightly worse with pulmonary edema

## 2024-01-17 NOTE — PT/OT/SLP PROGRESS
Physical Therapy Treatment    Patient Name:  Moody Silverio   MRN:  [id number]    Recommendations:     Discharge Recommendations: Moderate Intensity Therapy  Discharge Equipment Recommendations: to be determined by next level of care  Barriers to discharge: Decreased caregiver support    Assessment:     Moody Silverio is a 53 y.o. male admitted with a medical diagnosis of Acute kidney injury superimposed on CKD.  He presents with the following impairments/functional limitations: impaired endurance, weakness, impaired functional mobility, gait instability, impaired balance, pain, decreased safety awareness, decreased lower extremity function, decreased coordination, decreased ROM, impaired cardiopulmonary response to activity, impaired skin.    Rehab Prognosis: Good; patient would benefit from acute skilled PT services to address these deficits and reach maximum level of function.    Recent Surgery: * No surgery found *      Plan:     During this hospitalization, patient to be seen 3 x/week to address the identified rehab impairments via gait training, therapeutic activities, therapeutic exercises and progress toward the following goals:    Plan of Care Expires:  01/29/24    Subjective     Chief Complaint: Pt is motivated to participate  Patient/Family Comments/goals: none stated  Pain/Comfort:  Pain Rating 1: 0/10      Objective:     Communicated with nurse Rodrigues and epic chart review prior to session.  Patient found HOB elevated with bed alarm, vargas catheter, peripheral IV, telemetry (vapotherm) upon PT entry to room.     General Precautions: Standard, fall, respiratory  Orthopedic Precautions: N/A  Braces:  (B darco shoes for standing due to healing wounds)  Respiratory Status:  Vapotherm 36L, 80%     Functional Mobility:  Gait belt applied - Yes  Bed Mobility  Rolling Right: moderate assistance  Scooting: moderate assistance  Supine to Sit: moderate assistance for LE management and trunk management  Transfers  Sit to  "Stand: moderate assistance and of 2 persons with hand-held assist  Bed to Chair: moderate assistance and of 2 persons with hand-held assist using Stand Pivot, increased time needed  Gait  Patient able to take 3 small steps to transfer from bed to chair with hand-held assist and moderate assistance and of 2 persons. Patient demonstrates unsteady gait. No c/o dizziness, SOB with exertion, educated about pursed lip breathing technique and cued for use with mobility. All lines remained intact throughout ambulation trail, vapotherm utilized throughout.  Balance  Sitting: minimum assistance  Standing: moderate assistance and of 2 persons  Pt required frequent verbal cuing for technique     AM-PAC 6 CLICK MOBILITY  Turning over in bed (including adjusting bedclothes, sheets and blankets)?: 2  Sitting down on and standing up from a chair with arms (e.g., wheelchair, bedside commode, etc.): 2  Moving from lying on back to sitting on the side of the bed?: 2  Moving to and from a bed to a chair (including a wheelchair)?: 2  Need to walk in hospital room?: 2  Climbing 3-5 steps with a railing?: 1 (NT)  Basic Mobility Total Score: 11       Treatment & Education:  Reviewed role of PT in acute care and POC. Pt tolerated interventions well. Reviewed importance of OOB activities, activity pacing, and HEP (marching/hip flex, hip abd, heel slides/LAQ, quad sets, ankle pumps) in order to maintain/regain strength. Encouraged to sit up in chair for all meals. Reviewed proper use of RW for safety and to reduce risk of falling. Reviewed "call don't fall" policy and increased risk of falling due to weakness, instructed to utilize call bell for assistance with all transfers. Pt agreeable to all requests.    Patient left up in chair with all lines intact, call button in reach, chair alarm on, and daughter present.    GOALS:   Multidisciplinary Problems       Physical Therapy Goals          Problem: Physical Therapy    Goal Priority " Disciplines Outcome Goal Variances Interventions   Physical Therapy Goal     PT, PT/OT Ongoing, Progressing     Description: Goals to be met by 1/29/24.  1. Pt will complete bed mobility CGA.  2. Pt will complete sit to stand MIN A.  3. Pt will ambulate 50ft MIN A using RW.  4. Pt will increase AMPAC score by 2 points to progress functional mobility.                       Time Tracking:     PT Received On: 01/17/24  PT Start Time: 1116     PT Stop Time: 1141  PT Total Time (min): 25 min     Billable Minutes: Therapeutic Activity 25min    Treatment Type: Evaluation, Treatment  PT/PTA: PT     Number of PTA visits since last PT visit: 0     01/17/2024

## 2024-01-17 NOTE — PT/OT/SLP PROGRESS
Occupational Therapy   Treatment    Name: Moody Silverio  MRN: [id number]  Admitting Diagnosis:  Acute kidney injury superimposed on CKD       Recommendations:     Discharge Recommendations: Moderate Intensity Therapy  Discharge Equipment Recommendations:  to be determined by next level of care  Barriers to discharge:  None    Assessment:     Moody Silverio is a 53 y.o. male with a medical diagnosis of Acute kidney injury superimposed on CKD.  He presents with the following performance deficits affecting function are weakness, impaired endurance, impaired self care skills, impaired functional mobility, impaired balance, impaired cardiopulmonary response to activity, decreased safety awareness, impaired skin, impaired coordination, impaired fine motor.     Rehab Prognosis:  Good and Fair; patient would benefit from acute skilled OT services to address these deficits and reach maximum level of function.       Plan:     Patient to be seen 2 x/week to address the above listed problems via self-care/home management, therapeutic activities, therapeutic exercises  Plan of Care Expires: 01/29/24  Plan of Care Reviewed with: patient    Subjective     Chief Complaint: Eyes closed throughout majority of session. In agreement to transfer.  Patient/Family Comments/goals: none reported  Pain/Comfort:  Pain Rating 1: 0/10    Objective:     Communicated with: NurseRavi, prior to session.  Patient found left sidelying with vargas catheter, peripheral IV, telemetry (vapotherm) upon OT entry to room.    General Precautions: Standard, respiratory, fall    Orthopedic Precautions:N/A  Braces:  (B darco shoes for standing due to healing wounds)  Respiratory Status:  vapotherm     Occupational Performance:     Bed Mobility:    Patient completed Supine to Sit with moderate assistance   Provided with max cueing for technique and for visual orientation  Sat EOB x10 minutes prior to transfer to chair. CGA for static sitting balance. Андрей yadav  shoes on B feet while seated EOB dependently.    Functional Mobility/Transfers:  Patient completed Sit <> Stand Transfer with moderate assistance and of 2 persons  with  hand-held assist   Patient completed Bed <> Chair Transfer using Step Transfer technique with moderate assistance and of 2 persons with hand-held assist  Patient with improved upright posturing this date.  Able to initiate true steps to bedside chair this date.  Max cueing for technique throughout mobility to increase safety and independence with completion    [facility] 6 Click ADL: 14    Treatment & Education:  Patient tolerated intervention fair this date. Gross lethargy and delayed cognition requiring max repetition of commands to engage. Improvements in standing balance. Encouraged completion of B UE AROM therex throughout the day to increase functional strength and activity tolerance needed for ADL completion. Educated on importance of calling for A to transfer back to bed. Encouraged OOB in chair ~1 hour to prevent over exertion and allow patient to actively engage in transfer back to bed. Patient and daughter stated understanding and in agreement with POC.    Patient left up in chair with all lines intact, call button in reach, chair alarm on, and daughter present    GOALS:   Multidisciplinary Problems       Occupational Therapy Goals          Problem: Occupational Therapy    Goal Priority Disciplines Outcome Interventions   Occupational Therapy Goal     OT, PT/OT Ongoing, Progressing    Description: Goals to be met by: 1/29/24     Patient will increase functional independence with ADLs by performing:    Toileting from bedside commode with Minimal Assistance for hygiene and clothing management.   Toilet transfer to bedside commode with Minimal Assistance.  Increased functional strength in B UE grossly by 1/2 MM grade.                         Time Tracking:     OT Date of Treatment: 01/17/24  OT Start Time: 1130  OT Stop Time: 1155  OT Total Time  (min): 25 min    Billable Minutes:Therapeutic Activity 25    Paula Omalley, OT  1/17/2024

## 2024-01-18 NOTE — PLAN OF CARE
A227/A227 ROSE MARY Silverio is a 53 y.o.male admitted on 1/10/2024 for Acute kidney injury superimposed on CKD   Code Status: DNR MRN: [id number]   Review of patient's allergies indicates:   Allergen Reactions    Ceftriaxone Itching and Swelling     Past Medical History:   Diagnosis Date    Diabetes mellitus     Hypertension     Stroke 2017/ 2018      PRN meds    0.9%  NaCl infusion (for blood administration), , Q24H PRN  dextrose 10%, 12.5 g, PRN  dextrose 10%, 12.5 g, PRN  dextrose 10%, 25 g, PRN  dextrose 10%, 25 g, PRN  glucagon (human recombinant), 1 mg, PRN  glucagon (human recombinant), 1 mg, PRN  glucose, 16 g, PRN  glucose, 16 g, PRN  glucose, 24 g, PRN  glucose, 24 g, PRN  hydrALAZINE, 10 mg, Q6H PRN  insulin aspart U-100, 0-10 Units, QID (AC + HS) PRN  naloxone, 0.02 mg, PRN  polyethylene glycol, 17 g, BID PRN  sodium chloride 0.9%, 10 mL, Q12H PRN  traZODone, 100 mg, Nightly PRN      Vas cath placed in right jugular. Pt had dialysis and had 700 removed. Pt will have dialysis on 1/19 too. Bed alarm placed due to fall risk. Pt was turned while on floor. No falls were reported on shift and hourly rounding is complete. Cardiac and glucose monitoring continues. Chart check completed. Will continue plan of care.         Elsah Coma Scale Score: 14     Lead Monitored: Lead II Rhythm: normal sinus rhythm    Cardiac/Telemetry Box Number: [id number]  VTE Required Core Measure: Pharmacological prophylaxis initiated/maintained Last Bowel Movement: 01/10/24  Diet diabetic Renal; 2000 Calorie; Fluid - 1500mL; Standard Tray  Voiding Characteristics: urethral catheter (bladder)  Jurgen Score: 13  Fall Risk Score: 14  Accucheck [x]   Freq? Ac/hs     Lines/Drains/Airways       Central Venous Catheter Line  Duration                  Hemodialysis Catheter 01/18/24 1226 right internal jugular <1 day              Drain  Duration                  Urethral Catheter 01/11/24 1900 16 Fr. 6 days              Peripheral Intravenous Line   Duration                  Hemodialysis AV Fistula Right forearm -- days         Peripheral IV - Single Lumen 01/11/24 1620 22 G Anterior;Left Hand 6 days

## 2024-01-18 NOTE — PLAN OF CARE
01/18/24 1251   Post-Acute Status   Post-Acute Authorization Placement;Dialysis   Post-Acute Placement Status Referrals Sent   Diaylsis Status Set-up Complete/Auth obtained   Coverage [employer]   Discharge Delays None known at this time   Discharge Plan   Discharge Plan A Skilled Nursing Facility       SW meet with Patient and daughters at bedside to discuss SNF placement and outpatient Dialysis placement for Patient. Patient was [facility] by [facility] schedule.   Patient's daughter stated that they are now interested in SNF placement for Patient. SW stated she can send referral out and work to get Patient to SNF upon DC. SW explained that based on facility, Patient's outpatient dialysis may have to change, due to accommodating facility. Patient's daughters verbalized understanding.  SW sent referral to SNF's.    PASRR/ 142 pending.    15 03 SW called Patient's daughter, Peggy, to discuss SNF placement. SW explained that SNF [facility]. Patient inquired about if Patient's family were in agreement with [facility] or [facility]. Patient's family requested reaching out to [facility]. SW reached out to [facility] for acceptance. Per admissions, they will review and call SW back.     SW will continue to follow and assist as needed.

## 2024-01-18 NOTE — CONSULTS
Inpatient consult to Interventional Radiology  Consult performed by: Kyle Lanier PA-C  Consult ordered by: Laya Bartlett MD        Chart reviewed by Dr. Felton.       ASSESSMENT/PLAN:    Stage 4 CKD (HCC)    The order for a Vas Cath for hemodialysis has been placed and the procedure will be performed asap.          Thank you for the consult.

## 2024-01-18 NOTE — SUBJECTIVE & OBJECTIVE
Interval History:  See hospital course above.    Review of Systems   Constitutional:  Negative for fatigue and fever.   HENT:  Negative for sinus pressure.    Eyes:  Positive for visual disturbance.   Respiratory:  Positive for shortness of breath.    Cardiovascular:  Positive for leg swelling. Negative for chest pain.   Gastrointestinal:  Negative for nausea and vomiting.   Genitourinary:  Negative for difficulty urinating.   Musculoskeletal:  Negative for back pain.   Skin:  Negative for rash.   Neurological:  Negative for headaches.   Psychiatric/Behavioral:  Negative for confusion.      Objective:     Vital Signs (Most Recent):  Temp: 98.6 °F (37 °C) (01/18/24 0715)  Pulse: 80 (01/18/24 0729)  Resp: 20 (01/18/24 0729)  BP: 133/62 (01/18/24 0715)  SpO2: (!) 94 % (01/18/24 0729) Vital Signs (24h Range):  Temp:  [98.5 °F (36.9 °C)-98.9 °F (37.2 °C)] 98.6 °F (37 °C)  Pulse:  [76-95] 80  Resp:  [16-24] 20  SpO2:  [93 %-99 %] 94 %  BP: (133-158)/(62-89) 133/62     Weight: 127.6 kg (281 lb 4.9 oz)  Body mass index is 37.11 kg/m².    Intake/Output Summary (Last 24 hours) at 1/18/2024 1120  Last data filed at 1/18/2024 0729  Gross per 24 hour   Intake --   Output 585 ml   Net -585 ml         Physical Exam  Constitutional:       General: He is not in acute distress.     Appearance: He is well-developed. He is obese. He is not ill-appearing or diaphoretic.   HENT:      Head: Normocephalic and atraumatic.   Eyes:      Pupils: Pupils are equal, round, and reactive to light.   Cardiovascular:      Rate and Rhythm: Normal rate and regular rhythm.      Heart sounds: Normal heart sounds. No murmur heard.     No friction rub. No gallop.   Pulmonary:      Effort: Pulmonary effort is normal. No respiratory distress.      Breath sounds: No stridor. Rales present. No wheezing.   Abdominal:      General: Bowel sounds are normal. There is no distension.      Palpations: Abdomen is soft. There is no mass.      Tenderness: There is no  abdominal tenderness. There is no guarding.   Musculoskeletal:      Right lower leg: Edema present.      Left lower leg: Edema present.   Skin:     General: Skin is warm.      Findings: No erythema.   Neurological:      Mental Status: He is alert and oriented to person, place, and time.             Significant Labs: All pertinent labs within the past 24 hours have been reviewed.    Significant Imaging: I have reviewed all pertinent imaging results/findings within the past 24 hours.

## 2024-01-18 NOTE — PT/OT/SLP PROGRESS
Physical Therapy      Patient Name:  Moody Silverio   MRN:  [id number]    Chart review complete. Presented to pt's room at 1432. Patient not seen today secondary to pt OOR for Dialysis. Will continue efforts.    Sagrario Hardin, PT, DPT  1/18/2024   1432

## 2024-01-18 NOTE — NURSING
01/17/24 1745   Pre-Hemodialysis Assessment   Pre-Hemodialysis Comments Pt not dialyzed d/t unable to access fistula, Dr Dhruv hearn, primary nurse notified.        Hemodialysis AV Fistula Right forearm   No placement date or time found.   Present Prior to Hospital Arrival?: Yes  Location: Right forearm   Needle Size 17ga   Site Assessment Clean;Dry;Intact;No redness   Patency Present;Thrill;Bruit   Status Not Accessed   Flows Other (Comment)  (unable to access fistula)

## 2024-01-18 NOTE — PROGRESS NOTES
"[facility] ([facility])  Nephrology  Progress Note    Patient Name: Moody Silverio  MRN: [id number]  Admission Date: 1/10/2024  Hospital Length of Stay: 8 days  Attending Provider: Abhijeet Kennedy MD   Primary Care Physician: Jersey Nolan MD  Principal Problem:Acute kidney injury superimposed on CKD    Subjective:     HPI: Noted    Interval History: Pt was seen and examined. Labs and meds reviewed. Discussed with other providers. Pt feels worse, has SOB. The AVF was cannulated yesterday (first time use, had been placed in Sept 2023 by Dr. Tres Alfonso). The AVF is too small/narrow and cannulation was unsuccessful. Pt agreed to IJ vas cath placement (has a trialysis catheter). Pt was revisited during HD. Feels "OK", no CP, no dizziness.    Review of patient's allergies indicates:   Allergen Reactions    Ceftriaxone Itching and Swelling     Current Facility-Administered Medications   Medication Frequency    0.9%  NaCl infusion (for blood administration) Q24H PRN    amLODIPine tablet 10 mg Daily    atorvastatin tablet 40 mg Nightly    bumetanide injection 2 mg BID    carvediloL tablet 25 mg BID WM    clopidogreL tablet 75 mg Daily    dextrose 10% bolus 125 mL 125 mL PRN    dextrose 10% bolus 125 mL 125 mL PRN    dextrose 10% bolus 250 mL 250 mL PRN    dextrose 10% bolus 250 mL 250 mL PRN    DULoxetine DR capsule 60 mg Daily    enoxaparin injection 30 mg Q24H (prophylaxis, 1700)    fluticasone propionate 50 mcg/actuation nasal spray 100 mcg Daily    gabapentin capsule 300 mg BID    glucagon (human recombinant) injection 1 mg PRN    glucagon (human recombinant) injection 1 mg PRN    glucose chewable tablet 16 g PRN    glucose chewable tablet 16 g PRN    glucose chewable tablet 24 g PRN    glucose chewable tablet 24 g PRN    hydrALAZINE injection 10 mg Q6H PRN    hydrALAZINE tablet 100 mg TID    insulin aspart U-100 pen 0-10 Units QID (AC + HS) PRN    insulin detemir U-100 (Levemir) pen 14 Units Daily    isosorbide " mononitrate 24 hr tablet 60 mg Daily    [START ON 1/19/2024] levoFLOXacin 500 mg/100 mL IVPB 500 mg Q48H    mannitol 25% injection 25 g Once    mannitol 25% injection 25 g Once    metOLazone tablet 5 mg Daily    naloxone 0.4 mg/mL injection 0.02 mg PRN    polyethylene glycol packet 17 g BID PRN    sodium bicarbonate tablet 650 mg TID    sodium chloride 0.9% flush 10 mL Q12H PRN    tamsulosin 24 hr capsule 0.4 mg Daily    traZODone tablet 100 mg Nightly PRN       Objective:     Vital Signs (Most Recent):  Temp: 98.6 °F (37 °C) (01/18/24 0715)  Pulse: 78 (01/18/24 1110)  Resp: 20 (01/18/24 0729)  BP: 133/62 (01/18/24 0715)  SpO2: (!) 94 % (01/18/24 0729) Vital Signs (24h Range):  Temp:  [98.5 °F (36.9 °C)-98.9 °F (37.2 °C)] 98.6 °F (37 °C)  Pulse:  [76-95] 78  Resp:  [16-24] 20  SpO2:  [93 %-99 %] 94 %  BP: (133-158)/(62-89) 133/62     Weight: 127.6 kg (281 lb 4.9 oz) (01/17/24 0442)  Body mass index is 37.11 kg/m².  Body surface area is 2.56 meters squared.    I/O last 3 completed shifts:  In: -   Out: 775 [Urine:775]     Physical Exam  Vitals and nursing note reviewed.   Constitutional:       Appearance: Normal appearance.   Cardiovascular:      Rate and Rhythm: Normal rate and regular rhythm.      Pulses: Normal pulses.      Heart sounds: Normal heart sounds.   Pulmonary:      Effort: Pulmonary effort is normal.      Breath sounds: Normal breath sounds.   Abdominal:      Palpations: Abdomen is soft.   Musculoskeletal:      Right lower leg: No edema.      Left lower leg: No edema.   Neurological:      Mental Status: He is alert and oriented to person, place, and time.   Psychiatric:         Behavior: Behavior normal.          Significant Labs: reviewed  BMP  Lab Results   Component Value Date     (L) 01/18/2024    K 4.5 01/18/2024    CL 96 01/18/2024    CO2 17 (L) 01/18/2024     (H) 01/18/2024    CREATININE 10.6 (H) 01/18/2024    CALCIUM 6.9 (LL) 01/18/2024    ANIONGAP 16 01/18/2024    EGFRNORACEVR 5  (A) 01/18/2024     Lab Results   Component Value Date    WBC 15.41 (H) 01/18/2024    HGB 7.2 (L) 01/18/2024    HCT 21.5 (L) 01/18/2024    MCV 88 01/18/2024     01/18/2024     Lab Results   Component Value Date    CALCIUM 6.9 (LL) 01/18/2024    PHOS 8.4 (H) 01/18/2024     Lab Results   Component Value Date    IRON 13 (L) 01/12/2024    TRANSFERRIN 123 (L) 01/12/2024    TIBC 182 (L) 01/12/2024    FESATURATED 7 (L) 01/12/2024        Significant Imaging: CXR: looks worse with pulmonary edema  Assessment/Plan:     52 y/o male with CKD stage 5 presented with volume overload:     Stage 5 chronic kidney disease due to type 2 diabetes mellitus  Advanced CKD stage 5 leading to ESRD  CKD likely due to DM and HTN  Severe and symptomatic fluid overload and pulmonary edema  Poor response to diuretics (PO and IV)  Pt has started chronic HD  Pt is tolerating HD well, continue HD     Complications of CKD:  K normal  Mild hyponatremia, will correct with HD  Metabolic acidosis, moderate, will improve with HD, emery d/c po bicarbonate  Mild hypocalcemia, stable  Hyperphosphatemia, will start renvela 2 po tid  iPTH still pending, will re-order  Anemia, on epogen. Low iron sat, on venofer IV loading dose     Volume overload. Pulmonary edema:  Worse. Pt is symptomatic  Spoke extensively with pt and family again today.     Vascular: Has a right UE AVF, placed by Dr. Alfonso in Sept 2023  Cannulation was not successful,   The AVF is not ready, is too small  Has a trialysis catheter  IR could not place a tunneled vas cath today because pt had taken plavix  Will need a tunneled vas cath before d/c     Hepatitis panel for oupt HD unit placement pending  Consulted SW (lives in [facility])    Receiving mannitol to lower risk of DDS  HD#1 today  HD#2 tomorrow      CHF (congestive heart failure)  Pulmonary edema  Less response to diuretics now  CXR looks worse     Essential hypertension  BP controlled and improved  Meds reviewed     Type 2  diabetes mellitus  Long standing  Reviewed the chart     Plans and recommendations:  As discussed above  Risks and benefits of hemodialysis initiation were explained to the pt. Benefits include correction of hyperkalemia and other electrolyte disorders, maintanance of fluid balance, and improvement in oxygenation. Risks include changes in fluid status, heart failure, and death.  Total time spent 40 minutes including time needed to review the records, the   patient evaluation, documentation, face-to-face discussion with the patient,            Laya Bartlett MD  Nephrology  [facility] ([facility])

## 2024-01-18 NOTE — SUBJECTIVE & OBJECTIVE
"Interval History: Pt was seen and examined. Labs and meds reviewed. Discussed with other providers. Pt feels worse, has SOB. The AVF was cannulated yesterday (first time use, had been placed in Sept 2023 by Dr. Tres Alfonso). The AVF is too small/narrow and cannulation was unsuccessful. Pt agreed to IJ vas cath placement (has a trialysis catheter). Pt was revisited during HD. Feels "OK", no CP, no dizziness.    Review of patient's allergies indicates:   Allergen Reactions    Ceftriaxone Itching and Swelling     Current Facility-Administered Medications   Medication Frequency    0.9%  NaCl infusion (for blood administration) Q24H PRN    amLODIPine tablet 10 mg Daily    atorvastatin tablet 40 mg Nightly    bumetanide injection 2 mg BID    carvediloL tablet 25 mg BID WM    clopidogreL tablet 75 mg Daily    dextrose 10% bolus 125 mL 125 mL PRN    dextrose 10% bolus 125 mL 125 mL PRN    dextrose 10% bolus 250 mL 250 mL PRN    dextrose 10% bolus 250 mL 250 mL PRN    DULoxetine DR capsule 60 mg Daily    enoxaparin injection 30 mg Q24H (prophylaxis, 1700)    fluticasone propionate 50 mcg/actuation nasal spray 100 mcg Daily    gabapentin capsule 300 mg BID    glucagon (human recombinant) injection 1 mg PRN    glucagon (human recombinant) injection 1 mg PRN    glucose chewable tablet 16 g PRN    glucose chewable tablet 16 g PRN    glucose chewable tablet 24 g PRN    glucose chewable tablet 24 g PRN    hydrALAZINE injection 10 mg Q6H PRN    hydrALAZINE tablet 100 mg TID    insulin aspart U-100 pen 0-10 Units QID (AC + HS) PRN    insulin detemir U-100 (Levemir) pen 14 Units Daily    isosorbide mononitrate 24 hr tablet 60 mg Daily    [START ON 1/19/2024] levoFLOXacin 500 mg/100 mL IVPB 500 mg Q48H    mannitol 25% injection 25 g Once    mannitol 25% injection 25 g Once    metOLazone tablet 5 mg Daily    naloxone 0.4 mg/mL injection 0.02 mg PRN    polyethylene glycol packet 17 g BID PRN    sodium bicarbonate tablet 650 mg TID    " sodium chloride 0.9% flush 10 mL Q12H PRN    tamsulosin 24 hr capsule 0.4 mg Daily    traZODone tablet 100 mg Nightly PRN       Objective:     Vital Signs (Most Recent):  Temp: 98.6 °F (37 °C) (01/18/24 0715)  Pulse: 78 (01/18/24 1110)  Resp: 20 (01/18/24 0729)  BP: 133/62 (01/18/24 0715)  SpO2: (!) 94 % (01/18/24 0729) Vital Signs (24h Range):  Temp:  [98.5 °F (36.9 °C)-98.9 °F (37.2 °C)] 98.6 °F (37 °C)  Pulse:  [76-95] 78  Resp:  [16-24] 20  SpO2:  [93 %-99 %] 94 %  BP: (133-158)/(62-89) 133/62     Weight: 127.6 kg (281 lb 4.9 oz) (01/17/24 0442)  Body mass index is 37.11 kg/m².  Body surface area is 2.56 meters squared.    I/O last 3 completed shifts:  In: -   Out: 775 [Urine:775]     Physical Exam  Vitals and nursing note reviewed.   Constitutional:       Appearance: Normal appearance.   Cardiovascular:      Rate and Rhythm: Normal rate and regular rhythm.      Pulses: Normal pulses.      Heart sounds: Normal heart sounds.   Pulmonary:      Effort: Pulmonary effort is normal.      Breath sounds: Normal breath sounds.   Abdominal:      Palpations: Abdomen is soft.   Musculoskeletal:      Right lower leg: No edema.      Left lower leg: No edema.   Neurological:      Mental Status: He is alert and oriented to person, place, and time.   Psychiatric:         Behavior: Behavior normal.          Significant Labs: reviewed  BMP  Lab Results   Component Value Date     (L) 01/18/2024    K 4.5 01/18/2024    CL 96 01/18/2024    CO2 17 (L) 01/18/2024     (H) 01/18/2024    CREATININE 10.6 (H) 01/18/2024    CALCIUM 6.9 (LL) 01/18/2024    ANIONGAP 16 01/18/2024    EGFRNORACEVR 5 (A) 01/18/2024     Lab Results   Component Value Date    WBC 15.41 (H) 01/18/2024    HGB 7.2 (L) 01/18/2024    HCT 21.5 (L) 01/18/2024    MCV 88 01/18/2024     01/18/2024     Lab Results   Component Value Date    CALCIUM 6.9 (LL) 01/18/2024    PHOS 8.4 (H) 01/18/2024     Lab Results   Component Value Date    IRON 13 (L) 01/12/2024     TRANSFERRIN 123 (L) 01/12/2024    TIBC 182 (L) 01/12/2024    FESATURATED 7 (L) 01/12/2024        Significant Imaging: CXR: looks worse with pulmonary edema

## 2024-01-18 NOTE — ASSESSMENT & PLAN NOTE
Patient with Hypoxic Respiratory failure which is Acute.  he is not on home oxygen. Supplemental oxygen was provided and noted- Oxygen Concentration (%):  [60-90] 80    .   Signs/symptoms of respiratory failure include- tachypnea, increased work of breathing, respiratory distress, and use of accessory muscles. Contributing diagnoses includes - CHF Labs and images were reviewed. Patient Has not had a recent ABG. Will treat underlying causes and adjust management of respiratory failure as follows-       Cont levaquin  Cont diuresis   Wean o2 as tolerated  Continue to wean Vapotherm as tolerated

## 2024-01-18 NOTE — NURSING
01/18/24 1600   Vital Signs   Temp 98.1 °F (36.7 °C)   Temp Source Axillary   Pulse 81   Heart Rate Source Monitor   Resp 20   BP (!) 177/85   BP Location Left arm   BP Method Automatic   Patient Position Lying   Post-Hemodialysis Assessment   Rinseback Volume (mL) 250 mL   Blood Volume Processed (Liters) 45.5 L   Dialyzer Clearance Lightly streaked   Duration of Treatment 150 minutes   Additional Fluid Intake (mL) 0 mL   Total UF (mL) 1250 mL   Net Fluid Removal 750   Post-Hemodialysis Comments 2.5hr H.D. tx completed. Pt. tolerated w/o issues. Given mannitol as directed per Dr. Bartlett. Pt de-accessed per P & P, report given to primary nurse. Pt visited by Dr. Bartlett during Tx.

## 2024-01-18 NOTE — ASSESSMENT & PLAN NOTE
54 y/o male with CKD stage 5 presented with volume overload:     Stage 5 chronic kidney disease due to type 2 diabetes mellitus  Stable renal function, CKD stage 5  CKD likely due to DM and HTN  Poor response to diuretics (PO and IV)  Without IV diuretics, pt is very SOB; even with IV, SOB has persisted  Pt agreed to start chronic HD     Complications of CKD:  K normal  Mild hyponatremia, will correct with HD  Metabolic acidosis, moderate, will improve with HD  Mild hypocalcemia, stable  PO4 not high  iPTH pending  Anemia, on epogen. Low iron sat, on venofer IV loading dose     Volume overload. Pulmonary edema:  Worse. Pt is symptomatic  Spoke extensively with pt and family twice today. Reviewed today     Hepatitis panel for oupt HD unit placement pending  Consulted SW (lives in [facility])  Will get mannitol to lower risk of DDS  HD#1 today  HD#2 tomorrow      CHF (congestive heart failure)  Pulmonary edema  Less response to diuretics now  CXR looks worse     Essential hypertension  BP controlled and improved  Meds reviewed     Type 2 diabetes mellitus  Long standing  Reviewed the chart     Plans and recommendations:  As discussed above  Risks and benefits of hemodialysis initiation were explained to the pt. Benefits include correction of hyperkalemia and other electrolyte disorders, maintanance of fluid balance, and improvement in oxygenation. Risks include changes in fluid status, heart failure, and death.  Total time spent 40 minutes including time needed to review the records, the   patient evaluation, documentation, face-to-face discussion with the patient,

## 2024-01-18 NOTE — INTERVAL H&P NOTE
The patient has been examined and the H&P has been reviewed:    I concur with the findings and no changes have occurred since H&P was written.        Active Hospital Problems    Diagnosis  POA    *Acute kidney injury superimposed on CKD [N17.9, N18.9]  Yes    ESRD (end stage renal disease) [N18.6]  Unknown    Debility [R53.81]  Yes    Acute hypoxic respiratory failure [J96.01]  Yes    Stage 5 chronic kidney disease due to type 2 diabetes mellitus [E11.22, N18.5]  Yes    Benign prostatic hyperplasia with urinary obstruction [N40.1, N13.8]  Yes    CHF (congestive heart failure) [I50.9]  Yes    Type 2 diabetes mellitus [E11.9]  Yes     Last Assessment & Plan:   Formatting of this note might be different from the original.  History & Physical Clarify his home regimen and continue.  Check an A1c.    Discharge Summary He has a history of DM 2 with A1c 7 on Trulicity, glipizide, and Tresiba, which he will continue on discharge.    Follow-up Glucoses are fairly well controlled with Lantus 45 units and diabetic diet.  His hemoglobin A1c is 7.0%.  Glipizide is on hold.  Formatting of this note might be different from the original.  Last Assessment & Plan:   History & Physical Clarify his home regimen and continue.  Check an A1c.    Discharge Summary He has a history of DM 2 with A1c 7 on Trulicity, glipizide, and Tresiba, which he will continue on discharge.    Follow-up Glucoses are fairly well controlled with Lantus 45 units and diabetic diet.  His hemoglobin A1c is 7.0%.  Glipizide is on hold.      Anemia in chronic kidney disease (CKD) [N18.9, D63.1]  Yes     Last Assessment & Plan:   Formatting of this note might be different from the original.  History & Physical Suspect this is due to chronic kidney disease.  We will check stools for occult blood, iron indices, ferritin, reticulocyte as well as B12 and folic acid.    Discharge Summary He has a history of anemia related to chronic kidney disease.  Iron and B12 levels  were normal.  Folate level was low.  He received erythropoietin while inpatient.  He will be discharged with iron and folic acid.  Hemoglobin at last check was 8 on 8/1.  Continue outpatient follow-up.    Follow-up  He has anemia related to chronic kidney disease as well as a low folate level.  Iron and B12 levels were normal.  Continue his usual iron and folic acid.      Essential hypertension [I10]  Yes     Last Assessment & Plan:   Formatting of this note might be different from the original.  History & Physical Blood pressure is acceptable.  Continue his home medications.    Discharge Summary Blood pressures were stable on his usual amlodipine with increased carvedilol and hydralazine doses after discontinuation of ACE inhibitor.    Follow-up Improved control with amlodipine, carvedilol, hydralazine, and Lasix.  Monitor.        Resolved Hospital Problems   No resolved problems to display.

## 2024-01-18 NOTE — ASSESSMENT & PLAN NOTE
Patient is identified as having  unknown, TTE pending  heart failure that is Acute. CHF is currently uncontrolled due to Rales/crackles on pulmonary exam and Pulmonary edema/pleural effusion on CXR. Latest ECHO performed and demonstrates- No results found for this or any previous visit.  .  Monitor strict Is&Os and daily weights.    Place on fluid restriction of 1.5 L.   Last BNP reviewed- and noted below   Recent Labs   Lab 01/15/24  0819   *       TTE pending  Consult Cardiology    1/18:   Cardiology on case   Echo reviewed   Normal EF   Likely diastolic heart failure exacerbation  Patient still appears overloaded   Will continue Bumex 2 mg q.8h  Metolazone   Dialysis to be initiated   normal

## 2024-01-18 NOTE — ASSESSMENT & PLAN NOTE
Patient with acute kidney injury/acute renal failure likely due to post-obstructive d/t bladder outlet obstruction  ELMIRA is currently worsening. Baseline creatinine unknown - Labs reviewed- Renal function/electrolytes with Estimated Creatinine Clearance: 11 mL/min (A) (based on SCr of 10.6 mg/dL (H)). according to latest data. Monitor urine output and serial BMP and adjust therapy as needed. Avoid nephrotoxins and renally dose meds for GFR listed above.    Hold further IV diuretics for now  Consult Nephrology  Strict I/O's, avoid nephrotoxins  May require HD, has right AVF since Sept '23.    1/18:   Creatinine continues to worsen   Patient continues to be volume overloaded despite metolazone and Bumex   Discussion was held on initiating dialysis   Patient agreeable   IR consulted for Vas-Cath placement   Right AV fistula possibly not matured as of yet  Attempted dialysis yesterday was unsuccessful

## 2024-01-18 NOTE — NURSING
01/18/24 0915   WOCN Assessment   WOCN Total Time (mins) 30   Visit Date 01/18/24   Visit Time 0915   Consult Type Follow Up   WOCN Speciality Wound   Wound other   Intervention assessed;changed   Teaching on-going        Altered Skin Integrity 01/11/24 1015 Left Plantar Ulceration   Date First Assessed/Time First Assessed: 01/11/24 1015   Altered Skin Integrity Present on Admission - Did Patient arrive to the hospital with altered skin?: yes  Side: Left  Location: Plantar  Primary Wound Type: (c) Ulceration   Wound Image    Dressing Appearance Dry;Intact;Clean   Drainage Amount None   Appearance Dry;[place]   Periwound Area Blistered  (to upper wound/under toes)   Wound Length (cm) 1.5 cm   Wound Width (cm) 0.75 cm   Wound Depth (cm) 0.1 cm   Wound Volume (cm^3) 0.1125 cm^3   Wound Surface Area (cm^2) 1.125 cm^2   Care Cleansed with:;Wound cleanser   Dressing Applied;Gauze;Other (comment)  (betadine, covaderm gauze)   Periwound Care Skin barrier film applied        Altered Skin Integrity 01/11/24 1015 Right Plantar Ulceration   Date First Assessed/Time First Assessed: 01/11/24 1015   Altered Skin Integrity Present on Admission - Did Patient arrive to the hospital with altered skin?: yes  Side: Right  Location: Plantar  Primary Wound Type: (c) Ulceration   Wound Image    Dressing Appearance Dry;Clean;Intact   Drainage Amount None   Drainage Characteristics/Odor No odor   Appearance [place];Dry   Periwound Area Intact   Wound Length (cm) 1 cm   Wound Width (cm) 0.5 cm   Wound Depth (cm) 0.1 cm   Wound Volume (cm^3) 0.05 cm^3   Wound Surface Area (cm^2) 0.5 cm^2   Care Cleansed with:;Sterile normal saline   Dressing Applied;Gauze;Other (comment)  (betadine and covaderm gauze)

## 2024-01-18 NOTE — PROGRESS NOTES
"[facility] Medicine  Progress Note    Patient Name: Moody Silverio  MRN: [id number]  Patient Class: IP- Inpatient   Admission Date: 1/10/2024  Length of Stay: 8 days  Attending Physician: Abhijeet Kennedy MD  Primary Care Provider: Jersey Nolan MD        Subjective:     Principal Problem:Acute kidney injury superimposed on CKD        HPI:  Moody Silverio is 54 y/o male with PMHx IDDM, HTN, CKD, Hx of CVA with right hemiparesis, anemia of chronic disease, BPH, chronic back pain, anxiety who presented to [facility] on 1-8-24 with c/o SOB due to volume overload/CHF exacerbation and pneumonia and also acute on CKD. History obtained from records sent from outside hospital, patient, and family at bedside. Patient remained at outside hospital ER till transfer to [facility] today (1-10-24). Patient arrived to [facility] with Valera in place and on supplemental O2. Patient with right forearm AV shunt. Patient reports continued SOB, constipation, abdominal distention, fatigue.    Outside hospital ER labs/imaging reviewed:  WBC 11  BUN/Cr 77/4.8 (1/8/24); Cr 4.55    H/H 8.6/27.4  CXR with bilateral upper and lower lobe patchy hazy pulmonary opacities either edema or pneumonia (1/9/24)  CT Chest "bilateral pneumonia with an underlying component of volume overload." (1/8/24)    ER course reviewed: patient was initially placed on BiPAP, transitioned to NC (2-3 L), and also required Valera cathter placement due to urinary outlet obstruction. He was treated with pneumonia and CHF exacerbation, received ceftriaxone, azithromycin, doxycycline, DuoNebs, Bumex IV, Lasix IV. Also noted he developed reaction to ceftriaxone, listed as allergy[facility] as [facility] to [facility] on 1[facility]8-24.    Overview/Hospital Course:  1/11:  Nephrology and cardiology on case.  Recommend Bumex 2 mg q.8h.  Will continue p.o. doxycycline.  " Wean O2 as tolerated.  Echo showed normal EF.  Likely diastolic heart failure exacerbation.  1/12:  Patient requiring high-flow Vapotherm today, will repeat stat chest x-ray.  Continue with diuresis.  Anemia noted, likely to CKD.  Will check iron studies.  Consider EPO and iron replacement.  1/13:  Patient currently on 30 L 60% FiO2.  Continue with diuresis.  Start IV Levaquin for pneumonia.  Anemia again noted, will transfuse 1 unit packed RBCs.  Likely secondary to CKD and iron deficiency.  Start iron replacement therapy.  EPO given.  1/14:  Patient on 30 L 85% FiO2.  Continue diuresis.  Continue Levaquin.  H&H improved status post 1 unit packed RBC.  Continue iron replacement therapy.  1/15: weaned down to 30L 60% fio2. Cont levaquin for pna. H/h trending down, no active signs of bleeding noted. Cont to monitor. Cont on iv bumex and metolazone for diuresis. Pt/OT eval.   1/16:.  Patient required BiPAP overnight however only wore it for a few hours.  Currently on 36 L 90% Vapotherm.  Pulmonology on case.  Continue diuresis.  Nephrology on case.  Repeat chest x-ray today.  Continue Levaquin.  PT/OT recommending SNF placement.  1/17:.  Patient continues to be volume overloaded.  Creatinine trending up.  Nephrology to start dialysis.  Patient with right AV fistula in forearm.  Continue empiric treatment for pneumonia.  1/18:  Dialysis attempted yesterday however right AV fistula did not function properly.  Likely requires more time for maturation.  Family discussion held as patient voiced not wanting to initiate dialysis and just wanting to go home.  Discussion was held with myself, nephrologist, patient, and 2 daughters.  Discussed that patient may likely improve with trial of dialysis and also with the expected course will be should he decide to forego dialysis.  Hospice was also discussed.  Patient agreeable to initiate dialysis.  IR consulted for Vas-Cath placement.    Interval History:  See hospital course  above.    Review of Systems   Constitutional:  Negative for fatigue and fever.   HENT:  Negative for sinus pressure.    Eyes:  Positive for visual disturbance.   Respiratory:  Positive for shortness of breath.    Cardiovascular:  Positive for leg swelling. Negative for chest pain.   Gastrointestinal:  Negative for nausea and vomiting.   Genitourinary:  Negative for difficulty urinating.   Musculoskeletal:  Negative for back pain.   Skin:  Negative for rash.   Neurological:  Negative for headaches.   Psychiatric/Behavioral:  Negative for confusion.      Objective:     Vital Signs (Most Recent):  Temp: 98.6 °F (37 °C) (01/18/24 0715)  Pulse: 80 (01/18/24 0729)  Resp: 20 (01/18/24 0729)  BP: 133/62 (01/18/24 0715)  SpO2: (!) 94 % (01/18/24 0729) Vital Signs (24h Range):  Temp:  [98.5 °F (36.9 °C)-98.9 °F (37.2 °C)] 98.6 °F (37 °C)  Pulse:  [76-95] 80  Resp:  [16-24] 20  SpO2:  [93 %-99 %] 94 %  BP: (133-158)/(62-89) 133/62     Weight: 127.6 kg (281 lb 4.9 oz)  Body mass index is 37.11 kg/m².    Intake/Output Summary (Last 24 hours) at 1/18/2024 1120  Last data filed at 1/18/2024 0729  Gross per 24 hour   Intake --   Output 585 ml   Net -585 ml         Physical Exam  Constitutional:       General: He is not in acute distress.     Appearance: He is well-developed. He is obese. He is not ill-appearing or diaphoretic.   HENT:      Head: Normocephalic and atraumatic.   Eyes:      Pupils: Pupils are equal, round, and reactive to light.   Cardiovascular:      Rate and Rhythm: Normal rate and regular rhythm.      Heart sounds: Normal heart sounds. No murmur heard.     No friction rub. No gallop.   Pulmonary:      Effort: Pulmonary effort is normal. No respiratory distress.      Breath sounds: No stridor. Rales present. No wheezing.   Abdominal:      General: Bowel sounds are normal. There is no distension.      Palpations: Abdomen is soft. There is no mass.      Tenderness: There is no abdominal tenderness. There is no  guarding.   Musculoskeletal:      Right lower leg: Edema present.      Left lower leg: Edema present.   Skin:     General: Skin is warm.      Findings: No erythema.   Neurological:      Mental Status: He is alert and oriented to person, place, and time.             Significant Labs: All pertinent labs within the past 24 hours have been reviewed.    Significant Imaging: I have reviewed all pertinent imaging results/findings within the past 24 hours.    Assessment/Plan:      * Acute kidney injury superimposed on CKD  Patient with acute kidney injury/acute renal failure likely due to post-obstructive d/t bladder outlet obstruction  ELMIRA is currently worsening. Baseline creatinine unknown - Labs reviewed- Renal function/electrolytes with Estimated Creatinine Clearance: 11 mL/min (A) (based on SCr of 10.6 mg/dL (H)). according to latest data. Monitor urine output and serial BMP and adjust therapy as needed. Avoid nephrotoxins and renally dose meds for GFR listed above.    Hold further IV diuretics for now  Consult Nephrology  Strict I/O's, avoid nephrotoxins  May require HD, has right AVF since Sept '23.    1/18:   Creatinine continues to worsen   Patient continues to be volume overloaded despite metolazone and Bumex   Discussion was held on initiating dialysis   Patient agreeable   IR consulted for Vas-Cath placement   Right AV fistula possibly not matured as of yet  Attempted dialysis yesterday was unsuccessful       Debility  PT OT recommending SNF  However patient declining placement  Will plan for home health once ready for discharge    Acute hypoxic respiratory failure  Patient with Hypoxic Respiratory failure which is Acute.  he is not on home oxygen. Supplemental oxygen was provided and noted- Oxygen Concentration (%):  [60-90] 80    .   Signs/symptoms of respiratory failure include- tachypnea, increased work of breathing, respiratory distress, and use of accessory muscles. Contributing diagnoses includes - CHF  Labs and images were reviewed. Patient Has not had a recent ABG. Will treat underlying causes and adjust management of respiratory failure as follows-       Cont levaquin  Cont diuresis   Wean o2 as tolerated  Continue to wean Vapotherm as tolerated      CHF (congestive heart failure)  Patient is identified as having  unknown, TTE pending  heart failure that is Acute. CHF is currently uncontrolled due to Rales/crackles on pulmonary exam and Pulmonary edema/pleural effusion on CXR. Latest ECHO performed and demonstrates- No results found for this or any previous visit.  .  Monitor strict Is&Os and daily weights.    Place on fluid restriction of 1.5 L.   Last BNP reviewed- and noted below   Recent Labs   Lab 01/15/24  0819   *       TTE pending  Consult Cardiology    1/18:   Cardiology on case   Echo reviewed   Normal EF   Likely diastolic heart failure exacerbation  Patient still appears overloaded   Will continue Bumex 2 mg q.8h  Metolazone   Dialysis to be initiated    Benign prostatic hyperplasia with urinary obstruction  Valera in place on arrival, [facility]  CT C/A/P pending  Consider Urology consult pending CT results  Strict I/O's      Type 2 diabetes mellitus  Patient's FSGs are uncontrolled due to hyperglycemia on current medication regimen.  Last A1c reviewed-   Lab Results   Component Value Date    HGBA1C 6.2 (H) 01/10/2024     Most recent fingerstick glucose reviewed-   Recent Labs   Lab 01/15/24  1621 01/15/24  2019 01/16/24  0633   POCTGLUCOSE 184* 188* 161*       Current correctional scale  Medium  Maintain anti-hyperglycemic dose as follows-   Antihyperglycemics (From admission, onward)      Start     Stop Route Frequency Ordered    01/13/24 0900  insulin detemir U-100 (Levemir) pen 14 Units         -- SubQ Daily 01/13/24 0726    01/10/24 1728  insulin aspart U-100 pen 0-10 Units         -- SubQ Before meals & nightly PRN 01/10/24 1629          Hold Oral hypoglycemics while patient  is in the hospital.      Glucose controlled  Will continue Levemir 14 units   Continue sliding scale    Anemia in chronic kidney disease (CKD)  H/h trending down  No active signs of bleeding  Cont to monitor  Continue p.o. iron supplementation  Nephrology added Venofer   He has received 1 dose EPO this stay    Essential hypertension  Chronic, controlled. Latest blood pressure and vitals reviewed-     Temp:  [98.2 °F (36.8 °C)]   Pulse:  [85-92]   Resp:  [18-22]   BP: (174-180)/(73-75)   SpO2:  [95 %-96 %] .   Home meds for hypertension were reviewed and noted below.   Hypertension Medications               amLODIPine (NORVASC) 10 MG tablet Take 10 mg by mouth once daily.    carvediloL (COREG) 25 MG tablet Take 25 mg by mouth 2 (two) times daily.    furosemide (LASIX) 80 MG tablet Take 80 mg by mouth 2 (two) times daily.    hydrALAZINE (APRESOLINE) 50 MG tablet Take 50 mg by mouth 3 (three) times daily.            While in the hospital, will manage blood pressure as follows; Continue home antihypertensive regimen    Will utilize p.r.n. blood pressure medication only if patient's blood pressure greater than 180/110 and he develops symptoms such as worsening chest pain or shortness of breath.      VTE Risk Mitigation (From admission, onward)           Ordered     enoxaparin injection 30 mg  Every 24 hours         01/14/24 0958     IP VTE LOW RISK PATIENT  Once         01/10/24 1557     Place sequential compression device  Until discontinued         01/10/24 1557                    Discharge Planning   ALONDRA:      Code Status: DNR   Is the patient medically ready for discharge?:     Reason for patient still in hospital (select all that apply): Patient trending condition  Discharge Plan A: Home Health   Discharge Delays: None known at this time              Abhijeet Kennedy MD  Department of Hospital Medicine   [facility] - Telemetry ([facility])

## 2024-01-18 NOTE — PLAN OF CARE
SW received a call from Patient's daughter, Peggy, regarding Patient's wishes. Per Patient's daughter, she had a long conversation with patient yesterday regarding wishes. Patient expressed that he just wished to go home with no dialysis. Patient wishes for comfort care and just wishes to go home with Hospice care. SW stated she can get that started for Patient and family. SW inquired about when Patient's daughter would be at bedside. Patient's daughter stated she would be at bedside about 9:15-9:30. SW stated she would get information together and meet with Patient and daughter at bedside.    SW messaged Attending with care update. Attending stated he would meet with Patient and family at bedside.    SW will continue to follow and assist as needed.

## 2024-01-18 NOTE — OP NOTE
H/o Stage 4 CKD (HCC)    Vas Cath for hemodialysis access placed using 13 Fr catheter.  Patient tolerated well with no immediate complications.  Catheter ready to use.

## 2024-01-18 NOTE — PROGRESS NOTES
ST swallowing evaluation orders received and chart reviewed.  Pt in procedure with IR and re-attempting dialysis again today.   ST will return for assessment as pt available.

## 2024-01-19 NOTE — PT/OT/SLP PROGRESS
Physical Therapy      Patient Name:  Moody Silverio   MRN:  [id number]    10:05 a.m.  Patient not seen today secondary to Nurse/ JOHN hold due to unstable SpO2/ respiratory distress. Now on BiPap. Will follow-up at next available opportunity.

## 2024-01-19 NOTE — PLAN OF CARE
Updated patient on plan of care. Currently receiving Hdtx and 1 unit of PRBC. Turned q2h, heel boots in place, bed alarm on. Urethral catheter removed, external catheter placed. Bilat foot wound dressings changed. Instructed patient to use call light for assistance, call light in reach. Hourly rounding performed. Vitals q4 hours. Education provided, questions answered/encouraged. Chart check complete. NSR.      Problem: Adult Inpatient Plan of Care  Goal: Plan of Care Review  Outcome: Ongoing, Progressing

## 2024-01-19 NOTE — PROGRESS NOTES
01/19/24 1601   Treatment Type   Treatment Type Acute   Vital Signs   Temp 98.5 °F (36.9 °C)   Temp Source Axillary   Pulse 79   Heart Rate Source Monitor   Resp (!) 26   SpO2 95 %   Pulse Oximetry Type Continuous   Probe Placed On (Pulse Ox) Right:;finger   Oximetry Probe Status Intact   Oxygen Concentration (%) 80   Device (Oxygen Therapy) BIPAP   BP (!) 143/80   MAP (mmHg) 104   BP Location Left arm   BP Method Automatic   Patient Position Lying        Hemodialysis Catheter 01/18/24 1226 right internal jugular   Placement Date/Time: 01/18/24 1226   Hand Hygiene: Performed  Barrier Precautions: Performed  Skin Antisepsis: chlorhexidine (without alcohol)  Hemodialysis Catheter Type: Temporary catheter  Location: right internal jugular  Catheter Size (Fr): 13 Fr...   Line Necessity Review CRRT/HD   Verification by X-ray Other (Comment)   Site Assessment No drainage;No redness;No swelling;No warmth   Line Securement Device Secured with sutures   Dressing Type CHG impregnated dressing/sponge;Central line dressing   Dressing Status Clean;Dry;Intact   Dressing Intervention Integrity maintained   Date on Dressing 01/18/24   Dressing Due to be Changed 01/25/24   Venous Patency/Care accessed;flushed w/o difficulty;heparin locked;blood return present   Arterial Patency/Care accessed;flushed w/o difficulty;heparin locked;blood return present   Waveform Not being transduced        Hemodialysis AV Fistula Right forearm   No placement date or time found.   Present Prior to Hospital Arrival?: Yes  Location: Right forearm   Needle Size   (NOT IN USE)   Site Assessment Other (Comment)  (NOT IN USE)   Patency Other (Comment)   Status   (NOT IN USE)   Flows Other (Comment)   Dressing Intervention   (NOT IN USE)   Dressing Status Clean;Dry;Intact   Site Condition Other (Comment)  (NOT IN USE)   Dressing Open to air (None)   Drainage Description Other (Comment)  (NO DRAINAGE NOTED)     NEW START DAY 2 INITIATED and in progress as  ordered for Dr Tadeo; new start day 2. Will also plan to transfuse x1 unit of PRBC's with dialysis . Also noted with Left UE AVF, currently not in use, but noted with palpable thrill and + bruits were auscultated; however access did seem quite flat.

## 2024-01-19 NOTE — ASSESSMENT & PLAN NOTE
Does not wear oxygen at home.   Denies sputum production   Multi-factorial with volume overload and possible pneumonia   Diuresis as tolerated; underwent HD yesterday with plans for repeat HD today  RIP negative  Levaquin - 5 day course should be sufficient   Blood cultures NGTD  Supplemental oxygen as needed to maintain sats 88% or greater  Increased work of breathing with wheezing and rhonchi noted this am; give 1x dose of Morphine, nebs given, and add BiPAP for now

## 2024-01-19 NOTE — PROGRESS NOTES
[facility] - Telemetry ([facility])  Adult Nutrition  Progress Note    SUMMARY       Recommendations    Recommendation/Intervention:   1. If pt unable to consume > 25% PO intake x 3 days, recommend re-consult for alternative means of nutrition   2. When medically approrpiate, recommend Diabetic 2000 calorie, Cardiac, Renal, 1500 mL fluid restriction diet, Texture per SLP recommendations   3. Recommend pt continues Novasource renal TID when medically appropriate   4. Recommend bowel regimen (No BM x 9 days)   5. Weight s/p dialysis    Goals:   1. Pt's diet will be modified and to safest diet texture prior to RD follow up   2. If warranted RD consulted by day 3 PO intake 25% or <   3. Pt will tolerate and intake > 75% EEN and EPN by RD follow up   4. Pt will have BM prior to RD follow up  Nutrition Goal Status: new  Communication of RD Recs: other (comment) (POC, sticky note)    Assessment and Plan    Nutrition Problem  Inadequate protein-energy intake   Increased nutrient needs   Altered GI function     Related to (etiology):   Decreased ability to consume sufficient pro/energy   ESRD/ ELMIRA  Alteration in GI tract structure and/ or function     Signs and Symptoms (as evidenced by):   BiPAP, Estimated intake of food less than estimated needs   Hemodialysis  Constipation (x 9 days no BM)     Interventions/Recommendations (treatment strategy):  1. Decreased carbohydrate, sodium, fat, and mineral, fluid restriction, possibly texture modified diet  2.  Commercial beverage   3. Prescription medication   4. Collaboration with other providers     Nutrition Diagnosis Status:   New       Malnutrition Assessment     Skin (Micronutrient): wounds unhealed, edema (Jurgen score = 13 (moderate risk)       Energy Intake (Malnutrition): less than 75% for greater than 7 days  Fluid Accumulation (Malnutrition): moderate                         Reason for Assessment    Reason For Assessment: length of stay  Diagnosis:  (Acute kidney injury  "superimposed on CKD)  Relevant Medical History: HTN, Anemia in CKD, T2DM, Benign prostatic hyperplasia w/ urinary obstruction, CHF, Acute hypoxic respiratory failure, CKD 5, Debility, ESRD  Hx: Stroke  General Information Comments:   1/19/24: 53 y.o. Male admitted for Acute kidney injury superimposed on CKD. Pt currently on Diabetic 2000 calorie, Renal, 1500 mL fluid restriction diet. H&P noted that the pt presented to [facility] on 1-8[facility]24 with c/o SOB due to volume overload/CHF exacerbation, pneumonia and acute on CKD, pt remained at outside hospital ER till transfer to [facility] on 1-10-24, pt reported continued SOB, constipation, abdominal distention, fatigue. Per Hospital Medicine Physician note 1/19 "Patient with respiratory distress this a.m. maxed out on Vapotherm.  He was placed on BiPAP.  Nephrology on case who plans to dialyze again today.  Extra 100 mg of Lasix given". SLP note on 1/19 that the pt is not safe for PO intake at this time d/t increased O2 need requiring BiPAP. Attempted to visit pt yesterday, pt was out of the room at dialysis.  Reviewed chart: 0-25% PO intake of meals x 7 days; LBM 1/10 (x 9 days no BM); Altered skin: bilateral plantar ulceration blistered, clean/dry/intact, details per Wound care note 1/18; Jurgen score: 13 (moderate risk); Edema: 2+ mild bilateral leg, 3+ moderate bilateral hand/arm. Labs, meds, weight reviewed. Weight charted 1/8 270 lbs, 1/17 281 lbs, +11 lb wt gain x 9 days, re weight for accuracy warranted. No NFPE warranted at this time, BMI > 30, will perform at follow up if warranted. RD will continue to follow and monitor pt's nutritional status during admit.    Nutrition Discharge Planning: Diabetic 2000 calorie, Cardiac, Renal, 1500 mL fluid restriction diet + Novasource renal as warranted within fluid restriction    Nutrition Risk Screen    Nutrition Risk Screen: difficulty chewing/swallowing    Nutrition/Diet History    Spiritual, " "Cultural Beliefs, Latter-day Practices, Values that Affect Care: no  Food Allergies: NKFA  Factors Affecting Nutritional Intake: chewing difficulties/inability to chew food, difficulty/impaired swallowing, decreased appetite, constipation    Anthropometrics    Temp: 97.8 °F (36.6 °C)  Height Method: Stated  Height: 6' 1" (185.4 cm)  Height (inches): 73 in  Weight Method: Bed Scale  Weight: 127.6 kg (281 lb 4.9 oz)  Weight (lb): 281.31 lb  Ideal Body Weight (IBW), Male: 184 lb  % Ideal Body Weight, Male (lb): 152.89 %  BMI (Calculated): 37.1  BMI Grade: 35 - 39.9 - obesity - grade II     Wt Readings from Last 15 Encounters:   01/19/24 127.6 kg (281 lb 4.9 oz)   01/08/24 122.5 kg (270 lb)     Lab/Procedures/Meds    Pertinent Labs Reviewed: reviewed  Pertinent Labs Comments: Calcium (L), Na (L), Albumin (L), Glu (H), BUN (H), Cr (H), Phos (H), eGFR 7  Pertinent Medications Reviewed: reviewed  Pertinent Medications Comments: tamsulosin sodium bicarbonate, metolazone, isosorbide mononitrate, Levemir, hydralazine, gabapentin, fluticasone propionate, enoxaparin, duloxetune, clopidogrel, carvedilol, bumetanide, atorvastatin, amlodipine  BMP  Lab Results   Component Value Date     (L) 01/19/2024    K 4.2 01/19/2024    CL 95 01/19/2024    CO2 22 (L) 01/19/2024     (H) 01/19/2024    CREATININE 8.0 (H) 01/19/2024    CALCIUM 7.8 (L) 01/19/2024    ANIONGAP 14 01/19/2024    EGFRNORACEVR 7 (A) 01/19/2024     Lab Results   Component Value Date    ALBUMIN 1.8 (L) 01/19/2024     Lab Results   Component Value Date    CALCIUM 7.8 (L) 01/19/2024    PHOS 6.1 (H) 01/19/2024     Recent Labs   Lab 01/19/24  1212   POCTGLUCOSE 255*     Lab Results   Component Value Date    HGBA1C 6.2 (H) 01/10/2024     Lab Results   Component Value Date    WBC 14.14 (H) 01/19/2024    HGB 6.5 (L) 01/19/2024    HCT 19.7 (LL) 01/19/2024    MCV 87 01/19/2024     01/19/2024       Scheduled Meds:   amLODIPine  10 mg Oral Daily    atorvastatin  40 " mg Oral Nightly    bumetanide  2 mg Intravenous BID    carvediloL  25 mg Oral BID WM    clopidogreL  75 mg Oral Daily    DULoxetine  60 mg Oral Daily    enoxparin  30 mg Subcutaneous Q24H (prophylaxis, 1700)    epoetin corry (PROCRIT) injection  7,000 Units Subcutaneous Every Tues, Thurs, Sat    fluticasone propionate  2 spray Each Nostril Daily    gabapentin  300 mg Oral BID    hydrALAZINE  100 mg Oral TID    insulin detemir U-100  14 Units Subcutaneous Daily    iron sucrose  200 mg Intravenous Daily    isosorbide mononitrate  60 mg Oral Daily    mannitol 25%  25 g Intravenous Once    sevelamer carbonate  1,600 mg Oral TID WM    tamsulosin  1 capsule Oral Daily     Continuous Infusions:  PRN Meds:.0.9%  NaCl infusion (for blood administration), albuterol-ipratropium, dextrose 10%, dextrose 10%, dextrose 10%, dextrose 10%, glucagon (human recombinant), glucagon (human recombinant), glucose, glucose, glucose, glucose, hydrALAZINE, insulin aspart U-100, naloxone, polyethylene glycol, sodium chloride 0.9%, sodium chloride 0.9%, traZODone    Physical Findings/Assessment         Estimated/Assessed Needs    Weight Used For Calorie Calculations: 127.6 kg (281 lb 4.9 oz)  Energy Calorie Requirements (kcal): 2165 kcals (MSJ no AF (Obese, BMI 37.11 vs ELMIRA vs CHF vs Diabetes vs ESRD on HD)  Energy Need Method: [facility]  Protein Requirements: 127-191 g (1.0-1.5 g/kg ABW (ELMIRA w/ dialysis vs ESRD on HD, diabetic vs CHF)  Weight Used For Protein Calculations: 127.6 kg (281 lb 4.9 oz)  Fluid Requirements (mL): 1500 mL fluid restriction (CHF vs ESRD on HD (per MD/NP)  Estimated Fluid Requirement Method: other (see comments)  RDA Method (mL): 2165  CHO Requirement: 270 g (2165 kcals/8)      Nutrition Prescription Ordered    Current Diet Order: Diabetic 2000 calorie, Renal, 1500 mL fluid restiction diet  Oral Nutrition Supplement: Novasource renal TID    Evaluation of Received Nutrient/Fluid Intake  I/O: (Net since admit)    1/19: -9738.6 mL    Energy Calories Required: not meeting needs  Protein Required: not meeting needs  Fluid Required: not meeting needs  Total Fluid Intake (mL): 700  Total Fluid Output (mL): 1710  Tolerance: tolerating  % Intake of Estimated Energy Needs: 0 - 25 %  % Meal Intake: 0 - 25 %    Nutrition Risk    Level of Risk/Frequency of Follow-up: high (F/u x 2 weekly)     Monitor and Evaluation    Food and Nutrient Intake: energy intake, food and beverage intake  Food and Nutrient Adminstration: diet order  Knowledge/Beliefs/Attitudes: food and nutrition knowledge/skill, beliefs and attitudes  Anthropometric Measurements: weight, weight change, body mass index  Biochemical Data, Medical Tests and Procedures: electrolyte and renal panel, gastrointestinal profile, glucose/endocrine profile, inflammatory profile     Nutrition Follow-Up    RD Follow-up?: Yes  Tamara Roy, Registration Eligible, Provisional LDN

## 2024-01-19 NOTE — ASSESSMENT & PLAN NOTE
Patient is identified as having  unknown, TTE pending  heart failure that is Acute. CHF is currently uncontrolled due to Rales/crackles on pulmonary exam and Pulmonary edema/pleural effusion on CXR. Latest ECHO performed and demonstrates- No results found for this or any previous visit.  .  Monitor strict Is&Os and daily weights.    Place on fluid restriction of 1.5 L.   Last BNP reviewed- and noted below   Recent Labs   Lab 01/15/24  0819   *       TTE pending  Consult Cardiology    1/19:   Cardiology on case   Echo reviewed   Normal EF   Likely diastolic heart failure exacerbation  Extra dose of 100 mg of Lasix given  Dialysis planned for today

## 2024-01-19 NOTE — PLAN OF CARE
[facility] - Telemetry (Hospital)  Discharge Reassessment    Primary Care Provider: Jersey Nolan MD    Expected Discharge Date:     Reassessment (most recent)       Discharge Reassessment - 01/19/24 1136          Discharge Reassessment    Assessment Type Discharge Planning Reassessment     Did the patient's condition or plan change since previous assessment? Yes     Communicated ALONDRA with patient/caregiver Date not available/Unable to determine     Discharge Plan A Skilled Nursing Facility                     Anticipated DC dispo: SNF   Progress towards plan: patient now on Vapotherm, HD session #2 today. Pending O2 weaning for SNF. Awaiting decision from [facility] per patient preference.

## 2024-01-19 NOTE — ASSESSMENT & PLAN NOTE
Patient with acute kidney injury/acute renal failure likely due to post-obstructive d/t bladder outlet obstruction  ELMIRA is currently worsening. Baseline creatinine unknown - Labs reviewed- Renal function/electrolytes with Estimated Creatinine Clearance: 14.6 mL/min (A) (based on SCr of 8 mg/dL (H)). according to latest data. Monitor urine output and serial BMP and adjust therapy as needed. Avoid nephrotoxins and renally dose meds for GFR listed above.    Hold further IV diuretics for now  Consult Nephrology  Strict I/O's, avoid nephrotoxins  May require HD, has right AVF since Sept '23.    1/18:   Creatinine continues to worsen   Patient continues to be volume overloaded despite metolazone and Bumex   Discussion was held on initiating dialysis   Patient agreeable   IR consulted for Vas-Cath placement   Right AV fistula possibly not matured as of yet  Vas cath was placed 1st session of dialysis yesterday   Second session planned for today per Nephrology

## 2024-01-19 NOTE — PLAN OF CARE
New Start day 2 in progress, 3 hr hdtx. Will plan for a NET UF = 3L as tolerated. Remains on Bipap. FIO2 remains @ 80% currently. Will continue to monitor for changes and trends.

## 2024-01-19 NOTE — SUBJECTIVE & OBJECTIVE
Interval History:  Patient placed on BiPAP today.  Plans in place to dialyzed again.    Review of Systems   Unable to perform ROS: Acuity of condition     Objective:     Vital Signs (Most Recent):  Temp: 97.8 °F (36.6 °C) (01/19/24 1134)  Pulse: 82 (01/19/24 1134)  Resp: (!) 23 (01/19/24 1134)  BP: 138/63 (01/19/24 1134)  SpO2: 98 % (01/19/24 1134) Vital Signs (24h Range):  Temp:  [97.6 °F (36.4 °C)-99.6 °F (37.6 °C)] 97.8 °F (36.6 °C)  Pulse:  [78-99] 82  Resp:  [16-32] 23  SpO2:  [80 %-98 %] 98 %  BP: (138-177)/(63-85) 138/63     Weight: 127.6 kg (281 lb 4.9 oz)  Body mass index is 37.11 kg/m².    Intake/Output Summary (Last 24 hours) at 1/19/2024 1144  Last data filed at 1/19/2024 0830  Gross per 24 hour   Intake 820 ml   Output 1650 ml   Net -830 ml         Physical Exam  Constitutional:       General: He is not in acute distress.     Appearance: He is well-developed. He is obese. He is not ill-appearing or diaphoretic.   HENT:      Head: Normocephalic and atraumatic.   Eyes:      Pupils: Pupils are equal, round, and reactive to light.   Cardiovascular:      Rate and Rhythm: Normal rate and regular rhythm.      Heart sounds: Normal heart sounds. No murmur heard.     No friction rub. No gallop.   Pulmonary:      Effort: No respiratory distress.      Breath sounds: No stridor. Wheezing and rales present.   Abdominal:      General: Bowel sounds are normal. There is no distension.      Palpations: Abdomen is soft. There is no mass.      Tenderness: There is no abdominal tenderness. There is no guarding.   Musculoskeletal:      Right lower leg: Edema present.      Left lower leg: Edema present.   Skin:     General: Skin is warm.      Findings: No erythema.   Neurological:      General: No focal deficit present.             Significant Labs: All pertinent labs within the past 24 hours have been reviewed.    Significant Imaging: I have reviewed all pertinent imaging results/findings within the past 24 hours.

## 2024-01-19 NOTE — PROGRESS NOTES
01/19/24 1540   Required for all Hemodialysis Patients   Hepatitis Status other (see comments)   Handoff Report   Received From Joy Wallace RN   Given To David Reyes RN   Treatment Type   Treatment Type Acute   Vital Signs   Temp 98.5 °F (36.9 °C)   Temp Source Axillary   Pulse 79   Heart Rate Source Monitor   Resp (!) 26   SpO2 95 %   Pulse Oximetry Type Continuous   Probe Placed On (Pulse Ox) finger;Right:   Oximetry Probe Status Intact   Oxygen Concentration (%) 80   Device (Oxygen Therapy) BIPAP   BP (!) 151/81   MAP (mmHg) 108   BP Location Left arm   BP Method Automatic   Patient Position Lying        01/19/24 1540   Required for all Hemodialysis Patients   Hepatitis Status other (see comments)   Handoff Report   Received From Joy Wallace RN   Given To David Reyes RN   Treatment Type   Treatment Type Acute   Vital Signs   Temp 98.5 °F (36.9 °C)   Temp Source Axillary   Pulse 79   Heart Rate Source Monitor   Resp (!) 26   SpO2 95 %   Pulse Oximetry Type Continuous   Probe Placed On (Pulse Ox) finger;Right:   Oximetry Probe Status Intact   Oxygen Concentration (%) 80   Device (Oxygen Therapy) BIPAP   BP (!) 151/81   MAP (mmHg) 108   BP Location Left arm   BP Method Automatic   Patient Position Lying     Arrived at bs, will begin setup for bedside tx as ordered for Dr Tadeo. Today is his 2nd dialysis tx. ;

## 2024-01-19 NOTE — PROGRESS NOTES
ST attempted bedside swallow assessment this a.m.; however, pt removed from vapotherm and placed on BiPAP s/t worsening respiratory distress.  RT and Pulm NP at bedside.  Pt not safe for po intake at this time s/t increased O2 need.  ST will return in a.m. for assessment if clinically indicated.

## 2024-01-19 NOTE — PT/OT/SLP PROGRESS
Occupational Therapy      Patient Name:  Moody Silverio   MRN:  [id number]    Patient not seen today secondary to hold per nurse; pt currently in respiratory distress and placed on BiPAP. Will follow-up at next available opportunity.    1/19/2024  Selina Quinonez, OT   1001

## 2024-01-19 NOTE — PROGRESS NOTES
"[facility] Medicine  Progress Note    Patient Name: Moody Silverio  MRN: [id number]  Patient Class: IP- Inpatient   Admission Date: 1/10/2024  Length of Stay: 9 days  Attending Physician: Abhijeet Kennedy MD  Primary Care Provider: Jersey Nolan MD        Subjective:     Principal Problem:Acute kidney injury superimposed on CKD        HPI:  Moody Silverio is 54 y/o male with PMHx IDDM, HTN, CKD, Hx of CVA with right hemiparesis, anemia of chronic disease, BPH, chronic back pain, anxiety who presented to [facility] on 1-8-24 with c/o SOB due to volume overload/CHF exacerbation and pneumonia and also acute on CKD. History obtained from records sent from outside hospital, patient, and family at bedside. Patient remained at outside hospital ER till transfer to [facility] today (1-10-24). Patient arrived to [facility] with Valera in place and on supplemental O2. Patient with right forearm AV shunt. Patient reports continued SOB, constipation, abdominal distention, fatigue.    Outside hospital ER labs/imaging reviewed:  WBC 11  BUN/Cr 77/4.8 (1/8/24); Cr 4.55    H/H 8.6/27.4  CXR with bilateral upper and lower lobe patchy hazy pulmonary opacities either edema or pneumonia (1/9/24)  CT Chest "bilateral pneumonia with an underlying component of volume overload." (1/8/24)    ER course reviewed: patient was initially placed on BiPAP, transitioned to NC (2-3 L), and also required Valera cathter placement due to urinary outlet obstruction. He was treated with pneumonia and CHF exacerbation, received ceftriaxone, azithromycin, doxycycline, DuoNebs, Bumex IV, Lasix IV. Also noted he developed reaction to ceftriaxone, listed as allergy. [facility], accepted as inpatient to [facility] on 1-8-24.    Overview/Hospital Course:  1/11:  Nephrology and cardiology on case.  Recommend Bumex 2 mg q.8h.  Will continue p.o. doxycycline.  " Wean O2 as tolerated.  Echo showed normal EF.  Likely diastolic heart failure exacerbation.  1/12:  Patient requiring high-flow Vapotherm today, will repeat stat chest x-ray.  Continue with diuresis.  Anemia noted, likely to CKD.  Will check iron studies.  Consider EPO and iron replacement.  1/13:  Patient currently on 30 L 60% FiO2.  Continue with diuresis.  Start IV Levaquin for pneumonia.  Anemia again noted, will transfuse 1 unit packed RBCs.  Likely secondary to CKD and iron deficiency.  Start iron replacement therapy.  EPO given.  1/14:  Patient on 30 L 85% FiO2.  Continue diuresis.  Continue Levaquin.  H&H improved status post 1 unit packed RBC.  Continue iron replacement therapy.  1/15: weaned down to 30L 60% fio2. Cont levaquin for pna. H/h trending down, no active signs of bleeding noted. Cont to monitor. Cont on iv bumex and metolazone for diuresis. Pt/OT eval.   1/16:.  Patient required BiPAP overnight however only wore it for a few hours.  Currently on 36 L 90% Vapotherm.  Pulmonology on case.  Continue diuresis.  Nephrology on case.  Repeat chest x-ray today.  Continue Levaquin.  PT/OT recommending SNF placement.  1/17:.  Patient continues to be volume overloaded.  Creatinine trending up.  Nephrology to start dialysis.  Patient with right AV fistula in forearm.  Continue empiric treatment for pneumonia.  1/18:  Dialysis attempted yesterday however right AV fistula did not function properly.  Likely requires more time for maturation.  Family discussion held as patient voiced not wanting to initiate dialysis and just wanting to go home.  Discussion was held with myself, nephrologist, patient, and 2 daughters.  Discussed that patient may likely improve with trial of dialysis and also with the expected course will be should he decide to forego dialysis.  Hospice was also discussed.  Patient agreeable to initiate dialysis.  IR consulted for Vas-Cath placement.  1/19: Patient with respiratory distress this  a.m. maxed out on Vapotherm.  He was placed on BiPAP.  Nephrology on case who plans to dialyze again today.  Extra 100 mg of Lasix given.    Interval History:  Patient placed on BiPAP today.  Plans in place to dialyzed again.    Review of Systems   Unable to perform ROS: Acuity of condition     Objective:     Vital Signs (Most Recent):  Temp: 97.8 °F (36.6 °C) (01/19/24 1134)  Pulse: 82 (01/19/24 1134)  Resp: (!) 23 (01/19/24 1134)  BP: 138/63 (01/19/24 1134)  SpO2: 98 % (01/19/24 1134) Vital Signs (24h Range):  Temp:  [97.6 °F (36.4 °C)-99.6 °F (37.6 °C)] 97.8 °F (36.6 °C)  Pulse:  [78-99] 82  Resp:  [16-32] 23  SpO2:  [80 %-98 %] 98 %  BP: (138-177)/(63-85) 138/63     Weight: 127.6 kg (281 lb 4.9 oz)  Body mass index is 37.11 kg/m².    Intake/Output Summary (Last 24 hours) at 1/19/2024 1144  Last data filed at 1/19/2024 0830  Gross per 24 hour   Intake 820 ml   Output 1650 ml   Net -830 ml         Physical Exam  Constitutional:       General: He is not in acute distress.     Appearance: He is well-developed. He is obese. He is not ill-appearing or diaphoretic.   HENT:      Head: Normocephalic and atraumatic.   Eyes:      Pupils: Pupils are equal, round, and reactive to light.   Cardiovascular:      Rate and Rhythm: Normal rate and regular rhythm.      Heart sounds: Normal heart sounds. No murmur heard.     No friction rub. No gallop.   Pulmonary:      Effort: No respiratory distress.      Breath sounds: No stridor. Wheezing and rales present.   Abdominal:      General: Bowel sounds are normal. There is no distension.      Palpations: Abdomen is soft. There is no mass.      Tenderness: There is no abdominal tenderness. There is no guarding.   Musculoskeletal:      Right lower leg: Edema present.      Left lower leg: Edema present.   Skin:     General: Skin is warm.      Findings: No erythema.   Neurological:      General: No focal deficit present.             Significant Labs: All pertinent labs within the past 24  hours have been reviewed.    Significant Imaging: I have reviewed all pertinent imaging results/findings within the past 24 hours.    Assessment/Plan:      * Acute kidney injury superimposed on CKD  Patient with acute kidney injury/acute renal failure likely due to post-obstructive d/t bladder outlet obstruction  ELMIRA is currently worsening. Baseline creatinine unknown - Labs reviewed- Renal function/electrolytes with Estimated Creatinine Clearance: 14.6 mL/min (A) (based on SCr of 8 mg/dL (H)). according to latest data. Monitor urine output and serial BMP and adjust therapy as needed. Avoid nephrotoxins and renally dose meds for GFR listed above.    Hold further IV diuretics for now  Consult Nephrology  Strict I/O's, avoid nephrotoxins  May require HD, has right AVF since Sept '23.    1/18:   Creatinine continues to worsen   Patient continues to be volume overloaded despite metolazone and Bumex   Discussion was held on initiating dialysis   Patient agreeable   IR consulted for Vas-Cath placement   Right AV fistula possibly not matured as of yet  Vas cath was placed 1st session of dialysis yesterday   Second session planned for today per Nephrology      Debility  PT OT recommending SNF  Patient agreeable to SNF    Acute hypoxic respiratory failure  Patient with Hypoxic Respiratory failure which is Acute.  he is not on home oxygen. Supplemental oxygen was provided and noted- Oxygen Concentration (%):  [] 100    .   Signs/symptoms of respiratory failure include- tachypnea, increased work of breathing, respiratory distress, and use of accessory muscles. Contributing diagnoses includes - CHF Labs and images were reviewed. Patient Has not had a recent ABG. Will treat underlying causes and adjust management of respiratory failure as follows-       Patient completed Levaquin course   Likely cause of respiratory distress is fluid overload   Dialysis today   Patient placed on BiPAP   Will repeat chest x-ray    CHF  (congestive heart failure)  Patient is identified as having  unknown, TTE pending  heart failure that is Acute. CHF is currently uncontrolled due to Rales/crackles on pulmonary exam and Pulmonary edema/pleural effusion on CXR. Latest ECHO performed and demonstrates- No results found for this or any previous visit.  .  Monitor strict Is&Os and daily weights.    Place on fluid restriction of 1.5 L.   Last BNP reviewed- and noted below   Recent Labs   Lab 01/15/24  0819   *       TTE pending  Consult Cardiology    1/19:   Cardiology on case   Echo reviewed   Normal EF   Likely diastolic heart failure exacerbation  Extra dose of 100 mg of Lasix given  Dialysis planned for today    Benign prostatic hyperplasia with urinary obstruction  Valera in place on arrival, [facility]  CT C/A/P pending  Consider Urology consult pending CT results  Strict I/O's      Type 2 diabetes mellitus  Patient's FSGs are uncontrolled due to hyperglycemia on current medication regimen.  Last A1c reviewed-   Lab Results   Component Value Date    HGBA1C 6.2 (H) 01/10/2024     Most recent fingerstick glucose reviewed-   Recent Labs   Lab 01/15/24  1621 01/15/24  2019 01/16/24  0633   POCTGLUCOSE 184* 188* 161*       Current correctional scale  Medium  Maintain anti-hyperglycemic dose as follows-   Antihyperglycemics (From admission, onward)      Start     Stop Route Frequency Ordered    01/13/24 0900  insulin detemir U-100 (Levemir) pen 14 Units         -- SubQ Daily 01/13/24 0726    01/10/24 1728  insulin aspart U-100 pen 0-10 Units         -- SubQ Before meals & nightly PRN 01/10/24 1629          Hold Oral hypoglycemics while patient is in the hospital.      Glucose controlled  Will continue Levemir 14 units   Continue sliding scale    Anemia in chronic kidney disease (CKD)  H/h trending down  No active signs of bleeding  Cont to monitor  Continue p.o. iron supplementation  Nephrology added Venofer   He has received 1 dose EPO  this stay    Essential hypertension  Chronic, controlled. Latest blood pressure and vitals reviewed-     Temp:  [98.2 °F (36.8 °C)]   Pulse:  [85-92]   Resp:  [18-22]   BP: (174-180)/(73-75)   SpO2:  [95 %-96 %] .   Home meds for hypertension were reviewed and noted below.   Hypertension Medications               amLODIPine (NORVASC) 10 MG tablet Take 10 mg by mouth once daily.    carvediloL (COREG) 25 MG tablet Take 25 mg by mouth 2 (two) times daily.    furosemide (LASIX) 80 MG tablet Take 80 mg by mouth 2 (two) times daily.    hydrALAZINE (APRESOLINE) 50 MG tablet Take 50 mg by mouth 3 (three) times daily.            While in the hospital, will manage blood pressure as follows; Continue home antihypertensive regimen    Will utilize p.r.n. blood pressure medication only if patient's blood pressure greater than 180/110 and he develops symptoms such as worsening chest pain or shortness of breath.        Critical care time spent on the evaluation and treatment of severe organ dysfunction, review of pertinent labs and imaging studies, discussions with consulting providers and discussions with patient/family: 37 minutes.    VTE Risk Mitigation (From admission, onward)           Ordered     enoxaparin injection 30 mg  Every 24 hours         01/14/24 0958     IP VTE LOW RISK PATIENT  Once         01/10/24 1557     Place sequential compression device  Until discontinued         01/10/24 1557                    Discharge Planning   ALONDRA:      Code Status: DNR   Is the patient medically ready for discharge?:     Reason for patient still in hospital (select all that apply): Patient unstable  Discharge Plan A: Skilled Nursing Facility   Discharge Delays: None known at this time              Abhijeet Kennedy MD  Department of Hospital Medicine   [facility] - Telemetry ([facility])

## 2024-01-19 NOTE — PROGRESS NOTES
[facility]  Pulmonology Progress Note    Patient Name: Moody Silverio   MRN: [id number]  Admission Date: 1/10/2024   Hospital Length of Stay: 9 days  Code Status: DNR     Attending Provider: Abhijeet Kennedy MD  Principal Problem: Acute kidney injury superimposed on CKD  Subjective:   History of present illness:  Moody Silverio is a 53-year-old male with a known past medical history of chronic kidney disease stage IV, hypertension, BPH, CHF, anemia, prior CVA, blindness, diabetes mellitus, and foot wounds who was admitted by [facility] medicine 1/8/2024 for treatment of volume overload, CHF exacerbation and pneumonia. He initially presented to [facility] in [place], [place] and was treated with BiPAP, IV bumex, antibiotics, and nebulizer treatments. Transfer was requested for Nephrology services who were consulted and have been following since admission. Pulmonary team consulted for evaluation and additional recommendations for ongoing hypoxia requiring vapotherm despite IV diuretics and antibiotics.     Upon exam, Mr. Uriostegui is lying in bed and appears to be breathing comfortably with vapotherm in place. He denies any subjective complaints and reports feeling better. Tmax the past 24 hours 100.8. Sputum culture ordered 1/11 but patient is not producing any for sampling. Patient reports no BM in a few days with soft but distended abdomen. Denies history of known TANA or prior recommendation or or use of CPAP.     Hospital Course/Interval History:   1/16: continues to deny any pulmonary symptoms despite stable, maybe even a little progression of bilateral opacities on CXR. Increased Vapotherm requirements today. Renal has offered to initiate HD at this point, patient was given time to make a decision and tells me that he is ready to start. WBC normal, no fevers, procal down trending   1/19: patient with increased work of breathing today; remains on Vapotherm increased to 40/1.00 this morning.  Wheezing noted throughout. Underwent HD yesterday with plans for repeat HD today.    Objective:     Vital Signs (Most Recent):  Temp: 98.7 °F (37.1 °C) (01/19/24 0814)  Pulse: 99 (01/19/24 0954)  Resp: (!) 32 (01/19/24 1016)  BP: (!) 159/71 (01/19/24 0720)  SpO2: (!) 92 % (01/19/24 0954) Vital Signs (24h Range):  Temp:  [97.6 °F (36.4 °C)-99.6 °F (37.6 °C)] 98.7 °F (37.1 °C)  Pulse:  [78-99] 99  Resp:  [16-32] 32  SpO2:  [80 %-94 %] 92 %  BP: (141-177)/(63-85) 159/71   Weight: 127.6 kg (281 lb 4.9 oz);  Body mass index is 37.11 kg/m².    Intake/Output Summary (Last 24 hours) at 1/19/2024 1040  Last data filed at 1/19/2024 0830  Gross per 24 hour   Intake 820 ml   Output 1650 ml   Net -830 ml      Physical Exam  Vitals and nursing note reviewed.   HENT:      Head: Normocephalic.   Cardiovascular:      Rate and Rhythm: Tachycardia present.   Pulmonary:      Breath sounds: Wheezing and rhonchi present.   Abdominal:      Palpations: Abdomen is soft.   Musculoskeletal:         General: Normal range of motion.   Skin:     General: Skin is warm and dry.   Neurological:      Mental Status: He is alert. Mental status is at baseline.   Psychiatric:         Mood and Affect: Mood normal.         Review of Systems: Negative except as indicated in HPI    Significant Labs:  CBC/Anemia Profile:  Recent Labs   Lab 01/18/24  0603 01/19/24  0452   WBC 15.41* 14.14*   HGB 7.2* 6.5*   HCT 21.5* 19.7*    364   MCV 88 87   RDW 14.6* 14.6*   Chemistries:  Recent Labs   Lab 01/18/24  0603 01/19/24  0452   * 131*   K 4.5 4.2   CL 96 95   CO2 17* 22*   * 110*   CREATININE 10.6* 8.0*   CALCIUM 6.9* 7.8*   ALBUMIN 1.8* 1.8*   PHOS 8.4* 6.1*     ABG  Recent Labs   Lab 01/12/24  1602   PH 7.383   PO2 60*   PCO2 33.6*   HCO3 20.0*   BE -5*     Assessment/Plan:   Acute hypoxic respiratory failure  Does not wear oxygen at home.   Denies sputum production   Multi-factorial with volume overload and possible pneumonia   Diuresis as  tolerated; underwent HD yesterday with plans for repeat HD today  RIP negative  Levaquin - 5 day course should be sufficient   Blood cultures NGTD  Supplemental oxygen as needed to maintain sats 88% or greater  Increased work of breathing with wheezing and rhonchi noted this am; give 1x dose of Morphine, nebs given, and add BiPAP for now  Patient is a DNR     Arina Perez NP  Pulmonary and Critical Care  [facility]

## 2024-01-19 NOTE — SUBJECTIVE & OBJECTIVE
Moody Silverio is a 53-year-old male with a known past medical history of chronic kidney disease stage IV, hypertension, BPH, CHF, anemia, prior CVA, blindness, diabetes mellitus, and foot wounds who was admitted by [facility] medicine 1/8/2024 for treatment of volume overload, CHF exacerbation and pneumonia. He initially presented to [facility] in [place], [place] and was treated with BiPAP, IV bumex, antibiotics, and nebulizer treatments. Transfer was requested for Nephrology services who were consulted and have been following since admission. Pulmonary team consulted for evaluation and additional recommendations for ongoing hypoxia requiring vapotherm despite IV diuretics and antibiotics.     Upon exam, Mr. Uriostegui is lying in bed and appears to be breathing comfortably with vapotherm in place. He denies any subjective complaints and reports feeling better. Tmax the past 24 hours 100.8. Sputum culture ordered 1/11 but patient is not producing any for sampling. Patient reports no BM in a few days with soft but distended abdomen. Denies history of known TANA or prior recommendation or or use of CPAP.     Hospital Course/Interval History:   1/16: continues to deny any pulmonary symptoms despite stable, maybe even a little progression of bilateral opacities on CXR. Increased Vapotherm requirements today. Renal has offered to initiate HD at this point, patient was given time to make a decision and tells me that he is ready to start. WBC normal, no fevers, procal down trending   1/19: patient with increased work of breathing today; remains on Vapotherm increased to 40/1.00 this morning. Wheezing noted throughout. Underwent HD yesterday with plans for repeat HD today.    Objective:     Vital Signs (Most Recent):  Temp: 98.7 °F (37.1 °C) (01/19/24 0814)  Pulse: 99 (01/19/24 0954)  Resp: (!) 32 (01/19/24 1016)  BP: (!) 159/71 (01/19/24 0720)  SpO2: (!) 92 % (01/19/24 0954) Vital Signs (24h Range):  Temp:  [97.6 °F (36.4 °C)-99.6 °F  (37.6 °C)] 98.7 °F (37.1 °C)  Pulse:  [78-99] 99  Resp:  [16-32] 32  SpO2:  [80 %-94 %] 92 %  BP: (141-177)/(63-85) 159/71   Weight: 127.6 kg (281 lb 4.9 oz);  Body mass index is 37.11 kg/m².    Intake/Output Summary (Last 24 hours) at 1/19/2024 1040  Last data filed at 1/19/2024 0830  Gross per 24 hour   Intake 820 ml   Output 1650 ml   Net -830 ml      Physical Exam  Vitals and nursing note reviewed.   HENT:      Head: Normocephalic.   Cardiovascular:      Rate and Rhythm: Tachycardia present.   Pulmonary:      Breath sounds: Wheezing and rhonchi present.   Abdominal:      Palpations: Abdomen is soft.   Musculoskeletal:         General: Normal range of motion.   Skin:     General: Skin is warm and dry.   Neurological:      Mental Status: He is alert. Mental status is at baseline.   Psychiatric:         Mood and Affect: Mood normal.         Review of Systems: Negative except as indicated in HPI    Significant Labs:  CBC/Anemia Profile:  Recent Labs   Lab 01/18/24  0603 01/19/24  0452   WBC 15.41* 14.14*   HGB 7.2* 6.5*   HCT 21.5* 19.7*    364   MCV 88 87   RDW 14.6* 14.6*   Chemistries:  Recent Labs   Lab 01/18/24  0603 01/19/24  0452   * 131*   K 4.5 4.2   CL 96 95   CO2 17* 22*   * 110*   CREATININE 10.6* 8.0*   CALCIUM 6.9* 7.8*   ALBUMIN 1.8* 1.8*   PHOS 8.4* 6.1*

## 2024-01-19 NOTE — PROGRESS NOTES
[facility] ([facility])  Nephrology  Progress Note    Patient Name: Moody Silverio  MRN: [id number]  Admission Date: 1/10/2024  Hospital Length of Stay: 9 days  Attending Provider: Abhijeet Kennedy MD   Primary Care Physician: Jersey Nolan MD  Principal Problem:Acute kidney injury superimposed on CKD  Primary Nephrologist:  Dr. Ramirez of [facility]       Subjective:     Chart reviewed  52 yo male newly diagnosed ESRD s/p first HD treatment yesterday     1/19  - this morning worsening dyspnea, now on BIPAP  - sister at bedside, noted that he had 'hot flashes' and is typically cold, no fever   - high dose IV lasix given with dialysis shifted to urgent     Review of patient's allergies indicates:   Allergen Reactions    Ceftriaxone Itching and Swelling     Current Facility-Administered Medications   Medication Frequency    0.9%  NaCl infusion (for blood administration) Q24H PRN    albuterol-ipratropium 2.5 mg-0.5 mg/3 mL nebulizer solution 3 mL Q6H PRN    amLODIPine tablet 10 mg Daily    atorvastatin tablet 40 mg Nightly    bumetanide injection 2 mg BID    carvediloL tablet 25 mg BID WM    clopidogreL tablet 75 mg Daily    dextrose 10% bolus 125 mL 125 mL PRN    dextrose 10% bolus 125 mL 125 mL PRN    dextrose 10% bolus 250 mL 250 mL PRN    dextrose 10% bolus 250 mL 250 mL PRN    DULoxetine DR capsule 60 mg Daily    enoxaparin injection 30 mg Q24H (prophylaxis, 1700)    epoetin corry injection 7,000 Units Every Tues, Thurs, Sat    fluticasone propionate 50 mcg/actuation nasal spray 100 mcg Daily    gabapentin capsule 300 mg BID    glucagon (human recombinant) injection 1 mg PRN    glucagon (human recombinant) injection 1 mg PRN    glucose chewable tablet 16 g PRN    glucose chewable tablet 16 g PRN    glucose chewable tablet 24 g PRN    glucose chewable tablet 24 g PRN    hydrALAZINE injection 10 mg Q6H PRN    hydrALAZINE tablet 100 mg TID    insulin aspart U-100 pen 0-10 Units QID (AC + HS) PRN    insulin detemir  U-100 (Levemir) pen 14 Units Daily    iron sucrose injection 200 mg Daily    isosorbide mononitrate 24 hr tablet 60 mg Daily    mannitol 25% injection 25 g Once    naloxone 0.4 mg/mL injection 0.02 mg PRN    polyethylene glycol packet 17 g BID PRN    sevelamer carbonate tablet 1,600 mg TID WM    sodium chloride 0.9% bolus 250 mL 250 mL PRN    sodium chloride 0.9% flush 10 mL Q12H PRN    tamsulosin 24 hr capsule 0.4 mg Daily    traZODone tablet 100 mg Nightly PRN       Objective:     Vital Signs (Most Recent):  Temp: 97.8 °F (36.6 °C) (01/19/24 1134)  Pulse: 75 (01/19/24 1330)  Resp: (!) 21 (01/19/24 1245)  BP: 138/63 (01/19/24 1134)  SpO2: 98 % (01/19/24 1245) Vital Signs (24h Range):  Temp:  [97.6 °F (36.4 °C)-99.6 °F (37.6 °C)] 97.8 °F (36.6 °C)  Pulse:  [75-99] 75  Resp:  [16-32] 21  SpO2:  [80 %-98 %] 98 %  BP: (138-177)/(63-85) 138/63     Weight: 127.6 kg (281 lb 4.9 oz) (01/19/24 1153)  Body mass index is 37.11 kg/m².  Body surface area is 2.56 meters squared.    I/O last 3 completed shifts:  In: 700 [P.O.:700]  Out: 1835 [Urine:585; Other:1250]    Physical Exam  Vitals reviewed.   Cardiovascular:      Rate and Rhythm: Normal rate.   Pulmonary:      Effort: Pulmonary effort is normal. No respiratory distress (on BIPAP).      Breath sounds: No wheezing.   Neurological:      Mental Status: He is alert, oriented to person, place, and time and easily aroused.         Significant Labs: labs from admit reviewed   Significant Imaging: CXR today reviewed     Assessment/Plan:     Active Diagnoses:    Diagnosis Date Noted POA    PRINCIPAL PROBLEM:  Acute kidney injury superimposed on CKD [N17.9, N18.9] 01/10/2024 Yes    ESRD (end stage renal disease) [N18.6] 01/17/2024 Unknown    Debility [R53.81] 01/16/2024 Yes    Acute hypoxic respiratory failure [J96.01] 01/12/2024 Yes    Stage 5 chronic kidney disease due to type 2 diabetes mellitus [E11.22, N18.5] 01/12/2024 Yes    Benign prostatic hyperplasia with urinary  obstruction [N40.1, N13.8] 01/10/2024 Yes    CHF (congestive heart failure) [I50.9] 01/10/2024 Yes    Type 2 diabetes mellitus [E11.9] 07/27/2019 Yes    Anemia in chronic kidney disease (CKD) [N18.9, D63.1] 07/27/2019 Yes    Essential hypertension [I10] 07/27/2019 Yes      Problems Resolved During this Admission:     Newly diagnosed ESRD  - s/p HD#1 yesterday, 1.2L UF   - HD#2 today, shifted to urgent as SOB worsened  - check u/a for UTI, can d/c [facility]   - renally dose gabapentin (on 300mg BID, drop to 300mg QHS, monitor mental status)   - d/c IV iron as will receive PRBCs with HD  - d/w  dialysis nurses and sister     Ashvin Tadeo MD  Nephrology  [facility]  I spent a total of 50 minutes on the day of the visit.This includes face to face time and non-face to face time preparing to see the patient (eg, review of tests), obtaining and/or reviewing separately obtained history, documenting clinical information in the electronic or other health record, independently interpreting results and communicating results to the patient/family/caregiver, and/or care coordinator/hospital care team.

## 2024-01-19 NOTE — PLAN OF CARE
Nutrition Recommendations 1/19/24:  1. If pt unable to consume > 25% PO intake x 3 days, recommend re-consult for alternative means of nutrition   2. When medically approrpiate, recommend Diabetic 2000 calorie, Cardiac, Renal, 1500 mL fluid restriction diet, Texture per SLP recommendations   3. Recommend pt continues Novasource renal TID when medically appropriate   4. Recommend bowel regimen (No BM x 9 days)   5. Weight s/p dialysis  6. Collaboration with other providers     Goals:   1. Pt's diet will be modified and to safest diet texture prior to RD follow up   2. If warranted RD consulted by day 3 PO intake 25% or <   3. Pt will tolerate and intake > 75% EEN and EPN by RD follow up   4. Pt will have BM prior to RD follow up    Tamara Roy, Registration Eligible, Provisional LDN

## 2024-01-19 NOTE — ASSESSMENT & PLAN NOTE
Patient with Hypoxic Respiratory failure which is Acute.  he is not on home oxygen. Supplemental oxygen was provided and noted- Oxygen Concentration (%):  [] 100    .   Signs/symptoms of respiratory failure include- tachypnea, increased work of breathing, respiratory distress, and use of accessory muscles. Contributing diagnoses includes - CHF Labs and images were reviewed. Patient Has not had a recent ABG. Will treat underlying causes and adjust management of respiratory failure as follows-       Patient completed Levaquin course   Likely cause of respiratory distress is fluid overload   Dialysis today   Patient placed on BiPAP   Will repeat chest x-ray

## 2024-01-20 NOTE — PLAN OF CARE
01/20/24 0858   Post-Acute Status   Post-Acute Authorization Hospice   Hospice Status Referrals Sent   Discharge Plan   Discharge Plan A Inpatient Hospice   Discharge Plan B Hospice/home     Spoke to patient's daughter regarding inpatient vs home.  Prefers inpt hospice with [facility] per MD recommendation.   Call to [facility] will contact daughter to arrange visit today.    Referral submitted via [facility].

## 2024-01-20 NOTE — PROGRESS NOTES
ST updated progress note:  Pt transferred to ICU on continuous BiPAP with comfort measures in place, per pt wishes.  Pt recommended for pleasure feeds if able and as tolerated.  No further acute ST evaluation/intervention indicated at this time.  ST will sign off.

## 2024-01-20 NOTE — PROGRESS NOTES
[facility] ([facility])  Critical Care Medicine  Progress Note    Patient Name: Moody Silverio  MRN: [id number]  Admission Date: 1/10/2024  Hospital Length of Stay: 10 days  Code Status: DNR  Attending Provider: Jigar Calhoun MD  Primary Care Provider: Jersey Nolan MD   Principal Problem: Acute kidney injury superimposed on CKD    Subjective:     HPI:  Moody Silverio is a 53-year-old male with a known past medical history of chronic kidney disease stage IV, hypertension, BPH, CHF, anemia, prior CVA, blindness, diabetes mellitus, and foot wounds who was admitted by [facility] medicine 1/8/2024 for treatment of volume overload, CHF exacerbation and pneumonia. He initially presented to [facility] in [place], [place] and was treated with BiPAP, IV bumex, antibiotics, and nebulizer treatments. Transfer was requested for Nephrology services who were consulted and have been following since admission. Pulmonary team consulted for evaluation and additional recommendations for ongoing hypoxia requiring vapotherm despite IV diuretics and antibiotics.     Upon exam, Mr. Uriostegui is lying in bed and appears to be breathing comfortably with vapotherm in place. He denies any subjective complaints and reports feeling better. Tmax the past 24 hours 100.8. Sputum culture ordered 1/11 but patient is not producing any for sampling. Patient reports no BM in a few days with soft but distended abdomen. Denies history of known TANA or prior recommendation or or use of CPAP.     Hospital Course/Interval History:   1/16: continues to deny any pulmonary symptoms despite stable, maybe even a little progression of bilateral opacities on CXR. Increased Vapotherm requirements today. Renal has offered to initiate HD at this point, patient was given time to make a decision and tells me that he is ready to start. WBC normal, no fevers, procal down trending   1/19: patient with increased work of breathing today; remains on Vapotherm increased  to 40/1.00 this morning. Wheezing noted throughout. Underwent HD yesterday with plans for repeat HD today.    Hospital/ICU Course:  1/19 - transferred to ICU due to ongoing Bipap dependence despite a couple rounds of dialysis  1/20 - very clear this morning that he just wants to focus on his comfort.  Discussed with his daughter. Transition to full comfort measures this morning.     Interval History: No acute events following transfer to the ICU.    He is alert and conversant.  Oriented and has a clear understanding of his overall clinical situation.  He states that he just wants to focus on his comfort at this time.   I called and talked to his daughter, Peggy, who agrees that this has always been his wish.  He was reluctant to undergo dialysis in the first place, but ultimately gave it a try.  She states he was in favor of inpatient hospice previously and his biggest wish is just to go sit in his recliner.  She agrees that a comfort focus is in accordance with his previously stated wishes.        Objective:     Vital Signs (Most Recent):  Temp: (S) (!) 101.9 °F (38.8 °C) (01/20/24 0703)  Pulse: 104 (01/20/24 0703)  Resp: 18 (01/20/24 0703)  BP: (!) 190/84 (01/20/24 0708)  SpO2: (!) 93 % (01/20/24 0703) Vital Signs (24h Range):  Temp:  [97.8 °F (36.6 °C)-101.9 °F (38.8 °C)] 101.9 °F (38.8 °C)  Pulse:  [] 104  Resp:  [18-32] 18  SpO2:  [80 %-100 %] 93 %  BP: (138-190)/(63-87) 190/84     Weight: 119.2 kg (262 lb 12.6 oz)  Body mass index is 34.67 kg/m².      Intake/Output Summary (Last 24 hours) at 1/20/2024 0740  Last data filed at 1/20/2024 0601  Gross per 24 hour   Intake 1902.5 ml   Output 6661 ml   Net -4758.5 ml        Physical Exam  Vitals and nursing note reviewed.   Constitutional:       General: He is not in acute distress.     Comments: Bipap in place   Cardiovascular:      Rate and Rhythm: Regular rhythm. Tachycardia present.      Pulses: Normal pulses.      Heart sounds: No murmur heard.  Pulmonary:       Breath sounds: No stridor. Wheezing and rales present. No rhonchi.      Comments: tachypnea  Abdominal:      General: Abdomen is flat. There is no distension.      Palpations: Abdomen is soft.      Tenderness: There is no abdominal tenderness.   Musculoskeletal:      Right lower leg: Edema present.      Left lower leg: Edema present.   Neurological:      Mental Status: He is alert and oriented to person, place, and time. Mental status is at baseline.      Comments: Blind (baseline)           Review of Systems    Vents:  Oxygen Concentration (%): 90 (01/20/24 0703)    Lines/Drains/Airways       Central Venous Catheter Line  Duration                  Hemodialysis Catheter 01/18/24 1226 right internal jugular 1 day              Drain  Duration                  Urethral Catheter 01/19/24 2301 Non-latex;Straight-tip;Silicone <1 day              Peripheral Intravenous Line  Duration                  Hemodialysis AV Fistula Right forearm -- days         Peripheral IV - Single Lumen 01/18/24 1828 22 G Posterior;Right Hand 1 day         Peripheral IV - Single Lumen 01/19/24 0500 20 G Anterior;Left Forearm 1 day                    Significant Labs:    CBC/Anemia Profile:  Recent Labs   Lab 01/19/24  0452 01/20/24  0433   WBC 14.14* 18.84*   HGB 6.5* 8.1*   HCT 19.7* 24.2*    381   MCV 87 86   RDW 14.6* 14.6*        Chemistries:  Recent Labs   Lab 01/19/24  0452 01/20/24  0433   * 134*   K 4.2 3.8   CL 95 96   CO2 22* 28   * 80*   CREATININE 8.0* 6.0*   CALCIUM 7.8* 8.0*   ALBUMIN 1.8* 1.7*   PHOS 6.1* 5.4*       All pertinent labs within the past 24 hours have been reviewed.    Significant Imaging:  I have reviewed all pertinent imaging results/findings within the past 24 hours.    ABG  Recent Labs   Lab 01/19/24  1243   PH 7.347*   PO2 154*   PCO2 41.5   HCO3 22.7*   BE -3*     Assessment/Plan:     Cardiac/Vascular  Essential hypertension  - holding home meds given stated GoC    Renal/  * Acute  kidney injury superimposed on CKD  - no further dialysis given GoC    Endocrine  Type 2 diabetes mellitus  - holding further accuchecks given C    Other  Acute hypoxic respiratory failure  Transition off Bipap this morning  Will go to Vapotherm to try and maintain oxygenation to give his family time to attend at bedside  Morphine and versed in place for comfort  DNR; full comfort measures in place       Jigar Calhoun MD  Critical Care Medicine  [facility] ([facility])

## 2024-01-20 NOTE — PROGRESS NOTES
01/19/24 1930   Required for all Hemodialysis Patients   Hepatitis Status other (see comments)   Handoff Report   Received From David Reyes RN   Given To Joy Wallace RN   Treatment Type   Treatment Type Acute   Vital Signs   Temp 98.3 °F (36.8 °C)   Temp Source Axillary   Pulse 79   Heart Rate Source Monitor   Resp 20   SpO2 96 %   Pulse Oximetry Type Continuous   Oximetry Probe Status Intact   Oxygen Concentration (%) 80   Device (Oxygen Therapy) BIPAP   BP (!) 158/86   MAP (mmHg) 113   BP Location Left arm   BP Method Automatic   Patient Position Lying        Hemodialysis Catheter 01/18/24 1226 right internal jugular   Placement Date/Time: 01/18/24 1226   Hand Hygiene: Performed  Barrier Precautions: Performed  Skin Antisepsis: chlorhexidine (without alcohol)  Hemodialysis Catheter Type: Temporary catheter  Location: right internal jugular  Catheter Size (Fr): 13 Fr...   Line Necessity Review CRRT/HD   Verification by X-ray Other (Comment)   Site Assessment No drainage;No redness;No swelling;No warmth   Line Securement Device Secured with sutures   Dressing Type CHG impregnated dressing/sponge;Central line dressing   Dressing Status Clean;Dry;Intact   Dressing Intervention Integrity maintained   Venous Patency/Care heparin locked;deaccessed   Arterial Patency/Care deaccessed;heparin locked   Post-Hemodialysis Assessment   Rinseback Volume (mL) 250 mL   Blood Volume Processed (Liters) 46.2 L   Dialyzer Clearance Lightly streaked   Duration of Treatment 180 minutes   Additional Fluid Intake (mL) 950 mL   Total UF (mL) 4511 mL   Net Fluid Removal 3561   Patient Response to Treatment well tolerated   Post-Hemodialysis Comments 3 hrs of I-HDtx completed as planned, NET UF goal reached without difficulty, tx well tolerated, rec'd x 1 unit of PRBC's with hdtx. vss, NADN, Blood reperfused and pt de accessed according to P&P. RTF withkaden RN as same. remains on Bipap with FIO2 @ 80%, next dialysis tx is planned  for tomorrow AM.

## 2024-01-20 NOTE — PLAN OF CARE
Patient with respiratory distress requiring bipap today. Weaned down to 80% fio2 bipap. Currently being dialyzed. Discussed with nursing staff to attempt weaning down to vapotherm post dialysis. Chest xray worsening. Should patient not tolerate vapotherm and requires continuous bipap, will plan to transfer to ICU. Discussed case with Dr. Calhoun.

## 2024-01-20 NOTE — HOSPITAL COURSE
1/19 - transferred to ICU due to ongoing Bipap dependence despite a couple rounds of dialysis  1/20 - very clear this morning that he just wants to focus on his comfort.  Discussed with his daughter. Transition to full comfort measures this morning.     Bipap was removed and he was placed on Vapotherm.  Family was able to attend him at bedside and he passed peacefully with his family by his side.

## 2024-01-20 NOTE — SIGNIFICANT EVENT
Rapid response called due to desaturating while on Vapotherm 100%/40 L.  He was on BIPAP for most of the day.  Transferred to ICU for continuous BIPAP.  ICU team will assume care.  See pulmonary note from earlier today.

## 2024-01-20 NOTE — DISCHARGE SUMMARY
[facility] ([facility])  Critical Care Medicine  Discharge Summary      Patient Name: Moody Silverio  MRN: [id number]  Admission Date: 1/10/2024  Hospital Length of Stay: 10 days  Discharge Date and Time:  01/20/2024 10:07 AM  Attending Physician: Jigar Calhoun MD   Discharging Provider: Jigar Calhoun MD  Primary Care Provider: Jersey Nolan MD  Reason for Admission: shortness of breath    HPI:   Moody Silverio is a 53-year-old male with a known past medical history of chronic kidney disease stage IV, hypertension, BPH, CHF, anemia, prior CVA, blindness, diabetes mellitus, and foot wounds who was admitted by [facility] medicine 1/8/2024 for treatment of volume overload, CHF exacerbation and pneumonia. He initially presented to [facility] in [place], [place] and was treated with BiPAP, IV bumex, antibiotics, and nebulizer treatments. Transfer was requested for Nephrology services who were consulted and have been following since admission. Pulmonary team consulted for evaluation and additional recommendations for ongoing hypoxia requiring vapotherm despite IV diuretics and antibiotics.     Upon exam, Mr. Uriostegui is lying in bed and appears to be breathing comfortably with vapotherm in place. He denies any subjective complaints and reports feeling better. Tmax the past 24 hours 100.8. Sputum culture ordered 1/11 but patient is not producing any for sampling. Patient reports no BM in a few days with soft but distended abdomen. Denies history of known TANA or prior recommendation or or use of CPAP.     Hospital Course/Interval History:   1/16: continues to deny any pulmonary symptoms despite stable, maybe even a little progression of bilateral opacities on CXR. Increased Vapotherm requirements today. Renal has offered to initiate HD at this point, patient was given time to make a decision and tells me that he is ready to start. WBC normal, no fevers, procal down trending   1/19: patient with increased work of  breathing today; remains on Vapotherm increased to 40/1.00 this morning. Wheezing noted throughout. Underwent HD yesterday with plans for repeat HD today.    * No surgery found *    Indwelling Lines/Drains at Time of Discharge:   Lines/Drains/Airways       Central Venous Catheter Line  Duration                  Hemodialysis Catheter 01/18/24 1226 right internal jugular 1 day              Drain  Duration                  Hemodialysis AV Fistula Right forearm -- days         Urethral Catheter 01/19/24 2301 Non-latex;Straight-tip;Silicone <1 day                  Hospital Course:   1/19 - transferred to ICU due to ongoing Bipap dependence despite a couple rounds of dialysis  1/20 - very clear this morning that he just wants to focus on his comfort.  Discussed with his daughter. Transition to full comfort measures this morning.     Bipap was removed and he was placed on Vapotherm.  Family was able to attend him at bedside and he passed peacefully with his family by his side.    Consults (From admission, onward)          Status Ordering Provider     Inpatient consult to Social Work  Once        Provider:  (Not yet assigned)    Acknowledged ANGELA CARRILLO     Inpatient consult to Interventional Radiology  Once        Provider:  Calvin Bermudez MD    Completed CALVIN BERMUDEZ     Inpatient consult to Social Work  Once        Provider:  (Not yet assigned)    Completed CALVIN BERMUDEZ     Inpatient consult to Social Work  Once        Provider:  (Not yet assigned)    Completed ANGELA CARRILLO     Inpatient consult to Pulmonology  Once        Provider:  (Not yet assigned)    Completed ANGELA CARRILLO     Inpatient consult to Cardiology  Once        Provider:  Dusty Gonsalez MD    Completed JAYLA VIVEROS     Inpatient consult to Nephrology  Once        Provider:  Howie Aldridge MD    Completed JAYLA VIVEROS          Significant Labs:  All pertinent labs within the past 24 hours have been reviewed.    Significant Imaging:  I have reviewed all pertinent  imaging results/findings within the past 24 hours.    Pending Diagnostic Studies:       Procedure Component Value Units Date/Time    Hepatitis B Surface Antibody, Qual/Quant [[id number]] Collected: 24 1609    Order Status: Sent Lab Status: In process Updated: 24 0147    Specimen: Blood           Final Active Diagnoses:    Diagnosis Date Noted POA    PRINCIPAL PROBLEM:  Acute kidney injury superimposed on CKD [N17.9, N18.9] 01/10/2024 Yes    ESRD (end stage renal disease) [N18.6] 2024 Unknown    Debility [R53.81] 2024 Yes    Acute hypoxic respiratory failure [J96.01] 2024 Yes    Stage 5 chronic kidney disease due to type 2 diabetes mellitus [E11.22, N18.5] 2024 Yes    Benign prostatic hyperplasia with urinary obstruction [N40.1, N13.8] 01/10/2024 Yes    CHF (congestive heart failure) [I50.9] 01/10/2024 Yes    Type 2 diabetes mellitus [E11.9] 2019 Yes    Anemia in chronic kidney disease (CKD) [N18.9, D63.1] 2019 Yes    Essential hypertension [I10] 2019 Yes      Problems Resolved During this Admission:     Other  Acute hypoxic respiratory failure  Transition off Bipap this morning  Morphine and versed in place for comfort  DNR; full comfort measures in place    He passed peacefully with family by his side.      Discharged Condition:     Disposition:    Home    Patient Instructions:   No discharge procedures on file.  Medications:  None     Jigar Calhoun MD  Critical Care Medicine  [facility] ([facility])

## 2024-01-20 NOTE — ASSESSMENT & PLAN NOTE
Transition off Bipap this morning  Will go to Vapotherm to try and maintain oxygenation to give his family time to attend at bedside  Morphine and versed in place for comfort  DNR; full comfort measures in place

## 2024-01-20 NOTE — NURSING
Pt made comfort at beginning of shift. Pt made his wishes clear to RN and MD. Pt oriented x4 and aware of clinical situation.

## 2024-01-20 NOTE — SUBJECTIVE & OBJECTIVE
Interval History: No acute events following transfer to the ICU.    He is alert and conversant.  Oriented and has a clear understanding of his overall clinical situation.  He states that he just wants to focus on his comfort at this time.   I called and talked to his daughter, Peggy, who agrees that this has always been his wish.  He was reluctant to undergo dialysis in the first place, but ultimately gave it a try.  She states he was in favor of inpatient hospice previously and his biggest wish is just to go sit in his recliner.  She agrees that a comfort focus is in accordance with his previously stated wishes.        Objective:     Vital Signs (Most Recent):  Temp: (S) (!) 101.9 °F (38.8 °C) (01/20/24 0703)  Pulse: 104 (01/20/24 0703)  Resp: 18 (01/20/24 0703)  BP: (!) 190/84 (01/20/24 0708)  SpO2: (!) 93 % (01/20/24 0703) Vital Signs (24h Range):  Temp:  [97.8 °F (36.6 °C)-101.9 °F (38.8 °C)] 101.9 °F (38.8 °C)  Pulse:  [] 104  Resp:  [18-32] 18  SpO2:  [80 %-100 %] 93 %  BP: (138-190)/(63-87) 190/84     Weight: 119.2 kg (262 lb 12.6 oz)  Body mass index is 34.67 kg/m².      Intake/Output Summary (Last 24 hours) at 1/20/2024 0740  Last data filed at 1/20/2024 0601  Gross per 24 hour   Intake 1902.5 ml   Output 6661 ml   Net -4758.5 ml        Physical Exam  Vitals and nursing note reviewed.   Constitutional:       General: He is not in acute distress.     Comments: Bipap in place   Cardiovascular:      Rate and Rhythm: Regular rhythm. Tachycardia present.      Pulses: Normal pulses.      Heart sounds: No murmur heard.  Pulmonary:      Breath sounds: No stridor. Wheezing and rales present. No rhonchi.      Comments: tachypnea  Abdominal:      General: Abdomen is flat. There is no distension.      Palpations: Abdomen is soft.      Tenderness: There is no abdominal tenderness.   Musculoskeletal:      Right lower leg: Edema present.      Left lower leg: Edema present.   Neurological:      Mental Status: He is  alert and oriented to person, place, and time. Mental status is at baseline.      Comments: Blind (baseline)           Review of Systems    Vents:  Oxygen Concentration (%): 90 (01/20/24 0703)    Lines/Drains/Airways       Central Venous Catheter Line  Duration                  Hemodialysis Catheter 01/18/24 1226 right internal jugular 1 day              Drain  Duration                  Urethral Catheter 01/19/24 2301 Non-latex;Straight-tip;Silicone <1 day              Peripheral Intravenous Line  Duration                  Hemodialysis AV Fistula Right forearm -- days         Peripheral IV - Single Lumen 01/18/24 1828 22 G Posterior;Right Hand 1 day         Peripheral IV - Single Lumen 01/19/24 0500 20 G Anterior;Left Forearm 1 day                    Significant Labs:    CBC/Anemia Profile:  Recent Labs   Lab 01/19/24  0452 01/20/24  0433   WBC 14.14* 18.84*   HGB 6.5* 8.1*   HCT 19.7* 24.2*    381   MCV 87 86   RDW 14.6* 14.6*        Chemistries:  Recent Labs   Lab 01/19/24 0452 01/20/24  0433   * 134*   K 4.2 3.8   CL 95 96   CO2 22* 28   * 80*   CREATININE 8.0* 6.0*   CALCIUM 7.8* 8.0*   ALBUMIN 1.8* 1.7*   PHOS 6.1* 5.4*       All pertinent labs within the past 24 hours have been reviewed.    Significant Imaging:  I have reviewed all pertinent imaging results/findings within the past 24 hours.   Yes

## 2024-01-20 NOTE — ASSESSMENT & PLAN NOTE
Transition off Bipap this morning  Morphine and versed in place for comfort  DNR; full comfort measures in place    He passed peacefully with family by his side.

## 2024-01-20 NOTE — PLAN OF CARE
[facility] - Intensive Care (Hospital)  Discharge Final Note    Primary Care Provider: Jersey Nolan MD    Expected Discharge Date:     Final Discharge Note (most recent)       Final Note - 24 1011          Final Note    Assessment Type Final Discharge Note     Anticipated Discharge Disposition         Post-Acute Status    Discharge Delays None known at this time                     Important Message from Medicare             [facility] notfiied.

## 2024-01-23 NOTE — PHYSICIAN QUERY
PT Name: Moody Silverio  MR #: [id number]     DOCUMENTATION CLARIFICATION     CDS/: Kalyani Carl RN              Contact information: [email or web address]  This form is a permanent document in the medical record.     Query Date: 2024    By submitting this query, we are merely seeking further clarification of documentation.  Please utilize your independent clinical judgment when addressing the question(s) below.  Provider, please provide the integumentary diagnosis related to the documentation of (R Plantar foot ):   The Medical Record contains the followin/11 Wound care consult RN PN:  Right Plantar Ulceration   POA:  YES   Side: Right    Location: Plantar    Primary Wound Type: (c) Ulceration --Partial thickness                The clinical guidelines noted are only a system guideline. It does not replace the providers clinical judgment.    Per the National Pressure Injury Advisory Panel:   A pressure injury is localized damage to the skin and underlying soft tissue usually over a bony prominence or related to a medical or other device. The injury can present as intact skin or an open ulcer and may be painful. The injury occurs as a result of intense and/or prolonged pressure or pressure in combination with shear. The tolerance of soft tissue for pressure and shear may also be affected by microclimate, nutrition, perfusion, co-morbidities and condition of the soft tissue.       Stage 1 Pressure Injury:  Intact skin with a localized area of non-blanchable erythema, which may appear differently in darkly pigmented skin. Color changes do not include purple or maroon discoloration; these may indicate deep tissue pressure injury.    Stage 2 Pressure Injury:  Partial-thickness loss of skin with exposed dermis. The wound bed is viable, pink or red, moist, and may also present as an intact or ruptured serum-filled blister.    Stage 3 Pressure Injury:  Full-thickness loss of skin, in which  adipose (fat) is visible in the ulcer and granulation tissue and epibole (rolled wound edges) are often present. Slough and/or eschar may be visible. Undermining and tunneling may occur.    Stage 4 Pressure Injury:  Full-thickness skin and tissue loss with exposed or directly palpable fascia, muscle, tendon, ligament, cartilage or bone in the ulcer. Slough and/or eschar may be visible. Epibole (rolled edges), undermining and/or tunneling often occur.    Unstageable Pressure Injury:  Full-thickness skin and tissue loss in which the extent of tissue damage within the ulcer cannot be confirmed because it is obscured by slough or eschar. If slough or eschar is removed, a Stage 3 or Stage 4 pressure injury will be revealed.    Deep Tissue Pressure Injury:  Intact or non-intact skin with localized area of persistent non-blanchable deep red, maroon, purple discoloration or epidermal separation revealing a dark wound bed or blood filled blister. This injury results from intense and/or prolonged pressure and shear forces at the bone-muscle interface. The wound may evolve rapidly to reveal the actual extent of tissue injury, or may resolve without tissue loss. If necrotic tissue, subcutaneous tissue, granulation tissue, fascia, muscle or other underlying structures are visible, this indicates a full thickness pressure injury (Unstageable, Stage 3 or Stage 4). Do not use DTPI to describe vascular, traumatic, neuropathic, or dermatologic conditions.   Medical Device Related Pressure Injury: This describes an etiology. Medical device related pressure injuries result from the use of devices designed and applied for diagnostic or therapeutic purposes. The resultant pressure injury generally conforms to the pattern or shape of the device. The injury should be staged using the staging system.    Mucosal Membrane Pressure Injury: Mucosal membrane pressure injury is found on mucous membranes with a history of a medical device in use  at the location of the injury. Due to the anatomy of the tissue these ulcers cannot be staged.       Provider, please provide the integumentary diagnosis related to the documentation of (R Plantar foot ):     [  X ] Pressure Injury/Decubitus Ulcer, Stage 2     [   ] Other Integumentary Diagnosis (please specify):______________         Please document in your progress notes daily for the duration of treatment until resolved and include in your discharge summary.    Reference:    MIRA Fowler., SHEA Lomax., Goldberg, M., MIRA Gottlieb, MIRA Riojas, & SHANNAN Lester. (2016). Revised National Pressure Ulcer Advisory Panel Pressure Injury Staging System: Revised Pressure Injury Staging System. J Wound Ostomy Continence Nurs, 43(6), 585-597. doi:10.1097/won.8807897622684203    Form No.[id number]

## 2024-01-23 NOTE — PHYSICIAN QUERY
PT Name: Moody Silverio  MR #: [id number]     DOCUMENTATION CLARIFICATION     CDS/: Kalyani Carl RN              Contact information: [email or web address]  This form is a permanent document in the medical record.     Query Date: 2024    By submitting this query, we are merely seeking further clarification of documentation.  Please utilize your independent clinical judgment when addressing the question(s) below.  Provider, please provide the integumentary diagnosis related to the documentation of ( Left Plantar foot) :   The Medical Record contains the followin/11 Wound care RN:   Left Plantar Ulceration   POA- : yes    Side: Left    Location: Plantar    Primary Wound Type: (c) Ulceration - Blistered              The clinical guidelines noted are only a system guideline. It does not replace the providers clinical judgment.    Per the National Pressure Injury Advisory Panel:   A pressure injury is localized damage to the skin and underlying soft tissue usually over a bony prominence or related to a medical or other device. The injury can present as intact skin or an open ulcer and may be painful. The injury occurs as a result of intense and/or prolonged pressure or pressure in combination with shear. The tolerance of soft tissue for pressure and shear may also be affected by microclimate, nutrition, perfusion, co-morbidities and condition of the soft tissue.       Stage 1 Pressure Injury:  Intact skin with a localized area of non-blanchable erythema, which may appear differently in darkly pigmented skin. Color changes do not include purple or maroon discoloration; these may indicate deep tissue pressure injury.    Stage 2 Pressure Injury:  Partial-thickness loss of skin with exposed dermis. The wound bed is viable, pink or red, moist, and may also present as an intact or ruptured serum-filled blister.    Stage 3 Pressure Injury:  Full-thickness loss of skin, in which adipose (fat)  is visible in the ulcer and granulation tissue and epibole (rolled wound edges) are often present. Slough and/or eschar may be visible. Undermining and tunneling may occur.    Stage 4 Pressure Injury:  Full-thickness skin and tissue loss with exposed or directly palpable fascia, muscle, tendon, ligament, cartilage or bone in the ulcer. Slough and/or eschar may be visible. Epibole (rolled edges), undermining and/or tunneling often occur.    Unstageable Pressure Injury:  Full-thickness skin and tissue loss in which the extent of tissue damage within the ulcer cannot be confirmed because it is obscured by slough or eschar. If slough or eschar is removed, a Stage 3 or Stage 4 pressure injury will be revealed.    Deep Tissue Pressure Injury:  Intact or non-intact skin with localized area of persistent non-blanchable deep red, maroon, purple discoloration or epidermal separation revealing a dark wound bed or blood filled blister. This injury results from intense and/or prolonged pressure and shear forces at the bone-muscle interface. The wound may evolve rapidly to reveal the actual extent of tissue injury, or may resolve without tissue loss. If necrotic tissue, subcutaneous tissue, granulation tissue, fascia, muscle or other underlying structures are visible, this indicates a full thickness pressure injury (Unstageable, Stage 3 or Stage 4). Do not use DTPI to describe vascular, traumatic, neuropathic, or dermatologic conditions.   Medical Device Related Pressure Injury: This describes an etiology. Medical device related pressure injuries result from the use of devices designed and applied for diagnostic or therapeutic purposes. The resultant pressure injury generally conforms to the pattern or shape of the device. The injury should be staged using the staging system.    Mucosal Membrane Pressure Injury: Mucosal membrane pressure injury is found on mucous membranes with a history of a medical device in use at the location  of the injury. Due to the anatomy of the tissue these ulcers cannot be staged.       Provider, please provide the integumentary diagnosis related to the documentation of (Left Plantar foot ):     [   ] Pressure Injury/Decubitus Ulcer, Stage 2     [ X] Pressure Injury/Decubitus Ulcer, Unstageable     [   ] Other Integumentary Diagnosis (please specify):______________         Please document in your progress notes daily for the duration of treatment until resolved and include in your discharge summary.    Reference:    MIRA Fowler., SHEA Lomax., Goldberg, M., PRISCILLA Gottlieb., MIRA Riojas, & SHANNAN Lester. (2016). Revised National Pressure Ulcer Advisory Panel Pressure Injury Staging System: Revised Pressure Injury Staging System. J Wound Ostomy Continence Nurs, 43(6), 585-597. doi:10.1097/won.3454611898198176    Form No.[id number]

## 2024-01-23 NOTE — PHYSICIAN QUERY
PT Name: Moody Silverio  MR #: [id number]    DOCUMENTATION CLARIFICATION     CDS/: Kalyani Carl RN              Contact information:  [email or web address]    This form is a permanent document in the medical record.     Query Date: January 23, 2024    By submitting this query, we are merely seeking further clarification of documentation.  Please utilize your independent clinical judgment when addressing the question(s) below.    The medical record contains the following:   Indicators  Supporting Clinical Findings Location in Medical Record    Registered Dietician Diagnosis     x Energy Intake Energy Intake (Malnutrition): less than 75% for greater than 7 days     Energy Calories Required: not meeting needs  Protein Required: not meeting needs  Fluid Required: not meeting needs   1/19 Dietitian consult    Weight Loss     x Fat Loss . No NFPE warranted at this time    1/19 Dietitian consult    Muscle Loss     x Edema/Fluid Accumulation Fluid Accumulation (Malnutrition): moderate      Edema: 2+ mild bilateral leg, 3+ moderate bilateral hand/arm.    1/19 Dietitian consult    Reduced  Strength (by dynamometer)      Weight, BMI, Usual Body Weight     x Delayed Wound Healing Skin (Micronutrient): wounds unhealed, edema (Jurgen score = 13 (moderate risk)      Altered skin: bilateral plantar ulceration blistered, clean/dry/intact,   details per Wound care note 1/18; Jurgen score: 13 (moderate risk);      1/19 Dietitian consult    Acute or Chronic Illness      Social or Environmental Circumstances     x Treatment Dietary nutrition supplements- [facility]  Dietitian consult   1/19 Orders     x Other Recommendation/Intervention:   1. If pt unable to consume > 25% PO intake x 3 days, recommend re-consult for alternative means of nutrition   2. When medically approrpiate, recommend Diabetic 2000 calorie, Cardiac, Renal, 1500 mL fluid restriction diet, Texture per SLP recommendations   3. Recommend pt  continues Novasource renal TID when medically appropriate   4. Recommend bowel regimen (No BM x 9 days)   5. Weight s/p dialysis 1/19 Dietitian consult                     Academy of Nutrition and Dietetics (Academy) and the American Society for Parenteral and Enteral Nutrition (A.S.P.E.N.) Clinical Characteristics to support Malnutrition      Criteria for mild malnutrition is defined as 1 characteristic outlined above within the established moderate or severe parameters.  A minimum of 2 out of the 6 characteristics noted above are recommended for a diagnosis of moderate or severe malnutrition.  Chronic illness/injury is a disease/condition lasting 3 months or longer.    The noted clinical guidelines are only system guidelines and do not replace the providers clinical judgment.    Provider, please specify the diagnosis or diagnoses associated with above clinical findings.  Check all that apply.    [  ] Mild Malnutrition - 1 characteristic outlined above within the established moderate or severe parameters     [ X ] Moderate Malnutrition - a minimum of 2 of the 6 moderate malnutrition characteristics noted above      [  ] Other Nutritional Diagnosis (please specify): _______     [  ] Malnutrition ruled out         Please document in your progress notes daily for the duration of treatment until resolved and  include in your discharge summary.      Reference:    SHEA Garcia, PhD, RD, Abimbola IVAN P., PhD, RN, AMBER Arriaga MD, PhD, Lenny LIND A., MS, RD, [facility], KATRINA Bourne, MS, RD, The Academy Malnutrition Work Group, The A.S.P.E.N. Board of Directors. (2012). Consensus Statement: Academy of Nutrition and Dietetics and American Society for Parenteral and Enteral Nutrition: Characteristics Recommended for the Identification and Documentation of Adult Malnutrition (Undernutrition). Journal of Parenteral and Enteral Nutrition, 36(3), 275-283. doi:10.1177/4506747127317248     Form No. [id number]

## 2024-01-24 LAB
HBV SURFACE AB SER QL IA: NEGATIVE
HBV SURFACE AB SERPL IA-ACNC: 4 MIU/ML

## 2024-01-25 LAB
BACTERIA BLD CULT: NORMAL
BACTERIA BLD CULT: NORMAL

## 2025-03-18 NOTE — SUBJECTIVE & OBJECTIVE
Interval History: Pt was seen and examined. Labs and meds reviewed. Discussed with other providers. No new events, pt is more sleepy and seems to have more SOB. Bumex was changed from IV to po yesterday.    Review of patient's allergies indicates:   Allergen Reactions    Ceftriaxone Itching and Swelling     Current Facility-Administered Medications   Medication Frequency    0.9%  NaCl infusion (for blood administration) Q24H PRN    amLODIPine tablet 10 mg Daily    atorvastatin tablet 40 mg Nightly    bumetanide tablet 2 mg Q12H    carvediloL tablet 25 mg BID WM    cloNIDine tablet 0.1 mg BID    clopidogreL tablet 75 mg Daily    dextrose 10% bolus 125 mL 125 mL PRN    dextrose 10% bolus 125 mL 125 mL PRN    dextrose 10% bolus 250 mL 250 mL PRN    dextrose 10% bolus 250 mL 250 mL PRN    DULoxetine DR capsule 60 mg Daily    enoxaparin injection 30 mg Q24H (prophylaxis, 1700)    ferrous sulfate tablet 1 each Daily    fluticasone propionate 50 mcg/actuation nasal spray 100 mcg Daily    gabapentin capsule 300 mg BID    glucagon (human recombinant) injection 1 mg PRN    glucagon (human recombinant) injection 1 mg PRN    glucose chewable tablet 16 g PRN    glucose chewable tablet 16 g PRN    glucose chewable tablet 24 g PRN    glucose chewable tablet 24 g PRN    hydrALAZINE injection 10 mg Q6H PRN    hydrALAZINE tablet 100 mg TID    insulin aspart U-100 pen 0-10 Units QID (AC + HS) PRN    insulin detemir U-100 (Levemir) pen 14 Units Daily    iron sucrose injection 200 mg Daily    isosorbide mononitrate 24 hr tablet 60 mg Daily    levoFLOXacin 750 mg/150 mL IVPB 750 mg Q48H    mupirocin 2 % ointment BID    naloxone 0.4 mg/mL injection 0.02 mg PRN    sodium bicarbonate tablet 650 mg TID    sodium chloride 0.9% flush 10 mL Q12H PRN    tamsulosin 24 hr capsule 0.4 mg Daily    traZODone tablet 100 mg Nightly PRN       Objective:     Vital Signs (Most Recent):  Temp: 98.8 °F (37.1 °C) (01/14/24 1636)  Pulse: 80 (01/14/24  Monitor: The problem is stable.  Evaluation: No labs/tests required today.  Assessment/Treatment:  Continue current treatment/monitoring regimen.   1636)  Resp: 19 (01/14/24 1636)  BP: 137/62 (01/14/24 1636)  SpO2: (!) 93 % (01/14/24 1729) Vital Signs (24h Range):  Temp:  [98.1 °F (36.7 °C)-99.8 °F (37.7 °C)] 98.8 °F (37.1 °C)  Pulse:  [75-88] 80  Resp:  [17-20] 19  SpO2:  [90 %-98 %] 93 %  BP: (128-162)/(58-74) 137/62     Weight: 125.3 kg (276 lb 3.8 oz) (01/13/24 0402)  Body mass index is 36.44 kg/m².  Body surface area is 2.54 meters squared.    I/O last 3 completed shifts:  In: 931.3 [P.O.:220; Blood:711.3]  Out: 3791 [Urine:3791]     Physical Exam  Vitals and nursing note reviewed.   Constitutional:       Appearance: Normal appearance.   Cardiovascular:      Rate and Rhythm: Normal rate and regular rhythm.      Pulses: Normal pulses.      Heart sounds: Normal heart sounds.   Pulmonary:      Effort: Pulmonary effort is normal.      Breath sounds: Rales present.   Abdominal:      Palpations: Abdomen is soft.   Musculoskeletal:      Right lower leg: Edema present.      Left lower leg: Edema present.   Neurological:      Mental Status: He is alert and oriented to person, place, and time.   Psychiatric:         Behavior: Behavior normal.          Significant Labs: reviewed  BMP  Lab Results   Component Value Date     01/14/2024    K 3.6 01/14/2024     01/14/2024    CO2 15 (L) 01/14/2024    BUN 96 (H) 01/14/2024    CREATININE 4.8 (H) 01/14/2024    CALCIUM 7.3 (L) 01/14/2024    ANIONGAP 17 (H) 01/14/2024    EGFRNORACEVR 14 (A) 01/14/2024     Lab Results   Component Value Date    WBC 12.93 (H) 01/14/2024    HGB 8.2 (L) 01/14/2024    HCT 24.6 (L) 01/14/2024    MCV 88 01/14/2024     01/14/2024     Lab Results   Component Value Date    IRON 13 (L) 01/12/2024    TRANSFERRIN 123 (L) 01/12/2024    TIBC 182 (L) 01/12/2024    FESATURATED 7 (L) 01/12/2024        Significant Imaging: CXR and CT scan both with bilateral infiltrates